# Patient Record
Sex: MALE | Race: WHITE | NOT HISPANIC OR LATINO | Employment: OTHER | URBAN - METROPOLITAN AREA
[De-identification: names, ages, dates, MRNs, and addresses within clinical notes are randomized per-mention and may not be internally consistent; named-entity substitution may affect disease eponyms.]

---

## 2017-01-25 ENCOUNTER — AMBULATORY - HEALTHEAST (OUTPATIENT)
Dept: NURSING | Facility: CLINIC | Age: 72
End: 2017-01-25

## 2017-03-07 ENCOUNTER — AMBULATORY - HEALTHEAST (OUTPATIENT)
Dept: NURSING | Facility: CLINIC | Age: 72
End: 2017-03-07

## 2017-04-07 ENCOUNTER — AMBULATORY - HEALTHEAST (OUTPATIENT)
Dept: NURSING | Facility: CLINIC | Age: 72
End: 2017-04-07

## 2017-05-08 ENCOUNTER — AMBULATORY - HEALTHEAST (OUTPATIENT)
Dept: NURSING | Facility: CLINIC | Age: 72
End: 2017-05-08

## 2017-05-31 ENCOUNTER — RECORDS - HEALTHEAST (OUTPATIENT)
Dept: ADMINISTRATIVE | Facility: OTHER | Age: 72
End: 2017-05-31

## 2017-06-01 ENCOUNTER — RECORDS - HEALTHEAST (OUTPATIENT)
Dept: ADMINISTRATIVE | Facility: OTHER | Age: 72
End: 2017-06-01

## 2017-06-07 ENCOUNTER — RECORDS - HEALTHEAST (OUTPATIENT)
Dept: ADMINISTRATIVE | Facility: OTHER | Age: 72
End: 2017-06-07

## 2017-06-08 ENCOUNTER — AMBULATORY - HEALTHEAST (OUTPATIENT)
Dept: NURSING | Facility: CLINIC | Age: 72
End: 2017-06-08

## 2017-06-15 ENCOUNTER — RECORDS - HEALTHEAST (OUTPATIENT)
Dept: ADMINISTRATIVE | Facility: OTHER | Age: 72
End: 2017-06-15

## 2017-07-05 ENCOUNTER — COMMUNICATION - HEALTHEAST (OUTPATIENT)
Dept: INTERNAL MEDICINE | Facility: CLINIC | Age: 72
End: 2017-07-05

## 2017-07-05 DIAGNOSIS — I10 ESSENTIAL HYPERTENSION: ICD-10-CM

## 2017-07-10 ENCOUNTER — AMBULATORY - HEALTHEAST (OUTPATIENT)
Dept: NURSING | Facility: CLINIC | Age: 72
End: 2017-07-10

## 2017-07-19 ENCOUNTER — RECORDS - HEALTHEAST (OUTPATIENT)
Dept: ADMINISTRATIVE | Facility: OTHER | Age: 72
End: 2017-07-19

## 2017-07-27 ENCOUNTER — RECORDS - HEALTHEAST (OUTPATIENT)
Dept: ADMINISTRATIVE | Facility: OTHER | Age: 72
End: 2017-07-27

## 2017-08-10 ENCOUNTER — AMBULATORY - HEALTHEAST (OUTPATIENT)
Dept: NURSING | Facility: CLINIC | Age: 72
End: 2017-08-10

## 2017-08-30 ENCOUNTER — RECORDS - HEALTHEAST (OUTPATIENT)
Dept: ADMINISTRATIVE | Facility: OTHER | Age: 72
End: 2017-08-30

## 2017-09-07 ENCOUNTER — RECORDS - HEALTHEAST (OUTPATIENT)
Dept: ADMINISTRATIVE | Facility: OTHER | Age: 72
End: 2017-09-07

## 2017-09-12 ENCOUNTER — AMBULATORY - HEALTHEAST (OUTPATIENT)
Dept: NURSING | Facility: CLINIC | Age: 72
End: 2017-09-12

## 2017-10-02 ENCOUNTER — COMMUNICATION - HEALTHEAST (OUTPATIENT)
Dept: INTERNAL MEDICINE | Facility: CLINIC | Age: 72
End: 2017-10-02

## 2017-10-05 ENCOUNTER — OFFICE VISIT - HEALTHEAST (OUTPATIENT)
Dept: INTERNAL MEDICINE | Facility: CLINIC | Age: 72
End: 2017-10-05

## 2017-10-05 DIAGNOSIS — M25.512 LEFT SHOULDER PAIN: ICD-10-CM

## 2017-10-05 ASSESSMENT — MIFFLIN-ST. JEOR: SCORE: 1568.51

## 2017-10-10 ENCOUNTER — RECORDS - HEALTHEAST (OUTPATIENT)
Dept: ADMINISTRATIVE | Facility: OTHER | Age: 72
End: 2017-10-10

## 2017-10-11 ENCOUNTER — COMMUNICATION - HEALTHEAST (OUTPATIENT)
Dept: INTERNAL MEDICINE | Facility: CLINIC | Age: 72
End: 2017-10-11

## 2017-10-11 DIAGNOSIS — I10 ESSENTIAL HYPERTENSION: ICD-10-CM

## 2017-10-11 DIAGNOSIS — G60.8 PERIPHERAL SENSORY NEUROPATHY: ICD-10-CM

## 2017-10-12 ENCOUNTER — AMBULATORY - HEALTHEAST (OUTPATIENT)
Dept: NURSING | Facility: CLINIC | Age: 72
End: 2017-10-12

## 2017-11-13 ENCOUNTER — AMBULATORY - HEALTHEAST (OUTPATIENT)
Dept: NURSING | Facility: CLINIC | Age: 72
End: 2017-11-13

## 2017-11-17 ENCOUNTER — COMMUNICATION - HEALTHEAST (OUTPATIENT)
Dept: TELEHEALTH | Facility: CLINIC | Age: 72
End: 2017-11-17

## 2017-11-30 ENCOUNTER — RECORDS - HEALTHEAST (OUTPATIENT)
Dept: ADMINISTRATIVE | Facility: OTHER | Age: 72
End: 2017-11-30

## 2017-12-12 ENCOUNTER — AMBULATORY - HEALTHEAST (OUTPATIENT)
Dept: NURSING | Facility: CLINIC | Age: 72
End: 2017-12-12

## 2017-12-12 ENCOUNTER — AMBULATORY - HEALTHEAST (OUTPATIENT)
Dept: INTERNAL MEDICINE | Facility: CLINIC | Age: 72
End: 2017-12-12

## 2017-12-12 ENCOUNTER — COMMUNICATION - HEALTHEAST (OUTPATIENT)
Dept: INTERNAL MEDICINE | Facility: CLINIC | Age: 72
End: 2017-12-12

## 2017-12-21 ENCOUNTER — RECORDS - HEALTHEAST (OUTPATIENT)
Dept: ADMINISTRATIVE | Facility: OTHER | Age: 72
End: 2017-12-21

## 2018-01-17 ENCOUNTER — RECORDS - HEALTHEAST (OUTPATIENT)
Dept: ADMINISTRATIVE | Facility: OTHER | Age: 73
End: 2018-01-17

## 2018-01-25 ENCOUNTER — AMBULATORY - HEALTHEAST (OUTPATIENT)
Dept: NURSING | Facility: CLINIC | Age: 73
End: 2018-01-25

## 2018-01-25 DIAGNOSIS — E53.8 B12 DEFICIENCY: ICD-10-CM

## 2018-03-13 ENCOUNTER — AMBULATORY - HEALTHEAST (OUTPATIENT)
Dept: NURSING | Facility: CLINIC | Age: 73
End: 2018-03-13

## 2018-03-14 ENCOUNTER — RECORDS - HEALTHEAST (OUTPATIENT)
Dept: ADMINISTRATIVE | Facility: OTHER | Age: 73
End: 2018-03-14

## 2018-04-19 ENCOUNTER — AMBULATORY - HEALTHEAST (OUTPATIENT)
Dept: NURSING | Facility: CLINIC | Age: 73
End: 2018-04-19

## 2018-04-25 ENCOUNTER — OFFICE VISIT - HEALTHEAST (OUTPATIENT)
Dept: INTERNAL MEDICINE | Facility: CLINIC | Age: 73
End: 2018-04-25

## 2018-04-25 DIAGNOSIS — R73.9 HYPERGLYCEMIA: ICD-10-CM

## 2018-04-25 DIAGNOSIS — G60.8 PERIPHERAL SENSORY NEUROPATHY: ICD-10-CM

## 2018-04-25 DIAGNOSIS — Z00.00 PREVENTATIVE HEALTH CARE: ICD-10-CM

## 2018-04-25 DIAGNOSIS — K21.00 GASTROESOPHAGEAL REFLUX DISEASE WITH ESOPHAGITIS: ICD-10-CM

## 2018-04-25 DIAGNOSIS — N13.8 BPH WITH OBSTRUCTION/LOWER URINARY TRACT SYMPTOMS: ICD-10-CM

## 2018-04-25 DIAGNOSIS — R53.83 FATIGUE: ICD-10-CM

## 2018-04-25 DIAGNOSIS — R73.09 ELEVATED HEMOGLOBIN A1C: ICD-10-CM

## 2018-04-25 DIAGNOSIS — I10 ESSENTIAL HYPERTENSION: ICD-10-CM

## 2018-04-25 DIAGNOSIS — N40.1 BPH WITH OBSTRUCTION/LOWER URINARY TRACT SYMPTOMS: ICD-10-CM

## 2018-04-25 DIAGNOSIS — K22.70 BARRETT ESOPHAGUS: ICD-10-CM

## 2018-04-25 LAB
ALBUMIN SERPL-MCNC: 4 G/DL (ref 3.5–5)
ALBUMIN SERPL-MCNC: 4 G/DL (ref 3.5–5)
ALBUMIN UR-MCNC: ABNORMAL MG/DL
ALP SERPL-CCNC: 83 U/L (ref 45–120)
ALT SERPL W P-5'-P-CCNC: 112 U/L (ref 0–45)
ANION GAP SERPL CALCULATED.3IONS-SCNC: 11 MMOL/L (ref 5–18)
APPEARANCE UR: CLEAR
AST SERPL W P-5'-P-CCNC: 64 U/L (ref 0–40)
BACTERIA #/AREA URNS HPF: ABNORMAL HPF
BASOPHILS # BLD AUTO: 0 THOU/UL (ref 0–0.2)
BASOPHILS NFR BLD AUTO: 0 % (ref 0–2)
BILIRUB DIRECT SERPL-MCNC: 0.2 MG/DL
BILIRUB SERPL-MCNC: 0.6 MG/DL (ref 0–1)
BILIRUB UR QL STRIP: ABNORMAL
BUN SERPL-MCNC: 26 MG/DL (ref 8–28)
CALCIUM SERPL-MCNC: 9.9 MG/DL (ref 8.5–10.5)
CHLORIDE BLD-SCNC: 101 MMOL/L (ref 98–107)
CO2 SERPL-SCNC: 25 MMOL/L (ref 22–31)
COLOR UR AUTO: YELLOW
CREAT SERPL-MCNC: 1.5 MG/DL (ref 0.7–1.3)
EOSINOPHIL # BLD AUTO: 0.1 THOU/UL (ref 0–0.4)
EOSINOPHIL NFR BLD AUTO: 2 % (ref 0–6)
ERYTHROCYTE [DISTWIDTH] IN BLOOD BY AUTOMATED COUNT: 12.5 % (ref 11–14.5)
GFR SERPL CREATININE-BSD FRML MDRD: 46 ML/MIN/1.73M2
GLUCOSE BLD-MCNC: 79 MG/DL (ref 70–125)
GLUCOSE UR STRIP-MCNC: NEGATIVE MG/DL
HBA1C MFR BLD: 6 % (ref 3.5–6)
HCT VFR BLD AUTO: 40.6 % (ref 40–54)
HGB BLD-MCNC: 13.7 G/DL (ref 14–18)
HGB UR QL STRIP: NEGATIVE
HYALINE CASTS #/AREA URNS LPF: ABNORMAL LPF
KETONES UR STRIP-MCNC: ABNORMAL MG/DL
LEUKOCYTE ESTERASE UR QL STRIP: NEGATIVE
LYMPHOCYTES # BLD AUTO: 1.4 THOU/UL (ref 0.8–4.4)
LYMPHOCYTES NFR BLD AUTO: 19 % (ref 20–40)
MCH RBC QN AUTO: 32.1 PG (ref 27–34)
MCHC RBC AUTO-ENTMCNC: 33.8 G/DL (ref 32–36)
MCV RBC AUTO: 95 FL (ref 80–100)
MONOCYTES # BLD AUTO: 0.7 THOU/UL (ref 0–0.9)
MONOCYTES NFR BLD AUTO: 10 % (ref 2–10)
MUCOUS THREADS #/AREA URNS LPF: ABNORMAL LPF
NEUTROPHILS # BLD AUTO: 4.9 THOU/UL (ref 2–7.7)
NEUTROPHILS NFR BLD AUTO: 68 % (ref 50–70)
NITRATE UR QL: NEGATIVE
PH UR STRIP: 5.5 [PH] (ref 5–8)
PHOSPHATE SERPL-MCNC: 2.4 MG/DL (ref 2.5–4.5)
PLATELET # BLD AUTO: 179 THOU/UL (ref 140–440)
PMV BLD AUTO: 8.5 FL (ref 7–10)
POTASSIUM BLD-SCNC: 4 MMOL/L (ref 3.5–5)
PROT SERPL-MCNC: 7.6 G/DL (ref 6–8)
RBC # BLD AUTO: 4.27 MILL/UL (ref 4.4–6.2)
RBC #/AREA URNS AUTO: ABNORMAL HPF
SODIUM SERPL-SCNC: 137 MMOL/L (ref 136–145)
SP GR UR STRIP: >=1.03 (ref 1–1.03)
SQUAMOUS #/AREA URNS AUTO: ABNORMAL LPF
UROBILINOGEN UR STRIP-ACNC: ABNORMAL
VIT B12 SERPL-MCNC: 950 PG/ML (ref 213–816)
WBC #/AREA URNS AUTO: ABNORMAL HPF
WBC: 7.2 THOU/UL (ref 4–11)

## 2018-04-25 ASSESSMENT — MIFFLIN-ST. JEOR: SCORE: 1577.4

## 2018-04-26 LAB — HCV AB SERPL QL IA: NEGATIVE

## 2018-04-30 ENCOUNTER — AMBULATORY - HEALTHEAST (OUTPATIENT)
Dept: INTERNAL MEDICINE | Facility: CLINIC | Age: 73
End: 2018-04-30

## 2018-04-30 DIAGNOSIS — R74.8 ABNORMAL LIVER ENZYMES: ICD-10-CM

## 2018-05-23 ENCOUNTER — RECORDS - HEALTHEAST (OUTPATIENT)
Dept: ADMINISTRATIVE | Facility: OTHER | Age: 73
End: 2018-05-23

## 2018-05-24 ENCOUNTER — AMBULATORY - HEALTHEAST (OUTPATIENT)
Dept: NURSING | Facility: CLINIC | Age: 73
End: 2018-05-24

## 2018-05-24 ENCOUNTER — AMBULATORY - HEALTHEAST (OUTPATIENT)
Dept: LAB | Facility: CLINIC | Age: 73
End: 2018-05-24

## 2018-05-24 DIAGNOSIS — R74.8 ABNORMAL LIVER ENZYMES: ICD-10-CM

## 2018-05-24 DIAGNOSIS — E53.8 VITAMIN B12 DEFICIENCY: ICD-10-CM

## 2018-05-24 LAB
ALBUMIN SERPL-MCNC: 4 G/DL (ref 3.5–5)
ALP SERPL-CCNC: 84 U/L (ref 45–120)
ALT SERPL W P-5'-P-CCNC: 61 U/L (ref 0–45)
AST SERPL W P-5'-P-CCNC: 34 U/L (ref 0–40)
BILIRUB DIRECT SERPL-MCNC: 0.2 MG/DL
BILIRUB SERPL-MCNC: 0.5 MG/DL (ref 0–1)
PROT SERPL-MCNC: 6.9 G/DL (ref 6–8)

## 2018-06-04 ENCOUNTER — COMMUNICATION - HEALTHEAST (OUTPATIENT)
Dept: INTERNAL MEDICINE | Facility: CLINIC | Age: 73
End: 2018-06-04

## 2018-06-20 ENCOUNTER — AMBULATORY - HEALTHEAST (OUTPATIENT)
Dept: INTERNAL MEDICINE | Facility: CLINIC | Age: 73
End: 2018-06-20

## 2018-06-21 ENCOUNTER — OFFICE VISIT - HEALTHEAST (OUTPATIENT)
Dept: INTERNAL MEDICINE | Facility: CLINIC | Age: 73
End: 2018-06-21

## 2018-06-21 DIAGNOSIS — G62.9 POLYNEUROPATHY: ICD-10-CM

## 2018-06-21 DIAGNOSIS — I10 ESSENTIAL HYPERTENSION: ICD-10-CM

## 2018-06-21 DIAGNOSIS — Z01.818 PREOPERATIVE EXAMINATION: ICD-10-CM

## 2018-06-21 DIAGNOSIS — H26.9 CATARACT: ICD-10-CM

## 2018-06-21 DIAGNOSIS — N13.8 BPH WITH OBSTRUCTION/LOWER URINARY TRACT SYMPTOMS: ICD-10-CM

## 2018-06-21 DIAGNOSIS — K21.00 GASTROESOPHAGEAL REFLUX DISEASE WITH ESOPHAGITIS: ICD-10-CM

## 2018-06-21 DIAGNOSIS — N40.1 BPH WITH OBSTRUCTION/LOWER URINARY TRACT SYMPTOMS: ICD-10-CM

## 2018-06-21 DIAGNOSIS — D51.0 PERNICIOUS ANEMIA: ICD-10-CM

## 2018-06-21 DIAGNOSIS — N18.30 CKD (CHRONIC KIDNEY DISEASE) STAGE 3, GFR 30-59 ML/MIN (H): ICD-10-CM

## 2018-06-21 LAB
CREAT SERPL-MCNC: 1.49 MG/DL (ref 0.7–1.3)
GFR SERPL CREATININE-BSD FRML MDRD: 46 ML/MIN/1.73M2
POTASSIUM BLD-SCNC: 4.3 MMOL/L (ref 3.5–5)

## 2018-06-21 ASSESSMENT — MIFFLIN-ST. JEOR: SCORE: 1577.22

## 2018-06-28 ENCOUNTER — AMBULATORY - HEALTHEAST (OUTPATIENT)
Dept: NURSING | Facility: CLINIC | Age: 73
End: 2018-06-28

## 2018-06-28 DIAGNOSIS — D51.0 PERNICIOUS ANEMIA: ICD-10-CM

## 2018-07-22 ENCOUNTER — COMMUNICATION - HEALTHEAST (OUTPATIENT)
Dept: INTERNAL MEDICINE | Facility: CLINIC | Age: 73
End: 2018-07-22

## 2018-07-22 DIAGNOSIS — G60.8 PERIPHERAL SENSORY NEUROPATHY: ICD-10-CM

## 2018-07-26 ENCOUNTER — OFFICE VISIT - HEALTHEAST (OUTPATIENT)
Dept: INTERNAL MEDICINE | Facility: CLINIC | Age: 73
End: 2018-07-26

## 2018-07-26 DIAGNOSIS — K21.00 GASTROESOPHAGEAL REFLUX DISEASE WITH ESOPHAGITIS: ICD-10-CM

## 2018-07-26 DIAGNOSIS — I10 ESSENTIAL HYPERTENSION: ICD-10-CM

## 2018-07-26 DIAGNOSIS — N13.8 BPH WITH OBSTRUCTION/LOWER URINARY TRACT SYMPTOMS: ICD-10-CM

## 2018-07-26 DIAGNOSIS — K59.09 CHRONIC CONSTIPATION: ICD-10-CM

## 2018-07-26 DIAGNOSIS — N40.1 BPH WITH OBSTRUCTION/LOWER URINARY TRACT SYMPTOMS: ICD-10-CM

## 2018-07-26 DIAGNOSIS — G62.9 POLYNEUROPATHY: ICD-10-CM

## 2018-07-26 LAB
ALBUMIN SERPL-MCNC: 4.1 G/DL (ref 3.5–5)
ANION GAP SERPL CALCULATED.3IONS-SCNC: 11 MMOL/L (ref 5–18)
BUN SERPL-MCNC: 24 MG/DL (ref 8–28)
CALCIUM SERPL-MCNC: 9.8 MG/DL (ref 8.5–10.5)
CHLORIDE BLD-SCNC: 106 MMOL/L (ref 98–107)
CO2 SERPL-SCNC: 24 MMOL/L (ref 22–31)
CREAT SERPL-MCNC: 1.63 MG/DL (ref 0.7–1.3)
GFR SERPL CREATININE-BSD FRML MDRD: 42 ML/MIN/1.73M2
GLUCOSE BLD-MCNC: 106 MG/DL (ref 70–125)
PHOSPHATE SERPL-MCNC: 2.1 MG/DL (ref 2.5–4.5)
POTASSIUM BLD-SCNC: 4.5 MMOL/L (ref 3.5–5)
SODIUM SERPL-SCNC: 141 MMOL/L (ref 136–145)

## 2018-07-26 ASSESSMENT — MIFFLIN-ST. JEOR: SCORE: 1573.04

## 2018-08-03 ENCOUNTER — RECORDS - HEALTHEAST (OUTPATIENT)
Dept: ADMINISTRATIVE | Facility: OTHER | Age: 73
End: 2018-08-03

## 2018-08-29 ENCOUNTER — COMMUNICATION - HEALTHEAST (OUTPATIENT)
Dept: INTERNAL MEDICINE | Facility: CLINIC | Age: 73
End: 2018-08-29

## 2018-08-29 DIAGNOSIS — I10 ESSENTIAL HYPERTENSION: ICD-10-CM

## 2018-08-30 ENCOUNTER — AMBULATORY - HEALTHEAST (OUTPATIENT)
Dept: NURSING | Facility: CLINIC | Age: 73
End: 2018-08-30

## 2018-08-30 DIAGNOSIS — E53.8 VITAMIN B12 DEFICIENCY (NON ANEMIC): ICD-10-CM

## 2018-09-27 ENCOUNTER — AMBULATORY - HEALTHEAST (OUTPATIENT)
Dept: NURSING | Facility: CLINIC | Age: 73
End: 2018-09-27

## 2018-09-27 DIAGNOSIS — E53.8 VITAMIN B12 DEFICIENCY (NON ANEMIC): ICD-10-CM

## 2018-10-01 ENCOUNTER — COMMUNICATION - HEALTHEAST (OUTPATIENT)
Dept: SCHEDULING | Facility: CLINIC | Age: 73
End: 2018-10-01

## 2018-10-03 ENCOUNTER — AMBULATORY - HEALTHEAST (OUTPATIENT)
Dept: INTERNAL MEDICINE | Facility: CLINIC | Age: 73
End: 2018-10-03

## 2018-10-03 DIAGNOSIS — R10.13 ABDOMINAL PAIN, EPIGASTRIC: ICD-10-CM

## 2018-10-15 ENCOUNTER — RECORDS - HEALTHEAST (OUTPATIENT)
Dept: GENERAL RADIOLOGY | Facility: CLINIC | Age: 73
End: 2018-10-15

## 2018-10-15 ENCOUNTER — OFFICE VISIT - HEALTHEAST (OUTPATIENT)
Dept: INTERNAL MEDICINE | Facility: CLINIC | Age: 73
End: 2018-10-15

## 2018-10-15 DIAGNOSIS — R74.8 ABNORMAL LIVER ENZYMES: ICD-10-CM

## 2018-10-15 DIAGNOSIS — N18.30 CKD (CHRONIC KIDNEY DISEASE) STAGE 3, GFR 30-59 ML/MIN (H): ICD-10-CM

## 2018-10-15 DIAGNOSIS — I10 ESSENTIAL HYPERTENSION: ICD-10-CM

## 2018-10-15 DIAGNOSIS — R10.9 RIGHT FLANK PAIN: ICD-10-CM

## 2018-10-15 DIAGNOSIS — K21.00 GASTROESOPHAGEAL REFLUX DISEASE WITH ESOPHAGITIS: ICD-10-CM

## 2018-10-15 DIAGNOSIS — M47.817 LUMBAR AND SACRAL ARTHRITIS: ICD-10-CM

## 2018-10-15 DIAGNOSIS — G62.9 POLYNEUROPATHY: ICD-10-CM

## 2018-10-15 DIAGNOSIS — R10.9 UNSPECIFIED ABDOMINAL PAIN: ICD-10-CM

## 2018-10-15 LAB
ALBUMIN SERPL-MCNC: 4 G/DL (ref 3.5–5)
ALBUMIN UR-MCNC: ABNORMAL MG/DL
ALP SERPL-CCNC: 89 U/L (ref 45–120)
ALT SERPL W P-5'-P-CCNC: 55 U/L (ref 0–45)
ANION GAP SERPL CALCULATED.3IONS-SCNC: 13 MMOL/L (ref 5–18)
APPEARANCE UR: CLEAR
AST SERPL W P-5'-P-CCNC: 30 U/L (ref 0–40)
BACTERIA #/AREA URNS HPF: ABNORMAL HPF
BASOPHILS # BLD AUTO: 0.1 THOU/UL (ref 0–0.2)
BASOPHILS NFR BLD AUTO: 1 % (ref 0–2)
BILIRUB DIRECT SERPL-MCNC: 0.2 MG/DL
BILIRUB SERPL-MCNC: 0.5 MG/DL (ref 0–1)
BILIRUB UR QL STRIP: NEGATIVE
BUN SERPL-MCNC: 22 MG/DL (ref 8–28)
CALCIUM SERPL-MCNC: 10.3 MG/DL (ref 8.5–10.5)
CHLORIDE BLD-SCNC: 101 MMOL/L (ref 98–107)
CO2 SERPL-SCNC: 25 MMOL/L (ref 22–31)
COLOR UR AUTO: YELLOW
CREAT SERPL-MCNC: 1.42 MG/DL (ref 0.7–1.3)
EOSINOPHIL # BLD AUTO: 0.3 THOU/UL (ref 0–0.4)
EOSINOPHIL NFR BLD AUTO: 3 % (ref 0–6)
ERYTHROCYTE [DISTWIDTH] IN BLOOD BY AUTOMATED COUNT: 13 % (ref 11–14.5)
ERYTHROCYTE [SEDIMENTATION RATE] IN BLOOD BY WESTERGREN METHOD: 13 MM/HR (ref 0–15)
GFR SERPL CREATININE-BSD FRML MDRD: 49 ML/MIN/1.73M2
GLUCOSE BLD-MCNC: 102 MG/DL (ref 70–125)
GLUCOSE UR STRIP-MCNC: NEGATIVE MG/DL
HCT VFR BLD AUTO: 41.3 % (ref 40–54)
HGB BLD-MCNC: 13.9 G/DL (ref 14–18)
HGB UR QL STRIP: NEGATIVE
KETONES UR STRIP-MCNC: NEGATIVE MG/DL
LEUKOCYTE ESTERASE UR QL STRIP: NEGATIVE
LYMPHOCYTES # BLD AUTO: 1.2 THOU/UL (ref 0.8–4.4)
LYMPHOCYTES NFR BLD AUTO: 13 % (ref 20–40)
MCH RBC QN AUTO: 31.8 PG (ref 27–34)
MCHC RBC AUTO-ENTMCNC: 33.7 G/DL (ref 32–36)
MCV RBC AUTO: 95 FL (ref 80–100)
MONOCYTES # BLD AUTO: 0.7 THOU/UL (ref 0–0.9)
MONOCYTES NFR BLD AUTO: 8 % (ref 2–10)
NEUTROPHILS # BLD AUTO: 6.6 THOU/UL (ref 2–7.7)
NEUTROPHILS NFR BLD AUTO: 75 % (ref 50–70)
NITRATE UR QL: NEGATIVE
PH UR STRIP: 6.5 [PH] (ref 5–8)
PLATELET # BLD AUTO: 238 THOU/UL (ref 140–440)
PMV BLD AUTO: 11.3 FL (ref 8.5–12.5)
POTASSIUM BLD-SCNC: 4.1 MMOL/L (ref 3.5–5)
PROT SERPL-MCNC: 7.4 G/DL (ref 6–8)
RBC # BLD AUTO: 4.37 MILL/UL (ref 4.4–6.2)
RBC #/AREA URNS AUTO: ABNORMAL HPF
SODIUM SERPL-SCNC: 139 MMOL/L (ref 136–145)
SP GR UR STRIP: 1.01 (ref 1–1.03)
SQUAMOUS #/AREA URNS AUTO: ABNORMAL LPF
UROBILINOGEN UR STRIP-ACNC: ABNORMAL
WBC #/AREA URNS AUTO: ABNORMAL HPF
WBC: 8.9 THOU/UL (ref 4–11)

## 2018-10-15 ASSESSMENT — MIFFLIN-ST. JEOR: SCORE: 1595.54

## 2018-10-17 LAB
ALBUMIN PERCENT: 62.3 % (ref 51–67)
ALBUMIN SERPL ELPH-MCNC: 4.4 G/DL (ref 3.2–4.7)
ALPHA 1 PERCENT: 2.5 % (ref 2–4)
ALPHA 2 PERCENT: 10.9 % (ref 5–13)
ALPHA1 GLOB SERPL ELPH-MCNC: 0.2 G/DL (ref 0.1–0.3)
ALPHA2 GLOB SERPL ELPH-MCNC: 0.8 G/DL (ref 0.4–0.9)
B-GLOBULIN SERPL ELPH-MCNC: 1 G/DL (ref 0.7–1.2)
BETA PERCENT: 14 % (ref 10–17)
GAMMA GLOB SERPL ELPH-MCNC: 0.7 G/DL (ref 0.6–1.4)
GAMMA GLOBULIN PERCENT: 10.3 % (ref 9–20)
PATH ICD:: NORMAL
PROT PATTERN SERPL ELPH-IMP: NORMAL
PROT SERPL-MCNC: 7 G/DL (ref 6–8)
REVIEWING PATHOLOGIST: NORMAL

## 2018-10-19 ENCOUNTER — HOSPITAL ENCOUNTER (OUTPATIENT)
Dept: CT IMAGING | Facility: HOSPITAL | Age: 73
Discharge: HOME | End: 2018-10-19
Attending: INTERNAL MEDICINE

## 2018-10-19 DIAGNOSIS — R10.9 RIGHT FLANK PAIN: ICD-10-CM

## 2018-10-24 ENCOUNTER — RECORDS - HEALTHEAST (OUTPATIENT)
Dept: ADMINISTRATIVE | Facility: OTHER | Age: 73
End: 2018-10-24

## 2018-10-25 ENCOUNTER — RECORDS - HEALTHEAST (OUTPATIENT)
Dept: ADMINISTRATIVE | Facility: OTHER | Age: 73
End: 2018-10-25

## 2018-10-25 ENCOUNTER — AMBULATORY - HEALTHEAST (OUTPATIENT)
Dept: NURSING | Facility: CLINIC | Age: 73
End: 2018-10-25

## 2018-10-31 ENCOUNTER — RECORDS - HEALTHEAST (OUTPATIENT)
Dept: ADMINISTRATIVE | Facility: OTHER | Age: 73
End: 2018-10-31

## 2018-11-16 ENCOUNTER — RECORDS - HEALTHEAST (OUTPATIENT)
Dept: ADMINISTRATIVE | Facility: OTHER | Age: 73
End: 2018-11-16

## 2018-11-29 ENCOUNTER — AMBULATORY - HEALTHEAST (OUTPATIENT)
Dept: NURSING | Facility: CLINIC | Age: 73
End: 2018-11-29

## 2018-12-06 ENCOUNTER — RECORDS - HEALTHEAST (OUTPATIENT)
Dept: ADMINISTRATIVE | Facility: OTHER | Age: 73
End: 2018-12-06

## 2018-12-11 ENCOUNTER — RECORDS - HEALTHEAST (OUTPATIENT)
Dept: LAB | Facility: HOSPITAL | Age: 73
End: 2018-12-11

## 2018-12-11 LAB
FASTING STATUS PATIENT QL REPORTED: YES
GLUCOSE BLD-MCNC: 105 MG/DL (ref 70–125)
TSH SERPL DL<=0.005 MIU/L-ACNC: 1.79 UIU/ML (ref 0.3–5)

## 2018-12-12 ENCOUNTER — AMBULATORY - HEALTHEAST (OUTPATIENT)
Dept: INTERNAL MEDICINE | Facility: CLINIC | Age: 73
End: 2018-12-12

## 2018-12-13 ENCOUNTER — RECORDS - HEALTHEAST (OUTPATIENT)
Dept: ADMINISTRATIVE | Facility: OTHER | Age: 73
End: 2018-12-13

## 2018-12-13 LAB — LYME TOTAL ANTIBODY - HISTORICAL: 0.05 INDEX VALUE

## 2018-12-14 ENCOUNTER — HOSPITAL ENCOUNTER (OUTPATIENT)
Dept: LAB | Age: 73
Setting detail: SPECIMEN
Discharge: HOME | End: 2018-12-14

## 2018-12-14 ENCOUNTER — OFFICE VISIT - HEALTHEAST (OUTPATIENT)
Dept: INTERNAL MEDICINE | Facility: CLINIC | Age: 73
End: 2018-12-14

## 2018-12-14 DIAGNOSIS — K86.9 PANCREATIC LESION: ICD-10-CM

## 2018-12-14 DIAGNOSIS — N18.30 CKD (CHRONIC KIDNEY DISEASE) STAGE 3, GFR 30-59 ML/MIN (H): ICD-10-CM

## 2018-12-14 DIAGNOSIS — G62.9 POLYNEUROPATHY: ICD-10-CM

## 2018-12-14 DIAGNOSIS — I10 ESSENTIAL HYPERTENSION: ICD-10-CM

## 2018-12-14 LAB
ATRIAL RATE - MUSE: 65 BPM
DIASTOLIC BLOOD PRESSURE - MUSE: NORMAL MMHG
INTERPRETATION ECG - MUSE: NORMAL
P AXIS - MUSE: NORMAL DEGREES
POTASSIUM BLD-SCNC: 3.5 MMOL/L (ref 3.5–5)
PR INTERVAL - MUSE: NORMAL MS
QRS DURATION - MUSE: 88 MS
QT - MUSE: 430 MS
QTC - MUSE: 447 MS
R AXIS - MUSE: -4 DEGREES
SYSTOLIC BLOOD PRESSURE - MUSE: NORMAL MMHG
T AXIS - MUSE: 20 DEGREES
VENTRICULAR RATE- MUSE: 65 BPM

## 2018-12-14 ASSESSMENT — MIFFLIN-ST. JEOR: SCORE: 1555.67

## 2018-12-18 ENCOUNTER — RECORDS - HEALTHEAST (OUTPATIENT)
Dept: ADMINISTRATIVE | Facility: OTHER | Age: 73
End: 2018-12-18

## 2018-12-20 ENCOUNTER — ANESTHESIA - HEALTHEAST (OUTPATIENT)
Dept: SURGERY | Facility: HOSPITAL | Age: 73
End: 2018-12-20

## 2018-12-21 ENCOUNTER — SURGERY - HEALTHEAST (OUTPATIENT)
Dept: SURGERY | Facility: HOSPITAL | Age: 73
End: 2018-12-21

## 2018-12-27 ENCOUNTER — RECORDS - HEALTHEAST (OUTPATIENT)
Dept: ADMINISTRATIVE | Facility: OTHER | Age: 73
End: 2018-12-27

## 2018-12-28 ENCOUNTER — AMBULATORY - HEALTHEAST (OUTPATIENT)
Dept: NURSING | Facility: CLINIC | Age: 73
End: 2018-12-28

## 2018-12-28 DIAGNOSIS — E53.8 VITAMIN B12 DEFICIENCY: ICD-10-CM

## 2019-01-14 ENCOUNTER — RECORDS - HEALTHEAST (OUTPATIENT)
Dept: ADMINISTRATIVE | Facility: OTHER | Age: 74
End: 2019-01-14

## 2019-01-17 ENCOUNTER — RECORDS - HEALTHEAST (OUTPATIENT)
Dept: ADMINISTRATIVE | Facility: OTHER | Age: 74
End: 2019-01-17

## 2019-01-22 ENCOUNTER — RECORDS - HEALTHEAST (OUTPATIENT)
Dept: ADMINISTRATIVE | Facility: OTHER | Age: 74
End: 2019-01-22

## 2019-01-24 ENCOUNTER — OFFICE VISIT - HEALTHEAST (OUTPATIENT)
Dept: INTERNAL MEDICINE | Facility: CLINIC | Age: 74
End: 2019-01-24

## 2019-01-24 DIAGNOSIS — I10 ESSENTIAL HYPERTENSION: ICD-10-CM

## 2019-01-24 DIAGNOSIS — G62.9 POLYNEUROPATHY: ICD-10-CM

## 2019-01-24 DIAGNOSIS — K22.70 BARRETT'S ESOPHAGUS WITHOUT DYSPLASIA: ICD-10-CM

## 2019-01-24 DIAGNOSIS — K86.2 PANCREATIC CYST: ICD-10-CM

## 2019-01-24 ASSESSMENT — MIFFLIN-ST. JEOR: SCORE: 1555.67

## 2019-03-07 ENCOUNTER — AMBULATORY - HEALTHEAST (OUTPATIENT)
Dept: NURSING | Facility: CLINIC | Age: 74
End: 2019-03-07

## 2019-03-07 DIAGNOSIS — E53.8 VITAMIN B 12 DEFICIENCY: ICD-10-CM

## 2019-04-02 ENCOUNTER — AMBULATORY - HEALTHEAST (OUTPATIENT)
Dept: NURSING | Facility: CLINIC | Age: 74
End: 2019-04-02

## 2019-04-02 DIAGNOSIS — E53.9 VITAMIN B DEFICIENCY: ICD-10-CM

## 2019-04-30 ENCOUNTER — AMBULATORY - HEALTHEAST (OUTPATIENT)
Dept: NURSING | Facility: CLINIC | Age: 74
End: 2019-04-30

## 2019-05-02 ENCOUNTER — COMMUNICATION - HEALTHEAST (OUTPATIENT)
Dept: INTERNAL MEDICINE | Facility: CLINIC | Age: 74
End: 2019-05-02

## 2019-05-07 ENCOUNTER — COMMUNICATION - HEALTHEAST (OUTPATIENT)
Dept: INTERNAL MEDICINE | Facility: CLINIC | Age: 74
End: 2019-05-07

## 2019-05-09 ENCOUNTER — RECORDS - HEALTHEAST (OUTPATIENT)
Dept: ADMINISTRATIVE | Facility: OTHER | Age: 74
End: 2019-05-09

## 2019-05-13 ENCOUNTER — RECORDS - HEALTHEAST (OUTPATIENT)
Dept: ADMINISTRATIVE | Facility: OTHER | Age: 74
End: 2019-05-13

## 2019-05-29 ENCOUNTER — RECORDS - HEALTHEAST (OUTPATIENT)
Dept: ADMINISTRATIVE | Facility: OTHER | Age: 74
End: 2019-05-29

## 2019-05-30 ENCOUNTER — AMBULATORY - HEALTHEAST (OUTPATIENT)
Dept: NURSING | Facility: CLINIC | Age: 74
End: 2019-05-30

## 2019-06-12 ENCOUNTER — RECORDS - HEALTHEAST (OUTPATIENT)
Dept: ADMINISTRATIVE | Facility: OTHER | Age: 74
End: 2019-06-12

## 2019-06-25 ENCOUNTER — AMBULATORY - HEALTHEAST (OUTPATIENT)
Dept: NURSING | Facility: CLINIC | Age: 74
End: 2019-06-25

## 2019-07-23 ENCOUNTER — RECORDS - HEALTHEAST (OUTPATIENT)
Dept: ADMINISTRATIVE | Facility: OTHER | Age: 74
End: 2019-07-23

## 2019-07-24 ENCOUNTER — OFFICE VISIT - HEALTHEAST (OUTPATIENT)
Dept: INTERNAL MEDICINE | Facility: CLINIC | Age: 74
End: 2019-07-24

## 2019-07-24 DIAGNOSIS — K22.70 BARRETT'S ESOPHAGUS WITHOUT DYSPLASIA: ICD-10-CM

## 2019-07-24 DIAGNOSIS — K31.9 GASTROPATHY: ICD-10-CM

## 2019-07-24 DIAGNOSIS — N18.30 CKD (CHRONIC KIDNEY DISEASE) STAGE 3, GFR 30-59 ML/MIN (H): ICD-10-CM

## 2019-07-24 DIAGNOSIS — R10.13 DYSPEPSIA: ICD-10-CM

## 2019-07-24 DIAGNOSIS — G62.9 POLYNEUROPATHY: ICD-10-CM

## 2019-07-24 DIAGNOSIS — I10 ESSENTIAL HYPERTENSION: ICD-10-CM

## 2019-07-24 DIAGNOSIS — N13.8 BPH WITH OBSTRUCTION/LOWER URINARY TRACT SYMPTOMS: ICD-10-CM

## 2019-07-24 DIAGNOSIS — N40.1 BPH WITH OBSTRUCTION/LOWER URINARY TRACT SYMPTOMS: ICD-10-CM

## 2019-07-24 LAB
ANION GAP SERPL CALCULATED.3IONS-SCNC: 10 MMOL/L (ref 5–18)
BASOPHILS # BLD AUTO: 0 THOU/UL (ref 0–0.2)
BASOPHILS NFR BLD AUTO: 1 % (ref 0–2)
BUN SERPL-MCNC: 23 MG/DL (ref 8–28)
CALCIUM SERPL-MCNC: 10.1 MG/DL (ref 8.5–10.5)
CHLORIDE BLD-SCNC: 102 MMOL/L (ref 98–107)
CO2 SERPL-SCNC: 26 MMOL/L (ref 22–31)
CREAT SERPL-MCNC: 1.33 MG/DL (ref 0.7–1.3)
EOSINOPHIL # BLD AUTO: 0.3 THOU/UL (ref 0–0.4)
EOSINOPHIL NFR BLD AUTO: 4 % (ref 0–6)
ERYTHROCYTE [DISTWIDTH] IN BLOOD BY AUTOMATED COUNT: 13.1 % (ref 11–14.5)
GFR SERPL CREATININE-BSD FRML MDRD: 52 ML/MIN/1.73M2
GLUCOSE BLD-MCNC: 86 MG/DL (ref 70–125)
HCT VFR BLD AUTO: 40.7 % (ref 40–54)
HGB BLD-MCNC: 13.9 G/DL (ref 14–18)
LYMPHOCYTES # BLD AUTO: 1.8 THOU/UL (ref 0.8–4.4)
LYMPHOCYTES NFR BLD AUTO: 20 % (ref 20–40)
MCH RBC QN AUTO: 31.8 PG (ref 27–34)
MCHC RBC AUTO-ENTMCNC: 34.1 G/DL (ref 32–36)
MCV RBC AUTO: 93 FL (ref 80–100)
MONOCYTES # BLD AUTO: 0.8 THOU/UL (ref 0–0.9)
MONOCYTES NFR BLD AUTO: 10 % (ref 2–10)
NEUTROPHILS # BLD AUTO: 5.6 THOU/UL (ref 2–7.7)
NEUTROPHILS NFR BLD AUTO: 65 % (ref 50–70)
PLATELET # BLD AUTO: 203 THOU/UL (ref 140–440)
PMV BLD AUTO: 8.3 FL (ref 7–10)
POTASSIUM BLD-SCNC: 4.2 MMOL/L (ref 3.5–5)
RBC # BLD AUTO: 4.36 MILL/UL (ref 4.4–6.2)
SODIUM SERPL-SCNC: 138 MMOL/L (ref 136–145)
WBC: 8.6 THOU/UL (ref 4–11)

## 2019-07-24 ASSESSMENT — MIFFLIN-ST. JEOR: SCORE: 1569.82

## 2019-08-08 ENCOUNTER — RECORDS - HEALTHEAST (OUTPATIENT)
Dept: ADMINISTRATIVE | Facility: OTHER | Age: 74
End: 2019-08-08

## 2019-08-13 ENCOUNTER — RECORDS - HEALTHEAST (OUTPATIENT)
Dept: ADMINISTRATIVE | Facility: OTHER | Age: 74
End: 2019-08-13

## 2019-08-22 ENCOUNTER — AMBULATORY - HEALTHEAST (OUTPATIENT)
Dept: NURSING | Facility: CLINIC | Age: 74
End: 2019-08-22

## 2019-09-19 ENCOUNTER — AMBULATORY - HEALTHEAST (OUTPATIENT)
Dept: NURSING | Facility: CLINIC | Age: 74
End: 2019-09-19

## 2019-09-25 ENCOUNTER — RECORDS - HEALTHEAST (OUTPATIENT)
Dept: ADMINISTRATIVE | Facility: OTHER | Age: 74
End: 2019-09-25

## 2019-10-03 ENCOUNTER — COMMUNICATION - HEALTHEAST (OUTPATIENT)
Dept: INTERNAL MEDICINE | Facility: CLINIC | Age: 74
End: 2019-10-03

## 2019-10-03 DIAGNOSIS — I10 ESSENTIAL HYPERTENSION: ICD-10-CM

## 2019-10-07 ENCOUNTER — COMMUNICATION - HEALTHEAST (OUTPATIENT)
Dept: INTERNAL MEDICINE | Facility: CLINIC | Age: 74
End: 2019-10-07

## 2019-10-08 ENCOUNTER — AMBULATORY - HEALTHEAST (OUTPATIENT)
Dept: INTERNAL MEDICINE | Facility: CLINIC | Age: 74
End: 2019-10-08

## 2019-10-08 DIAGNOSIS — I10 ESSENTIAL HYPERTENSION: ICD-10-CM

## 2019-10-15 ENCOUNTER — RECORDS - HEALTHEAST (OUTPATIENT)
Dept: ADMINISTRATIVE | Facility: OTHER | Age: 74
End: 2019-10-15

## 2019-10-17 ENCOUNTER — AMBULATORY - HEALTHEAST (OUTPATIENT)
Dept: NURSING | Facility: CLINIC | Age: 74
End: 2019-10-17

## 2019-10-18 ENCOUNTER — OFFICE VISIT - HEALTHEAST (OUTPATIENT)
Dept: INTERNAL MEDICINE | Facility: CLINIC | Age: 74
End: 2019-10-18

## 2019-10-18 DIAGNOSIS — I10 ESSENTIAL HYPERTENSION: ICD-10-CM

## 2019-10-18 DIAGNOSIS — K21.00 GASTROESOPHAGEAL REFLUX DISEASE WITH ESOPHAGITIS: ICD-10-CM

## 2019-10-18 DIAGNOSIS — K86.2 PANCREATIC CYST: ICD-10-CM

## 2019-10-18 DIAGNOSIS — G62.9 NEUROPATHY: ICD-10-CM

## 2019-10-18 DIAGNOSIS — K22.70 BARRETT'S ESOPHAGUS WITHOUT DYSPLASIA: ICD-10-CM

## 2019-10-18 DIAGNOSIS — K30 FUNCTIONAL DYSPEPSIA: ICD-10-CM

## 2019-10-18 DIAGNOSIS — F45.8 OTHER SOMATOFORM DISORDERS: ICD-10-CM

## 2019-10-18 LAB
ALBUMIN SERPL-MCNC: 4.3 G/DL (ref 3.5–5)
ANION GAP SERPL CALCULATED.3IONS-SCNC: 11 MMOL/L (ref 5–18)
ATRIAL RATE - MUSE: 63 BPM
BASOPHILS # BLD AUTO: 0.1 THOU/UL (ref 0–0.2)
BASOPHILS NFR BLD AUTO: 1 % (ref 0–2)
BUN SERPL-MCNC: 16 MG/DL (ref 8–28)
CALCIUM SERPL-MCNC: 9.8 MG/DL (ref 8.5–10.5)
CHLORIDE BLD-SCNC: 96 MMOL/L (ref 98–107)
CO2 SERPL-SCNC: 25 MMOL/L (ref 22–31)
CREAT SERPL-MCNC: 1.36 MG/DL (ref 0.7–1.3)
DIASTOLIC BLOOD PRESSURE - MUSE: NORMAL
EOSINOPHIL # BLD AUTO: 0.2 THOU/UL (ref 0–0.4)
EOSINOPHIL NFR BLD AUTO: 2 % (ref 0–6)
ERYTHROCYTE [DISTWIDTH] IN BLOOD BY AUTOMATED COUNT: 12.4 % (ref 11–14.5)
FERRITIN SERPL-MCNC: 72 NG/ML (ref 27–300)
GFR SERPL CREATININE-BSD FRML MDRD: 51 ML/MIN/1.73M2
GLUCOSE BLD-MCNC: 94 MG/DL (ref 70–125)
HCT VFR BLD AUTO: 39.1 % (ref 40–54)
HGB BLD-MCNC: 13.2 G/DL (ref 14–18)
INTERPRETATION ECG - MUSE: NORMAL
IRON SATN MFR SERPL: 30 % (ref 20–50)
IRON SERPL-MCNC: 94 UG/DL (ref 42–175)
LYMPHOCYTES # BLD AUTO: 1.9 THOU/UL (ref 0.8–4.4)
LYMPHOCYTES NFR BLD AUTO: 22 % (ref 20–40)
MCH RBC QN AUTO: 31.7 PG (ref 27–34)
MCHC RBC AUTO-ENTMCNC: 33.9 G/DL (ref 32–36)
MCV RBC AUTO: 94 FL (ref 80–100)
MONOCYTES # BLD AUTO: 0.8 THOU/UL (ref 0–0.9)
MONOCYTES NFR BLD AUTO: 9 % (ref 2–10)
NEUTROPHILS # BLD AUTO: 5.9 THOU/UL (ref 2–7.7)
NEUTROPHILS NFR BLD AUTO: 67 % (ref 50–70)
P AXIS - MUSE: 53 DEGREES
PHOSPHATE SERPL-MCNC: 2.1 MG/DL (ref 2.5–4.5)
PLATELET # BLD AUTO: 201 THOU/UL (ref 140–440)
PMV BLD AUTO: 8.5 FL (ref 7–10)
POTASSIUM BLD-SCNC: 4 MMOL/L (ref 3.5–5)
PR INTERVAL - MUSE: 210 MS
QRS DURATION - MUSE: 88 MS
QT - MUSE: 424 MS
QTC - MUSE: 433 MS
R AXIS - MUSE: -10 DEGREES
RBC # BLD AUTO: 4.17 MILL/UL (ref 4.4–6.2)
SODIUM SERPL-SCNC: 132 MMOL/L (ref 136–145)
SYSTOLIC BLOOD PRESSURE - MUSE: NORMAL
T AXIS - MUSE: 30 DEGREES
TIBC SERPL-MCNC: 318 UG/DL (ref 313–563)
TRANSFERRIN SERPL-MCNC: 255 MG/DL (ref 212–360)
VENTRICULAR RATE- MUSE: 63 BPM
WBC: 8.8 THOU/UL (ref 4–11)

## 2019-10-18 ASSESSMENT — MIFFLIN-ST. JEOR: SCORE: 1546.6

## 2019-10-21 ENCOUNTER — COMMUNICATION - HEALTHEAST (OUTPATIENT)
Dept: INTERNAL MEDICINE | Facility: CLINIC | Age: 74
End: 2019-10-21

## 2019-10-22 ENCOUNTER — COMMUNICATION - HEALTHEAST (OUTPATIENT)
Dept: INTERNAL MEDICINE | Facility: CLINIC | Age: 74
End: 2019-10-22

## 2019-10-22 DIAGNOSIS — G62.9 NEUROPATHY: ICD-10-CM

## 2019-10-23 ENCOUNTER — COMMUNICATION - HEALTHEAST (OUTPATIENT)
Dept: INTERNAL MEDICINE | Facility: CLINIC | Age: 74
End: 2019-10-23

## 2019-10-29 ENCOUNTER — RECORDS - HEALTHEAST (OUTPATIENT)
Dept: ADMINISTRATIVE | Facility: OTHER | Age: 74
End: 2019-10-29

## 2019-11-14 ENCOUNTER — AMBULATORY - HEALTHEAST (OUTPATIENT)
Dept: NURSING | Facility: CLINIC | Age: 74
End: 2019-11-14

## 2019-11-14 ENCOUNTER — ANESTHESIA - HEALTHEAST (OUTPATIENT)
Dept: SURGERY | Facility: HOSPITAL | Age: 74
End: 2019-11-14

## 2019-11-15 ENCOUNTER — SURGERY - HEALTHEAST (OUTPATIENT)
Dept: SURGERY | Facility: HOSPITAL | Age: 74
End: 2019-11-15

## 2019-12-09 ENCOUNTER — RECORDS - HEALTHEAST (OUTPATIENT)
Dept: ADMINISTRATIVE | Facility: OTHER | Age: 74
End: 2019-12-09

## 2019-12-19 ENCOUNTER — AMBULATORY - HEALTHEAST (OUTPATIENT)
Dept: NURSING | Facility: CLINIC | Age: 74
End: 2019-12-19

## 2019-12-26 ENCOUNTER — COMMUNICATION - HEALTHEAST (OUTPATIENT)
Dept: INTERNAL MEDICINE | Facility: CLINIC | Age: 74
End: 2019-12-26

## 2019-12-26 DIAGNOSIS — M47.817 LUMBAR AND SACRAL ARTHRITIS: ICD-10-CM

## 2019-12-26 DIAGNOSIS — G62.9 NEUROPATHY: ICD-10-CM

## 2020-01-08 ENCOUNTER — RECORDS - HEALTHEAST (OUTPATIENT)
Dept: ADMINISTRATIVE | Facility: OTHER | Age: 75
End: 2020-01-08

## 2020-01-16 ENCOUNTER — RECORDS - HEALTHEAST (OUTPATIENT)
Dept: ADMINISTRATIVE | Facility: OTHER | Age: 75
End: 2020-01-16

## 2020-01-21 ENCOUNTER — AMBULATORY - HEALTHEAST (OUTPATIENT)
Dept: NURSING | Facility: CLINIC | Age: 75
End: 2020-01-21

## 2020-03-10 ENCOUNTER — AMBULATORY - HEALTHEAST (OUTPATIENT)
Dept: NURSING | Facility: CLINIC | Age: 75
End: 2020-03-10

## 2020-03-25 ENCOUNTER — COMMUNICATION - HEALTHEAST (OUTPATIENT)
Dept: SCHEDULING | Facility: CLINIC | Age: 75
End: 2020-03-25

## 2020-05-19 ENCOUNTER — RECORDS - HEALTHEAST (OUTPATIENT)
Dept: ADMINISTRATIVE | Facility: OTHER | Age: 75
End: 2020-05-19

## 2020-05-21 ENCOUNTER — RECORDS - HEALTHEAST (OUTPATIENT)
Dept: ADMINISTRATIVE | Facility: OTHER | Age: 75
End: 2020-05-21

## 2020-06-02 ENCOUNTER — AMBULATORY - HEALTHEAST (OUTPATIENT)
Dept: NURSING | Facility: CLINIC | Age: 75
End: 2020-06-02

## 2020-06-04 ENCOUNTER — RECORDS - HEALTHEAST (OUTPATIENT)
Dept: ADMINISTRATIVE | Facility: OTHER | Age: 75
End: 2020-06-04
Payer: MEDICARE

## 2020-06-11 DIAGNOSIS — G62.9 NEUROPATHY: Primary | ICD-10-CM

## 2020-06-12 RX ORDER — GABAPENTIN 100 MG/1
CAPSULE ORAL
COMMUNITY
Start: 2020-03-30 | End: 2020-06-12

## 2020-06-12 RX ORDER — GABAPENTIN 100 MG/1
CAPSULE ORAL
Qty: 630 CAPSULE | Refills: 0 | Status: SHIPPED | OUTPATIENT
Start: 2020-06-12 | End: 2020-08-19

## 2020-06-16 ENCOUNTER — RECORDS - HEALTHEAST (OUTPATIENT)
Dept: ADMINISTRATIVE | Facility: OTHER | Age: 75
End: 2020-06-16

## 2020-06-25 ENCOUNTER — RECORDS - HEALTHEAST (OUTPATIENT)
Dept: ADMINISTRATIVE | Facility: OTHER | Age: 75
End: 2020-06-25

## 2020-07-07 ENCOUNTER — AMBULATORY - HEALTHEAST (OUTPATIENT)
Dept: NURSING | Facility: CLINIC | Age: 75
End: 2020-07-07

## 2020-08-04 ENCOUNTER — AMBULATORY - HEALTHEAST (OUTPATIENT)
Dept: NURSING | Facility: CLINIC | Age: 75
End: 2020-08-04

## 2020-08-11 ENCOUNTER — RECORDS - HEALTHEAST (OUTPATIENT)
Dept: ADMINISTRATIVE | Facility: OTHER | Age: 75
End: 2020-08-11

## 2020-08-19 DIAGNOSIS — G62.9 NEUROPATHY: ICD-10-CM

## 2020-08-19 RX ORDER — GABAPENTIN 100 MG/1
CAPSULE ORAL
Qty: 630 CAPSULE | Refills: 0 | Status: SHIPPED | OUTPATIENT
Start: 2020-08-19 | End: 2021-03-23

## 2020-08-19 NOTE — TELEPHONE ENCOUNTER
Medication refill T'd ready for sig.    Patient is schedule for a follow up middle of October.     Juanita Lee on 8/19/2020 at 2:23 PM

## 2020-09-01 ENCOUNTER — AMBULATORY - HEALTHEAST (OUTPATIENT)
Dept: NURSING | Facility: CLINIC | Age: 75
End: 2020-09-01

## 2020-09-01 DIAGNOSIS — N18.30 CKD (CHRONIC KIDNEY DISEASE) STAGE 3, GFR 30-59 ML/MIN (H): ICD-10-CM

## 2020-09-04 ENCOUNTER — RECORDS - HEALTHEAST (OUTPATIENT)
Dept: ADMINISTRATIVE | Facility: OTHER | Age: 75
End: 2020-09-04

## 2020-09-30 ENCOUNTER — OFFICE VISIT (OUTPATIENT)
Dept: NEUROLOGY | Facility: CLINIC | Age: 75
End: 2020-09-30
Payer: MEDICARE

## 2020-09-30 ENCOUNTER — RECORDS - HEALTHEAST (OUTPATIENT)
Dept: ADMINISTRATIVE | Facility: OTHER | Age: 75
End: 2020-09-30

## 2020-09-30 VITALS
DIASTOLIC BLOOD PRESSURE: 74 MMHG | SYSTOLIC BLOOD PRESSURE: 124 MMHG | HEART RATE: 60 BPM | HEIGHT: 77 IN | BODY MASS INDEX: 23.02 KG/M2 | WEIGHT: 195 LBS

## 2020-09-30 DIAGNOSIS — M79.2 NEUROPATHIC PAIN: ICD-10-CM

## 2020-09-30 DIAGNOSIS — G25.81 RESTLESS LEG SYNDROME: ICD-10-CM

## 2020-09-30 DIAGNOSIS — G62.9 NEUROPATHY: Primary | ICD-10-CM

## 2020-09-30 PROCEDURE — 99214 OFFICE O/P EST MOD 30 MIN: CPT | Performed by: PSYCHIATRY & NEUROLOGY

## 2020-09-30 RX ORDER — ASPIRIN 81 MG/1
81 TABLET, CHEWABLE ORAL PRN
COMMUNITY
Start: 2018-12-04

## 2020-09-30 RX ORDER — TRAMADOL HYDROCHLORIDE 50 MG/1
50 TABLET ORAL EVERY 6 HOURS PRN
COMMUNITY
Start: 2018-12-04 | End: 2021-10-26

## 2020-09-30 RX ORDER — LOSARTAN POTASSIUM AND HYDROCHLOROTHIAZIDE 12.5; 5 MG/1; MG/1
TABLET ORAL
COMMUNITY
Start: 2018-08-29 | End: 2021-09-07

## 2020-09-30 RX ORDER — SODIUM PHOSPHATE,MONO-DIBASIC 19G-7G/118
ENEMA (ML) RECTAL
COMMUNITY
Start: 2018-12-04

## 2020-09-30 ASSESSMENT — MIFFLIN-ST. JEOR: SCORE: 1731.89

## 2020-09-30 NOTE — LETTER
9/30/2020         RE: Mickey Desouza  [address] [facility] [facility] [address]        Dear Colleague,    Thank you for referring your patient, Mickey Desouza, to the [facility] NEUROLOGY [place]. Please see a copy of my visit note below.    NEUROLOGY OUTPATIENT PROGRESS NOTE  Sep 30, 2020     CHIEF COMPLAINT/REASON FOR VISIT/REASON FOR CONSULT  Patient presents with:  Follow Up  NEUROPATHY    REASON FOR CONSULTATION- Neuropathy/Neuropathic pain    HISTORY OF PRESENT ILLNESS  Mickey Desouza is a 75 year old male seen for  numbness in the feet and neuropathic pain. EMG had shown a neuropathy. Blood work was negative for causes of neuropathy. He has pins-and-needles and pain for which he is using gabapentin which she does find benefit. He also reports some sensation to move his legs to get comfortable at nighttime. Ferritin came back normal. In the past we had tried Cymbalta which she only tried for little bit before his primary switched him back to the gabapentin. He has also been prescribed Lyrica in the past but could not afford it because of cost. Pain starts coming on in the evening time. Reports that sometimes the pain wakes him up in the nighttime.    He was supposed to be on gabapentin to 100 mg at 4  mg at 7:30  mg at 10:30  mg at 12:30 PM to prevent the pain from happening early in the evening and later in the morning. He is not taking the 4:00 dose. He reports that the numbness is about the same. Numbness is limited to below the ankles. Pain has been about the same maybe slightly better. Though he does have good days and bad days. Bad days about once a week. Overall things have been working well for him. Pain can be more sharp in the feet. Pain is unchanged in nature. Walking does make the pain worse for him. Denies any other neurological symptoms.    9/30/20  Patient returns today.  He reports that he continues to have numbness in his feet.  Has not progressed.  Continues  to have problems with neuropathic pain.  1 day he started having the pain earlier in the daytime.  Otherwise the pain mainly comes in the evening.  He is tried taking the gabapentin 4 times as previously discussed but that has not really helped.  Currently is taking 300 mg of gabapentin around 7 PM and then 300 mg at 9 PM.  This is the best combination is found.  Denies any side effects to the gabapentin.  Previously was having some neck pain which has improved.  He also has spinal stenosis and has been working with his doctor regarding spinal stenosis.  He further reports issues with knee pain and has been getting some injections for that.  Has been exercising 3 times a week.  This is for 45 minutes.    Previous history is reviewed and this is unchanged.    PAST MEDICAL/SURGICAL HISTORY  Past Medical History:   Diagnosis Date     Cancer (H)      There is no problem list on file for this patient.  Hx of carpal tunnel surgery bilaterally      FAMILY HISTORY  Family History   Problem Relation Age of Onset     Coronary Artery Disease Father    Heart attack: Father.      SOCIAL HISTORY  Social History     Tobacco Use     Smoking status: Never Smoker     Smokeless tobacco: Never Used   Substance Use Topics     Alcohol use: Not Currently     Drug use: Never       SYSTEMS REVIEW  Twelve-system ROS was done and other than the HPI this was negative.     MEDICATIONS  aspirin (ASA) 81 MG chewable tablet,   Instructions: Take 81 mg by mouth daily., Type: Maintenance  gabapentin (NEURONTIN) 100 MG capsule, See Instructions, Instructions: 100 mg at 4PM; 300 mg at 7:30 PM; 100 mg at 10:30 PM; 200 mg at 12:30 PM., # 630 cap(s), 0 Refill(s), Type: Maintenance, Pharmacy: [facility]/pharmacy #[id number], 100 mg at 4PM; 300 mg at 7:30 PM; 100 mg at 10:30 PM; 200 mg at 12:3...  glucosamine-chondroitin 500-400 MG CAPS per capsule,   Instructions: Take 1 capsule by mouth 2 (two) times a day., Type: Maintenance  losartan-hydrochlorothiazide (HYZAAR)  "50-12.5 MG tablet,   Instructions: Take 1 tablet by mouth daily., Type: Maintenance  Multiple Vitamins-Minerals (DAILY MULTIVITAMIN PO),   Instructions: Take 1 tablet by mouth daily., Type: Maintenance  omeprazole (PRILOSEC) 20 MG DR capsule,   traMADol (ULTRAM) 50 MG tablet,     No current facility-administered medications on file prior to visit.        PHYSICAL EXAMINATION  VITALS: /74   Pulse 60   Ht 1.956 m (6' 5\")   Wt 88.5 kg (195 lb)   BMI 23.12 kg/m    GENERAL: Healthy appearing, alert, no acute distress, normal habitus.   NEUROLOGICAL: Patient is awake and oriented to self, place and time. Attention span is normal. Memory is intact. Language is fluent and follows commands appropriately. Appropriate fund of knowledge. Cranial nerves 2-12 are intact. There is no pronator drift. Motor exam shows 5/5 strength in all extremities tested proximally. Tone is symmetric bilaterally in upper and lower extremities. Reflexes are symmetric and 2+ in upper extremities and lower extremities. Sensory exam shows decreased pin prick to mid shin level/above ankle level. Finger to nose and heel to shin is without dysmetria. Romberg is negative. Gait is normal. Patient is unsteady with walking tandem.    DIAGNOSTICS  EMG  CLINICAL INTERPRETATION:  This is a abnormal nerve conduction and EMG study of both lower extremities. The study is consistent with a peripheral neuropathy. Further clinical correlation is needed.     RELEVANT LABS  Ferritin 72  Glucose Level: 105 (12/11/18 07:25:00)   Glucose Fasting: Yes (12/11/18 07:25:00)   TSH Cascade: 1.79 (12/11/18 07:25:00)   Lyme Ab: 0.05 (12/11/18 07:25:00)   SPEP = normal  B12 = normal    IMPRESSION/REPORT/PLAN  Neuropathy  Neuropathic pain  Restless leg syndrome    This is a 75 year old male  idiopathic neuropathy, neuropathic pain, question of restless leg syndrome.  We will continue gabapentin 300 mg at 7 PM and 300 mg at 9 PM since this is the best dosing he is found.   " Ferritin was negative and is unclear if he actually has restless leg syndrome or not. Lyrica could be tried since it is generic. Could consider retrying the Cymbalta though he feels that the gabapentin does help him sleep. I will see him back in 3 months.  We will hold off on refills per patient request he will call back in 1 month to get more refills.    Over 25 minutes were spent coordinating the care for the patient today.  More than 50% of the time was spent counseling, educating the patient.    Kulwinder Adame MD  Neurologist  [facility] [place] [facility]  Tel:- [phone]    This note was dictated using voice recognition software.  Any grammatical or context distortions are unintentional and inherent to the software.      Again, thank you for allowing me to participate in the care of your patient.        Sincerely,        Kulwinder Adame MD

## 2020-09-30 NOTE — PROGRESS NOTES
NEUROLOGY OUTPATIENT PROGRESS NOTE  Sep 30, 2020     CHIEF COMPLAINT/REASON FOR VISIT/REASON FOR CONSULT  Patient presents with:  Follow Up  NEUROPATHY    REASON FOR CONSULTATION- Neuropathy/Neuropathic pain    HISTORY OF PRESENT ILLNESS  Mickey Desouza is a 75 year old male seen for  numbness in the feet and neuropathic pain. EMG had shown a neuropathy. Blood work was negative for causes of neuropathy. He has pins-and-needles and pain for which he is using gabapentin which she does find benefit. He also reports some sensation to move his legs to get comfortable at nighttime. Ferritin came back normal. In the past we had tried Cymbalta which she only tried for little bit before his primary switched him back to the gabapentin. He has also been prescribed Lyrica in the past but could not afford it because of cost. Pain starts coming on in the evening time. Reports that sometimes the pain wakes him up in the nighttime.    He was supposed to be on gabapentin to 100 mg at 4  mg at 7:30  mg at 10:30  mg at 12:30 PM to prevent the pain from happening early in the evening and later in the morning. He is not taking the 4:00 dose. He reports that the numbness is about the same. Numbness is limited to below the ankles. Pain has been about the same maybe slightly better. Though he does have good days and bad days. Bad days about once a week. Overall things have been working well for him. Pain can be more sharp in the feet. Pain is unchanged in nature. Walking does make the pain worse for him. Denies any other neurological symptoms.    9/30/20  Patient returns today.  He reports that he continues to have numbness in his feet.  Has not progressed.  Continues to have problems with neuropathic pain.  1 day he started having the pain earlier in the daytime.  Otherwise the pain mainly comes in the evening.  He is tried taking the gabapentin 4 times as previously discussed but that has not really helped.  Currently  is taking 300 mg of gabapentin around 7 PM and then 300 mg at 9 PM.  This is the best combination is found.  Denies any side effects to the gabapentin.  Previously was having some neck pain which has improved.  He also has spinal stenosis and has been working with his doctor regarding spinal stenosis.  He further reports issues with knee pain and has been getting some injections for that.  Has been exercising 3 times a week.  This is for 45 minutes.    Previous history is reviewed and this is unchanged.    PAST MEDICAL/SURGICAL HISTORY  Past Medical History:   Diagnosis Date     Cancer (H)      There is no problem list on file for this patient.  Hx of carpal tunnel surgery bilaterally      FAMILY HISTORY  Family History   Problem Relation Age of Onset     Coronary Artery Disease Father    Heart attack: Father.      SOCIAL HISTORY  Social History     Tobacco Use     Smoking status: Never Smoker     Smokeless tobacco: Never Used   Substance Use Topics     Alcohol use: Not Currently     Drug use: Never       SYSTEMS REVIEW  Twelve-system ROS was done and other than the HPI this was negative.     MEDICATIONS  aspirin (ASA) 81 MG chewable tablet,   Instructions: Take 81 mg by mouth daily., Type: Maintenance  gabapentin (NEURONTIN) 100 MG capsule, See Instructions, Instructions: 100 mg at 4PM; 300 mg at 7:30 PM; 100 mg at 10:30 PM; 200 mg at 12:30 PM., # 630 cap(s), 0 Refill(s), Type: Maintenance, Pharmacy: [facility]/pharmacy #[id number], 100 mg at 4PM; 300 mg at 7:30 PM; 100 mg at 10:30 PM; 200 mg at 12:3...  glucosamine-chondroitin 500-400 MG CAPS per capsule,   Instructions: Take 1 capsule by mouth 2 (two) times a day., Type: Maintenance  losartan-hydrochlorothiazide (HYZAAR) 50-12.5 MG tablet,   Instructions: Take 1 tablet by mouth daily., Type: Maintenance  Multiple Vitamins-Minerals (DAILY MULTIVITAMIN PO),   Instructions: Take 1 tablet by mouth daily., Type: Maintenance  omeprazole (PRILOSEC) 20 MG DR capsule,   traMADol  "(ULTRAM) 50 MG tablet,     No current facility-administered medications on file prior to visit.        PHYSICAL EXAMINATION  VITALS: /74   Pulse 60   Ht 1.956 m (6' 5\")   Wt 88.5 kg (195 lb)   BMI 23.12 kg/m    GENERAL: Healthy appearing, alert, no acute distress, normal habitus.   NEUROLOGICAL: Patient is awake and oriented to self, place and time. Attention span is normal. Memory is intact. Language is fluent and follows commands appropriately. Appropriate fund of knowledge. Cranial nerves 2-12 are intact. There is no pronator drift. Motor exam shows 5/5 strength in all extremities tested proximally. Tone is symmetric bilaterally in upper and lower extremities. Reflexes are symmetric and 2+ in upper extremities and lower extremities. Sensory exam shows decreased pin prick to mid shin level/above ankle level. Finger to nose and heel to shin is without dysmetria. Romberg is negative. Gait is normal. Patient is unsteady with walking tandem.    DIAGNOSTICS  EMG  CLINICAL INTERPRETATION:  This is a abnormal nerve conduction and EMG study of both lower extremities. The study is consistent with a peripheral neuropathy. Further clinical correlation is needed.     RELEVANT LABS  Ferritin 72  Glucose Level: 105 (12/11/18 07:25:00)   Glucose Fasting: Yes (12/11/18 07:25:00)   TSH Cascade: 1.79 (12/11/18 07:25:00)   Lyme Ab: 0.05 (12/11/18 07:25:00)   SPEP = normal  B12 = normal    IMPRESSION/REPORT/PLAN  Neuropathy  Neuropathic pain  Restless leg syndrome    This is a 75 year old male  idiopathic neuropathy, neuropathic pain, question of restless leg syndrome.  We will continue gabapentin 300 mg at 7 PM and 300 mg at 9 PM since this is the best dosing he is found.   Ferritin was negative and is unclear if he actually has restless leg syndrome or not. Lyrica could be tried since it is generic. Could consider retrying the Cymbalta though he feels that the gabapentin does help him sleep. I will see him back in 3 " months.  We will hold off on refills per patient request he will call back in 1 month to get more refills.    Over 25 minutes were spent coordinating the care for the patient today.  More than 50% of the time was spent counseling, educating the patient.    Kulwinder Adame MD  Neurologist  [facility]  Tel:- [phone]    This note was dictated using voice recognition software.  Any grammatical or context distortions are unintentional and inherent to the software.

## 2020-10-06 ENCOUNTER — AMBULATORY - HEALTHEAST (OUTPATIENT)
Dept: NURSING | Facility: CLINIC | Age: 75
End: 2020-10-06

## 2020-10-07 ENCOUNTER — RECORDS - HEALTHEAST (OUTPATIENT)
Dept: ADMINISTRATIVE | Facility: OTHER | Age: 75
End: 2020-10-07

## 2020-10-28 ENCOUNTER — RECORDS - HEALTHEAST (OUTPATIENT)
Dept: ADMINISTRATIVE | Facility: OTHER | Age: 75
End: 2020-10-28

## 2020-11-10 ENCOUNTER — AMBULATORY - HEALTHEAST (OUTPATIENT)
Dept: NURSING | Facility: CLINIC | Age: 75
End: 2020-11-10

## 2020-11-19 ENCOUNTER — RECORDS - HEALTHEAST (OUTPATIENT)
Dept: ADMINISTRATIVE | Facility: OTHER | Age: 75
End: 2020-11-19

## 2020-11-20 ENCOUNTER — OFFICE VISIT - HEALTHEAST (OUTPATIENT)
Dept: INTERNAL MEDICINE | Facility: CLINIC | Age: 75
End: 2020-11-20

## 2020-11-20 DIAGNOSIS — I10 ESSENTIAL HYPERTENSION: ICD-10-CM

## 2020-11-20 DIAGNOSIS — Z12.5 SCREENING FOR PROSTATE CANCER: ICD-10-CM

## 2020-11-20 DIAGNOSIS — N40.1 BPH WITH OBSTRUCTION/LOWER URINARY TRACT SYMPTOMS: ICD-10-CM

## 2020-11-20 DIAGNOSIS — Z13.220 SCREENING FOR HYPERLIPIDEMIA: ICD-10-CM

## 2020-11-20 DIAGNOSIS — N13.8 BPH WITH OBSTRUCTION/LOWER URINARY TRACT SYMPTOMS: ICD-10-CM

## 2020-11-20 DIAGNOSIS — I10 ESSENTIAL HYPERTENSION WITH GOAL BLOOD PRESSURE LESS THAN 140/90: ICD-10-CM

## 2020-11-20 DIAGNOSIS — D51.0 PERNICIOUS ANEMIA: ICD-10-CM

## 2020-11-20 DIAGNOSIS — M47.817 LUMBAR AND SACRAL ARTHRITIS: ICD-10-CM

## 2020-11-20 DIAGNOSIS — N18.31 STAGE 3A CHRONIC KIDNEY DISEASE (H): ICD-10-CM

## 2020-11-20 DIAGNOSIS — K22.719 BARRETT'S ESOPHAGUS WITH DYSPLASIA: ICD-10-CM

## 2020-11-20 DIAGNOSIS — Z00.00 ROUTINE GENERAL MEDICAL EXAMINATION AT A HEALTH CARE FACILITY: ICD-10-CM

## 2020-11-20 LAB
ALBUMIN SERPL-MCNC: 4.3 G/DL (ref 3.5–5)
ALP SERPL-CCNC: 86 U/L (ref 45–120)
ALT SERPL W P-5'-P-CCNC: 17 U/L (ref 0–45)
ANION GAP SERPL CALCULATED.3IONS-SCNC: 11 MMOL/L (ref 5–18)
AST SERPL W P-5'-P-CCNC: 17 U/L (ref 0–40)
BILIRUB SERPL-MCNC: 0.7 MG/DL (ref 0–1)
BUN SERPL-MCNC: 16 MG/DL (ref 8–28)
CALCIUM SERPL-MCNC: 9.5 MG/DL (ref 8.5–10.5)
CHLORIDE BLD-SCNC: 100 MMOL/L (ref 98–107)
CHOLEST SERPL-MCNC: 157 MG/DL
CO2 SERPL-SCNC: 27 MMOL/L (ref 22–31)
CREAT SERPL-MCNC: 1.35 MG/DL (ref 0.7–1.3)
ERYTHROCYTE [DISTWIDTH] IN BLOOD BY AUTOMATED COUNT: 12.5 % (ref 11–14.5)
FASTING STATUS PATIENT QL REPORTED: NO
FERRITIN SERPL-MCNC: 45 NG/ML (ref 27–300)
FOLATE SERPL-MCNC: 14.3 NG/ML
GFR SERPL CREATININE-BSD FRML MDRD: 51 ML/MIN/1.73M2
GLUCOSE BLD-MCNC: 98 MG/DL (ref 70–125)
HCT VFR BLD AUTO: 42.5 % (ref 40–54)
HDLC SERPL-MCNC: 44 MG/DL
HGB BLD-MCNC: 14.5 G/DL (ref 14–18)
LDLC SERPL CALC-MCNC: 86 MG/DL
MCH RBC QN AUTO: 31.8 PG (ref 27–34)
MCHC RBC AUTO-ENTMCNC: 34 G/DL (ref 32–36)
MCV RBC AUTO: 94 FL (ref 80–100)
PLATELET # BLD AUTO: 218 THOU/UL (ref 140–440)
PMV BLD AUTO: 8.3 FL (ref 7–10)
POTASSIUM BLD-SCNC: 4.1 MMOL/L (ref 3.5–5)
PROT SERPL-MCNC: 7.1 G/DL (ref 6–8)
PSA SERPL-MCNC: 1 NG/ML (ref 0–6.5)
RBC # BLD AUTO: 4.54 MILL/UL (ref 4.4–6.2)
SODIUM SERPL-SCNC: 138 MMOL/L (ref 136–145)
TRIGL SERPL-MCNC: 133 MG/DL
TSH SERPL DL<=0.005 MIU/L-ACNC: 1.39 UIU/ML (ref 0.3–5)
VIT B12 SERPL-MCNC: 1017 PG/ML (ref 213–816)
WBC: 6.7 THOU/UL (ref 4–11)

## 2020-11-20 ASSESSMENT — MIFFLIN-ST. JEOR: SCORE: 1546.6

## 2020-11-30 ENCOUNTER — RECORDS - HEALTHEAST (OUTPATIENT)
Dept: ADMINISTRATIVE | Facility: OTHER | Age: 75
End: 2020-11-30

## 2020-12-10 ENCOUNTER — RECORDS - HEALTHEAST (OUTPATIENT)
Dept: ADMINISTRATIVE | Facility: OTHER | Age: 75
End: 2020-12-10

## 2020-12-15 ENCOUNTER — AMBULATORY - HEALTHEAST (OUTPATIENT)
Dept: NURSING | Facility: CLINIC | Age: 75
End: 2020-12-15

## 2021-01-12 ENCOUNTER — AMBULATORY - HEALTHEAST (OUTPATIENT)
Dept: NURSING | Facility: CLINIC | Age: 76
End: 2021-01-12

## 2021-01-12 DIAGNOSIS — E53.8 VITAMIN B12 DEFICIENCY (NON ANEMIC): ICD-10-CM

## 2021-02-09 ENCOUNTER — AMBULATORY - HEALTHEAST (OUTPATIENT)
Dept: NURSING | Facility: CLINIC | Age: 76
End: 2021-02-09

## 2021-02-12 ENCOUNTER — AMBULATORY - HEALTHEAST (OUTPATIENT)
Dept: NURSING | Facility: CLINIC | Age: 76
End: 2021-02-12

## 2021-03-05 ENCOUNTER — AMBULATORY - HEALTHEAST (OUTPATIENT)
Dept: NURSING | Facility: CLINIC | Age: 76
End: 2021-03-05

## 2021-03-07 ENCOUNTER — HEALTH MAINTENANCE LETTER (OUTPATIENT)
Age: 76
End: 2021-03-07

## 2021-03-09 ENCOUNTER — AMBULATORY - HEALTHEAST (OUTPATIENT)
Dept: NURSING | Facility: CLINIC | Age: 76
End: 2021-03-09

## 2021-03-23 ENCOUNTER — RECORDS - HEALTHEAST (OUTPATIENT)
Dept: ADMINISTRATIVE | Facility: OTHER | Age: 76
End: 2021-03-23

## 2021-03-23 ENCOUNTER — OFFICE VISIT (OUTPATIENT)
Dept: NEUROLOGY | Facility: CLINIC | Age: 76
End: 2021-03-23
Payer: MEDICARE

## 2021-03-23 VITALS
HEIGHT: 65 IN | DIASTOLIC BLOOD PRESSURE: 80 MMHG | BODY MASS INDEX: 32.49 KG/M2 | WEIGHT: 195 LBS | SYSTOLIC BLOOD PRESSURE: 146 MMHG | HEART RATE: 68 BPM

## 2021-03-23 DIAGNOSIS — G25.81 RESTLESS LEG SYNDROME: ICD-10-CM

## 2021-03-23 DIAGNOSIS — G62.9 NEUROPATHY: ICD-10-CM

## 2021-03-23 DIAGNOSIS — M79.2 NEUROPATHIC PAIN: Primary | ICD-10-CM

## 2021-03-23 PROBLEM — N18.31 STAGE 3A CHRONIC KIDNEY DISEASE (H): Status: ACTIVE | Noted: 2018-06-21

## 2021-03-23 PROBLEM — I10 HYPERTENSION: Status: ACTIVE | Noted: 2021-03-23

## 2021-03-23 PROCEDURE — 99214 OFFICE O/P EST MOD 30 MIN: CPT | Performed by: PSYCHIATRY & NEUROLOGY

## 2021-03-23 RX ORDER — GABAPENTIN 100 MG/1
CAPSULE ORAL
Qty: 540 CAPSULE | Refills: 3 | Status: SHIPPED | OUTPATIENT
Start: 2021-03-23 | End: 2021-10-11

## 2021-03-23 RX ORDER — CYCLOSPORINE 0.5 MG/ML
EMULSION OPHTHALMIC
COMMUNITY
Start: 2021-03-18 | End: 2021-11-05

## 2021-03-23 ASSESSMENT — MIFFLIN-ST. JEOR: SCORE: 1541.39

## 2021-03-23 NOTE — LETTER
3/23/2021         RE: Mickey Desouza  [address] [facility] [address]        Dear Colleague,    Thank you for referring your patient, Mickey Desouza, to the [facility] NEUROLOGY CLINIC [place]. Please see a copy of my visit note below.    NEUROLOGY OUTPATIENT PROGRESS NOTE  Mar 23, 2021     CHIEF COMPLAINT/REASON FOR VISIT/REASON FOR CONSULT  Patient presents with:  Follow Up: Neuropathy in feet    REASON FOR CONSULTATION- Neuropathy/Neuropathic pain    HISTORY OF PRESENT ILLNESS  Mickey Desouza is a 75 year old male seen for  numbness in the feet and neuropathic pain. EMG had shown a neuropathy. Blood work was negative for causes of neuropathy. He has pins-and-needles and pain for which he is using gabapentin which she does find benefit. He also reports some sensation to move his legs to get comfortable at nighttime. Ferritin came back normal. In the past we had tried Cymbalta which she only tried for little bit before his primary switched him back to the gabapentin. He has also been prescribed Lyrica in the past but could not afford it because of cost. Pain starts coming on in the evening time. Reports that sometimes the pain wakes him up in the nighttime.    He was supposed to be on gabapentin to 100 mg at 4  mg at 7:30  mg at 10:30  mg at 12:30 PM to prevent the pain from happening early in the evening and later in the morning. He is not taking the 4:00 dose. He reports that the numbness is about the same. Numbness is limited to below the ankles. Pain has been about the same maybe slightly better. Though he does have good days and bad days. Bad days about once a week. Overall things have been working well for him. Pain can be more sharp in the feet. Pain is unchanged in nature. Walking does make the pain worse for him. Denies any other neurological symptoms.    9/30/20  Patient returns today.  He reports that he continues to have numbness in his feet.  Has not  progressed.  Continues to have problems with neuropathic pain.  1 day he started having the pain earlier in the daytime.  Otherwise the pain mainly comes in the evening.  He is tried taking the gabapentin 4 times as previously discussed but that has not really helped.  Currently is taking 300 mg of gabapentin around 7 PM and then 300 mg at 9 PM.  This is the best combination is found.  Denies any side effects to the gabapentin.  Previously was having some neck pain which has improved.  He also has spinal stenosis and has been working with his doctor regarding spinal stenosis.  He further reports issues with knee pain and has been getting some injections for that.  Has been exercising 3 times a week.  This is for 45 minutes.    3/23/21  Patient returns today.  Continues to have numbness in his feet.  This has not progressed.  Continues to have neuropathic pain.  Has reduce the dose of gabapentin 100 mg at 7 PM and 100 mg in 9 PM with no worsening of his symptoms.  Overall pain is not under good control but he is satisfied with where things are.  He did watching television about some electrical stimulation of the legs which can help with nerve damage.  Discussed with him that there is no clear evidence that this will help.  He further reports that he has been exercising.  He is eating healthy.  He is social distancing.  He has had the second Covid shot 3 weeks ago.  No side effects.    Previous history is reviewed and this is unchanged.    PAST MEDICAL/SURGICAL HISTORY  Past Medical History:   Diagnosis Date     Cancer (H)      Hypertension 3/23/2021     Patient Active Problem List   Diagnosis     Urinary frequency     Stage 3a chronic kidney disease     Restless legs syndrome     Neuropathy     Neuropathic pain     Neck pain, chronic     Hypertension     Allergic rhinitis     Burning sensation of feet   Hx of carpal tunnel surgery bilaterally      FAMILY HISTORY  Family History   Problem Relation Age of Onset      "Coronary Artery Disease Father    Heart attack: Father.      SOCIAL HISTORY  Social History     Tobacco Use     Smoking status: Never Smoker     Smokeless tobacco: Never Used   Substance Use Topics     Alcohol use: Not Currently     Drug use: Never       SYSTEMS REVIEW  Twelve-system ROS was done and other than the HPI this was negative.     MEDICATIONS  aspirin (ASA) 81 MG chewable tablet,   Instructions: Take 81 mg by mouth daily., Type: Maintenance  cycloSPORINE (RESTASIS) 0.05 % ophthalmic emulsion,   gabapentin (NEURONTIN) 100 MG capsule, See Instructions, Instructions: 100 mg at 4PM; 300 mg at 7:30 PM; 100 mg at 10:30 PM; 200 mg at 12:30 PM., # 630 cap(s), 0 Refill(s), Type: Maintenance, Pharmacy: [facility]/pharmacy #[id number], 100 mg at 4PM; 300 mg at 7:30 PM; 100 mg at 10:30 PM; 200 mg at 12:3... (Patient taking differently: 200 mg 2 times daily )  glucosamine-chondroitin 500-400 MG CAPS per capsule,   Instructions: Take 1 capsule by mouth 2 (two) times a day., Type: Maintenance  losartan-hydrochlorothiazide (HYZAAR) 50-12.5 MG tablet,   Instructions: Take 1 tablet by mouth daily., Type: Maintenance  Multiple Vitamins-Minerals (DAILY MULTIVITAMIN PO),   Instructions: Take 1 tablet by mouth daily., Type: Maintenance  omeprazole (PRILOSEC) 20 MG DR capsule,   traMADol (ULTRAM) 50 MG tablet,     No current facility-administered medications on file prior to visit.        PHYSICAL EXAMINATION  VITALS: BP (!) 146/80   Pulse 68   Ht 1.651 m (5' 5\")   Wt 88.5 kg (195 lb)   BMI 32.45 kg/m    GENERAL: Healthy appearing, alert, no acute distress, normal habitus.   NEUROLOGICAL: Patient is awake and oriented to self, place and time. Attention span is normal. Memory is intact. Language is fluent and follows commands appropriately. Appropriate fund of knowledge. Cranial nerves 2-12 are intact. There is no pronator drift. Motor exam shows 5/5 strength in all extremities tested proximally. Tone is symmetric bilaterally in upper " and lower extremities. Reflexes are symmetric and 2+ in upper extremities and absent in lower extremities. Sensory exam shows decreased pin prick to mid shin level/above ankle level.  Decreased vibration up to the knees.  Finger to nose and heel to shin is without dysmetria. Romberg is negative. Gait is normal. Patient is unsteady with walking tandem.    DIAGNOSTICS  EMG  CLINICAL INTERPRETATION:  This is a abnormal nerve conduction and EMG study of both lower extremities. The study is consistent with a peripheral neuropathy. Further clinical correlation is needed.     RELEVANT LABS  Ferritin 72  Glucose Level: 105 (12/11/18 07:25:00)   Glucose Fasting: Yes (12/11/18 07:25:00)   TSH Cascade: 1.79 (12/11/18 07:25:00)   Lyme Ab: 0.05 (12/11/18 07:25:00)   SPEP = normal  B12 = normal    IMPRESSION/REPORT/PLAN  Neuropathy  Neuropathic pain  Restless leg syndrome    This is a 75 year old male  idiopathic neuropathy, neuropathic pain, question of restless leg syndrome.  We will continue gabapentin 100 mg at 7 PM and 100 mg at 9 PM since this is the best dosing he is found.   Ferritin was negative and is unclear if he actually has restless leg syndrome or not. Lyrica could be tried since it is generic. Could consider retrying the Cymbalta though he feels that the gabapentin does help him sleep. I will see him back in 12 months per his request.  We will refill gabapentin for 1 year.  Patient will consider Lyrica in 6 months depending on how his current therapy is working.    Over 30 minutes were spent coordinating the care for the patient on the day of the encounter.  This includes previsit, during visit and post visit activities as documented above.  (Activities include but not inclusive of reviewing chart, reviewing outside records, reviewing labs and imaging study results as well as the images, patient visit time including getting history and exam,  use if applicable, review of test results with the patient  and coming up with a plan in a shared model, counseling patient and family, education and answering patient questions, EMR , EMR diagnosis entry and problem list management, medication reconciliation and prescription management if applicable, paperwork if applicable, printing documents and documentation of the visit activities.)      Kulwinder Adame MD  Neurologist  [facility]  Tel:- [phone]    This note was dictated using voice recognition software.  Any grammatical or context distortions are unintentional and inherent to the software.        Again, thank you for allowing me to participate in the care of your patient.        Sincerely,        Kulwinder Adame MD

## 2021-03-23 NOTE — PROGRESS NOTES
NEUROLOGY OUTPATIENT PROGRESS NOTE  Mar 23, 2021     CHIEF COMPLAINT/REASON FOR VISIT/REASON FOR CONSULT  Patient presents with:  Follow Up: Neuropathy in feet    REASON FOR CONSULTATION- Neuropathy/Neuropathic pain    HISTORY OF PRESENT ILLNESS  Mickey Desouza is a 75 year old male seen for  numbness in the feet and neuropathic pain. EMG had shown a neuropathy. Blood work was negative for causes of neuropathy. He has pins-and-needles and pain for which he is using gabapentin which she does find benefit. He also reports some sensation to move his legs to get comfortable at nighttime. Ferritin came back normal. In the past we had tried Cymbalta which she only tried for little bit before his primary switched him back to the gabapentin. He has also been prescribed Lyrica in the past but could not afford it because of cost. Pain starts coming on in the evening time. Reports that sometimes the pain wakes him up in the nighttime.    He was supposed to be on gabapentin to 100 mg at 4  mg at 7:30  mg at 10:30  mg at 12:30 PM to prevent the pain from happening early in the evening and later in the morning. He is not taking the 4:00 dose. He reports that the numbness is about the same. Numbness is limited to below the ankles. Pain has been about the same maybe slightly better. Though he does have good days and bad days. Bad days about once a week. Overall things have been working well for him. Pain can be more sharp in the feet. Pain is unchanged in nature. Walking does make the pain worse for him. Denies any other neurological symptoms.    9/30/20  Patient returns today.  He reports that he continues to have numbness in his feet.  Has not progressed.  Continues to have problems with neuropathic pain.  1 day he started having the pain earlier in the daytime.  Otherwise the pain mainly comes in the evening.  He is tried taking the gabapentin 4 times as previously discussed but that has not really helped.   Currently is taking 300 mg of gabapentin around 7 PM and then 300 mg at 9 PM.  This is the best combination is found.  Denies any side effects to the gabapentin.  Previously was having some neck pain which has improved.  He also has spinal stenosis and has been working with his doctor regarding spinal stenosis.  He further reports issues with knee pain and has been getting some injections for that.  Has been exercising 3 times a week.  This is for 45 minutes.    3/23/21  Patient returns today.  Continues to have numbness in his feet.  This has not progressed.  Continues to have neuropathic pain.  Has reduce the dose of gabapentin 100 mg at 7 PM and 100 mg in 9 PM with no worsening of his symptoms.  Overall pain is not under good control but he is satisfied with where things are.  He did watching television about some electrical stimulation of the legs which can help with nerve damage.  Discussed with him that there is no clear evidence that this will help.  He further reports that he has been exercising.  He is eating healthy.  He is social distancing.  He has had the second Covid shot 3 weeks ago.  No side effects.    Previous history is reviewed and this is unchanged.    PAST MEDICAL/SURGICAL HISTORY  Past Medical History:   Diagnosis Date     Cancer (H)      Hypertension 3/23/2021     Patient Active Problem List   Diagnosis     Urinary frequency     Stage 3a chronic kidney disease     Restless legs syndrome     Neuropathy     Neuropathic pain     Neck pain, chronic     Hypertension     Allergic rhinitis     Burning sensation of feet   Hx of carpal tunnel surgery bilaterally      FAMILY HISTORY  Family History   Problem Relation Age of Onset     Coronary Artery Disease Father    Heart attack: Father.      SOCIAL HISTORY  Social History     Tobacco Use     Smoking status: Never Smoker     Smokeless tobacco: Never Used   Substance Use Topics     Alcohol use: Not Currently     Drug use: Never       SYSTEMS  "REVIEW  Twelve-system ROS was done and other than the HPI this was negative.     MEDICATIONS  aspirin (ASA) 81 MG chewable tablet,   Instructions: Take 81 mg by mouth daily., Type: Maintenance  cycloSPORINE (RESTASIS) 0.05 % ophthalmic emulsion,   gabapentin (NEURONTIN) 100 MG capsule, See Instructions, Instructions: 100 mg at 4PM; 300 mg at 7:30 PM; 100 mg at 10:30 PM; 200 mg at 12:30 PM., # 630 cap(s), 0 Refill(s), Type: Maintenance, Pharmacy: [facility]/pharmacy #[id number], 100 mg at 4PM; 300 mg at 7:30 PM; 100 mg at 10:30 PM; 200 mg at 12:3... (Patient taking differently: 200 mg 2 times daily )  glucosamine-chondroitin 500-400 MG CAPS per capsule,   Instructions: Take 1 capsule by mouth 2 (two) times a day., Type: Maintenance  losartan-hydrochlorothiazide (HYZAAR) 50-12.5 MG tablet,   Instructions: Take 1 tablet by mouth daily., Type: Maintenance  Multiple Vitamins-Minerals (DAILY MULTIVITAMIN PO),   Instructions: Take 1 tablet by mouth daily., Type: Maintenance  omeprazole (PRILOSEC) 20 MG DR capsule,   traMADol (ULTRAM) 50 MG tablet,     No current facility-administered medications on file prior to visit.        PHYSICAL EXAMINATION  VITALS: BP (!) 146/80   Pulse 68   Ht 1.651 m (5' 5\")   Wt 88.5 kg (195 lb)   BMI 32.45 kg/m    GENERAL: Healthy appearing, alert, no acute distress, normal habitus.   NEUROLOGICAL: Patient is awake and oriented to self, place and time. Attention span is normal. Memory is intact. Language is fluent and follows commands appropriately. Appropriate fund of knowledge. Cranial nerves 2-12 are intact. There is no pronator drift. Motor exam shows 5/5 strength in all extremities tested proximally. Tone is symmetric bilaterally in upper and lower extremities. Reflexes are symmetric and 2+ in upper extremities and absent in lower extremities. Sensory exam shows decreased pin prick to mid shin level/above ankle level.  Decreased vibration up to the knees.  Finger to nose and heel to shin is without " dysmetria. Romberg is negative. Gait is normal. Patient is unsteady with walking tandem.    DIAGNOSTICS  EMG  CLINICAL INTERPRETATION:  This is a abnormal nerve conduction and EMG study of both lower extremities. The study is consistent with a peripheral neuropathy. Further clinical correlation is needed.     RELEVANT LABS  Ferritin 72  Glucose Level: 105 (12/11/18 07:25:00)   Glucose Fasting: Yes (12/11/18 07:25:00)   TSH Cascade: 1.79 (12/11/18 07:25:00)   Lyme Ab: 0.05 (12/11/18 07:25:00)   SPEP = normal  B12 = normal    IMPRESSION/REPORT/PLAN  Neuropathy  Neuropathic pain  Restless leg syndrome    This is a 75 year old male  idiopathic neuropathy, neuropathic pain, question of restless leg syndrome.  We will continue gabapentin 100 mg at 7 PM and 100 mg at 9 PM since this is the best dosing he is found.   Ferritin was negative and is unclear if he actually has restless leg syndrome or not. Lyrica could be tried since it is generic. Could consider retrying the Cymbalta though he feels that the gabapentin does help him sleep. I will see him back in 12 months per his request.  We will refill gabapentin for 1 year.  Patient will consider Lyrica in 6 months depending on how his current therapy is working.    Over 30 minutes were spent coordinating the care for the patient on the day of the encounter.  This includes previsit, during visit and post visit activities as documented above.  (Activities include but not inclusive of reviewing chart, reviewing outside records, reviewing labs and imaging study results as well as the images, patient visit time including getting history and exam,  use if applicable, review of test results with the patient and coming up with a plan in a shared model, counseling patient and family, education and answering patient questions, EMR , EMR diagnosis entry and problem list management, medication reconciliation and prescription management if applicable, paperwork if  applicable, printing documents and documentation of the visit activities.)      Kulwinder Adame MD  Neurologist  [facility]  Tel:- [phone]    This note was dictated using voice recognition software.  Any grammatical or context distortions are unintentional and inherent to the software.

## 2021-04-06 ENCOUNTER — AMBULATORY - HEALTHEAST (OUTPATIENT)
Dept: NURSING | Facility: CLINIC | Age: 76
End: 2021-04-06

## 2021-04-07 ENCOUNTER — COMMUNICATION - HEALTHEAST (OUTPATIENT)
Dept: SCHEDULING | Facility: CLINIC | Age: 76
End: 2021-04-07

## 2021-04-21 ENCOUNTER — OFFICE VISIT - HEALTHEAST (OUTPATIENT)
Dept: INTERNAL MEDICINE | Facility: CLINIC | Age: 76
End: 2021-04-21

## 2021-04-21 DIAGNOSIS — M48.062 SPINAL STENOSIS, LUMBAR REGION, WITH NEUROGENIC CLAUDICATION: ICD-10-CM

## 2021-04-21 DIAGNOSIS — K86.2 PANCREATIC CYST: ICD-10-CM

## 2021-04-21 DIAGNOSIS — G62.9 POLYNEUROPATHY: ICD-10-CM

## 2021-04-21 DIAGNOSIS — I10 ESSENTIAL HYPERTENSION WITH GOAL BLOOD PRESSURE LESS THAN 140/90: ICD-10-CM

## 2021-04-21 DIAGNOSIS — M47.817 LUMBAR AND SACRAL ARTHRITIS: ICD-10-CM

## 2021-04-21 DIAGNOSIS — N40.1 BPH WITH OBSTRUCTION/LOWER URINARY TRACT SYMPTOMS: ICD-10-CM

## 2021-04-21 DIAGNOSIS — N13.8 BPH WITH OBSTRUCTION/LOWER URINARY TRACT SYMPTOMS: ICD-10-CM

## 2021-04-21 DIAGNOSIS — N18.31 STAGE 3A CHRONIC KIDNEY DISEASE (H): ICD-10-CM

## 2021-04-21 DIAGNOSIS — J30.9 ALLERGIC SINUSITIS: ICD-10-CM

## 2021-04-21 DIAGNOSIS — K21.00 GASTROESOPHAGEAL REFLUX DISEASE WITH ESOPHAGITIS WITHOUT HEMORRHAGE: ICD-10-CM

## 2021-04-21 DIAGNOSIS — K22.719 BARRETT'S ESOPHAGUS WITH DYSPLASIA: ICD-10-CM

## 2021-04-21 ASSESSMENT — MIFFLIN-ST. JEOR: SCORE: 1564.74

## 2021-05-04 ENCOUNTER — AMBULATORY - HEALTHEAST (OUTPATIENT)
Dept: NURSING | Facility: CLINIC | Age: 76
End: 2021-05-04

## 2021-05-11 ENCOUNTER — RECORDS - HEALTHEAST (OUTPATIENT)
Dept: ADMINISTRATIVE | Facility: OTHER | Age: 76
End: 2021-05-11

## 2021-05-27 ENCOUNTER — RECORDS - HEALTHEAST (OUTPATIENT)
Dept: ADMINISTRATIVE | Facility: CLINIC | Age: 76
End: 2021-05-27

## 2021-05-28 ENCOUNTER — RECORDS - HEALTHEAST (OUTPATIENT)
Dept: ADMINISTRATIVE | Facility: CLINIC | Age: 76
End: 2021-05-28

## 2021-05-28 NOTE — TELEPHONE ENCOUNTER
"Patients wife calling reporting that patient \"for the last couple of weeks when Mickey has been taking losartan-HCT he has been getting heartburn. This has been going on everyday for the past month, he takes the losartan right after breakfast in the morning and 30 minutes later he gets heartburn.\"     Patient takes omeprazole everyday.    Per protocol, PCP to advise within 24 hours.    Patient's wife is wondering if the losartan could be contributing to the heartburn and if so is there an alternative medication that could be prescribed? Please advise.    Reason for Disposition    Caller has NON-URGENT medication question about med that PCP prescribed and triager unable to answer question    Protocols used: MEDICATION QUESTION CALL-A-AH      "

## 2021-05-28 NOTE — TELEPHONE ENCOUNTER
Left message for Lucy Umanzor/Mickey to call back to answer questions below and give medication instruction per Lalit Parada MD.    Also, do they need a refill on the omeprazole?    Please give MD message below upon return call and route back to site if they have any further questions.    Thank you.    Estephania LATHAM LPN .......... 9:03 AM  05/03/19

## 2021-05-28 NOTE — TELEPHONE ENCOUNTER
Dr. Parada,  Spoke with the patient's wife, Lucy Mcfarland, and verified that the patient has enough of the medication to wait until you return for you to review the message below.  She states he does have enough to wait.      Please review the message below from the pharmacy in regards to the losartan-hydrochlorothiazide 50-12.5 mg being on back order.  How would you like to proceed?  Please advise.  Thank you.  Christina STEINER CMA/TANYA....................12:14 PM

## 2021-05-28 NOTE — TELEPHONE ENCOUNTER
Blood pressure medicine does not usually cause reflux  Make sure you not taking in more citrus juice etc.  Increase the omeprazole to twice a day for 2 weeks.  This should do it.  Continue the BP meds  If persists office appointment

## 2021-05-28 NOTE — TELEPHONE ENCOUNTER
Medication Question or Clarification  Who is calling: Pharmacy: [facility] from [facility]  What medication are you calling about? (include dose and sig)   Disp Refills Start End     losartan-hydrochlorothiazide (HYZAAR) 50-12.5 mg per tablet 90 tablet 3 8/29/2018     Sig - Route: Take 1 tablet by mouth daily. - Oral    Sent to pharmacy as: losartan-hydrochlorothiazide (HYZAAR) 50-12.5 mg per tablet    E-Prescribing Status: Receipt confirmed by pharmacy (8/29/2018  6:07 PM CDT)        Who prescribed the medication?: Lalit Parada MD   What is your question/concern?: Caller stated this Rx is on back order to due the recall. Caller is asking to get this Rx as separate prescriptions.  Pharmacy: [facility]  Okay to leave a detailed message?: No  Site CMT - Please call the pharmacy to obtain any additional needed information.

## 2021-05-28 NOTE — TELEPHONE ENCOUNTER
Patient Returning Call  Reason for call:   medication  Information relayed to patient:  Left message for Julianne/Mickey to call back to answer questions below and give medication instruction per Lalit Parada MD.     Also, do they need a refill on the omeprazole?     Please give MD message below upon return call and route back to site if they have any further questions.     Thank you.  Patient has additional questions:  No  If YES, what are your questions/concerns:  none  Okay to leave a detailed message?: No call back needed

## 2021-05-30 ENCOUNTER — RECORDS - HEALTHEAST (OUTPATIENT)
Dept: ADMINISTRATIVE | Facility: CLINIC | Age: 76
End: 2021-05-30

## 2021-05-30 NOTE — PROGRESS NOTES
OFFICE VISIT NOTE  Mickey Desouza   74 y.o. male            Assessment/Plan for  Mickey Desouza is a 74 y.o. male.  No Patient Care Coordination Note on file.       1. Polyneuropathy  Change gabapentin to 100 at dinner 300 at bedtime.  He is really only bothered from the evening on.  A little bit of grogginess.  - gabapentin (NEURONTIN) 300 MG capsule; Take 1 capsule (300 mg total) by mouth at bedtime.  Dispense: 90 capsule; Refill: 3  - gabapentin (NEURONTIN) 100 MG capsule; 90TAKE 1 CAPSULE (100 MGS) BY MOUTH AT BEDTIME.  Dispense: 90 capsule; Refill: 3    2. Essential hypertension  Excellent control    3. CKD (chronic kidney disease) stage 3, GFR 30-59 ml/min (H)  Stable hydration avoid nonsteroidals including glucosamine which she does take.  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 Dyspepsia  With gastropathy and short segment Norton's  Increase Prilosec twice daily x1 month then continue daily    6. 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 Gastropathy     - omeprazole (PRILOSEC) 20 MG capsule; Take 1 capsule (20 mg total) by mouth 2 (two) times a day before meals.  Dispense: 180 capsule; Refill: 1         Plan:  Gabapentin adjustment as above  Prilosec twice daily  Laboratory  Wellness 6 months    There are no Patient Instructions on file for this visit.    Diagnoses and all orders for this visit:    Polyneuropathy  -     gabapentin (NEURONTIN) 300 MG capsule; Take 1 capsule (300 mg total) by mouth at bedtime.  Dispense: 90 capsule; Refill: 3  -     gabapentin (NEURONTIN) 100 MG capsule; 90TAKE 1 CAPSULE (100 MGS) BY MOUTH AT BEDTIME.  Dispense: 90 capsule; Refill: 3    Essential hypertension    CKD (chronic kidney disease) stage 3, GFR 30-59 ml/min (H)  -     HM1(CBC and Differential)  -     Basic Metabolic Panel  -     HM1 (CBC with Diff)    BPH with obstruction/lower urinary tract  symptoms    Dyspepsia    Norton's esophagus without dysplasia    Gastropathy  -     omeprazole (PRILOSEC) 20 MG capsule; Take 1 capsule (20 mg total) by mouth 2 (two) times a day before meals.  Dispense: 180 capsule; Refill: 1        Medications after visit  Current Outpatient Medications   Medication Sig Dispense Refill     aspirin 81 MG EC tablet Take 81 mg by mouth daily.       cyanocobalamin, vitamin B-12, (B-12 COMPLIANCE) 1,000 mcg/mL Kit Inject 1 mL into the shoulder, thigh, or buttocks.       diclofenac (VOLTAREN) 75 MG EC tablet Take 1 tablet by mouth daily as needed.  1     gabapentin (NEURONTIN) 100 MG capsule 90TAKE 1 CAPSULE (100 MGS) BY MOUTH AT BEDTIME. 90 capsule 3     gabapentin (NEURONTIN) 300 MG capsule Take 1 capsule (300 mg total) by mouth at bedtime. 90 capsule 3     glucosamine-chondroitin 500-400 mg cap Take 1 capsule by mouth 2 (two) times a day.       losartan-hydrochlorothiazide (HYZAAR) 50-12.5 mg per tablet Take 1 tablet by mouth daily. 90 tablet 3     multivitamin with minerals (THERA-M) 9 mg iron-400 mcg Tab tablet Take 1 tablet by mouth daily.       omeprazole (PRILOSEC) 20 MG capsule Take 20 mg by mouth daily.       traMADol (ULTRAM) 50 mg tablet Take 50 mg by mouth as needed for pain.       omeprazole (PRILOSEC) 20 MG capsule Take 1 capsule (20 mg total) by mouth 2 (two) times a day before meals. 180 capsule 1     Current Facility-Administered Medications   Medication Dose Route Frequency Provider Last Rate Last Dose     cyanocobalamin injection 1,000 mcg  1,000 mcg Intramuscular Q30 Days Lalit Parada MD   1,000 mcg at 07/24/19 0744                       Lalit Parada MD  Internal medicine  [facility]  [phone]  [email or web address]    Much or all of the text in this note was generated through the use of Dragon Dictate voice-to-text software. Errors in spelling or words which seem out of context are unintentional.   Sound alike errors,  in particular, may have escaped editing.                 Subjective:   Chief Complaint:  Hypertension; B12 Injection; Medication Question Or Clarification (Discuss losartan and aspirin); and Medication Refill    Follow-up    Polyneuropathy stable  He is a bit sedated from it  Problems from 6:30 in the evening until he sleeps.  He takes 400 at 7:00.  He has not broken it down to evening/at bedtime  Uses occasional Voltaren uses occasional tramadol uses occasional glucosamine not regular.    Dyspepsia a.m.  Relief with Tums.  On Prilosec.  EGD 12/18-reviewed.  Gastropathy noted.  No ulcer.  Short segment Norton's.  On Norton's surveillance regular.    Hypertension- pliant.  No CV CNS complaints.  No orthostasis  Review of Systems:     Extensive 10-point review of systems was performed. Please see the HPI for problem specific pertinent review of systems.     Patient does note appetite okay bowels okay no melena    Otherwise, the following systems are noncontributory including constitutional, eyes, ears, nose and throat, cardiovascular, respiratory, gastrointestinal, genitourinary, musculoskeletal,neurological, skin and/or breast, endocrine, hematologic/lymph, allergic/immunologic and psychiatric.              Medications:  Current Outpatient Medications on File Prior to Visit   Medication Sig     aspirin 81 MG EC tablet Take 81 mg by mouth daily.     cyanocobalamin, vitamin B-12, (B-12 COMPLIANCE) 1,000 mcg/mL Kit Inject 1 mL into the shoulder, thigh, or buttocks.     diclofenac (VOLTAREN) 75 MG EC tablet Take 1 tablet by mouth daily as needed.     glucosamine-chondroitin 500-400 mg cap Take 1 capsule by mouth 2 (two) times a day.     losartan-hydrochlorothiazide (HYZAAR) 50-12.5 mg per tablet Take 1 tablet by mouth daily.     multivitamin with minerals (THERA-M) 9 mg iron-400 mcg Tab tablet Take 1 tablet by mouth daily.     omeprazole (PRILOSEC) 20 MG capsule Take 20 mg by mouth daily.     traMADol (ULTRAM) 50 mg  "tablet Take 50 mg by mouth as needed for pain.     [DISCONTINUED] gabapentin (NEURONTIN) 100 MG capsule TAKE 1 CAPSULE (100 MGS) BY MOUTH AT BEDTIME.     [DISCONTINUED] gabapentin (NEURONTIN) 300 MG capsule Take 300 mg by mouth at bedtime.           Current Facility-Administered Medications on File Prior to Visit   Medication     cyanocobalamin injection 1,000 mcg            Allergies:  Allergies   Allergen Reactions     Morphine      Causes upset stomach and pain;     Ciprofloxacin Other (See Comments)     Codeine        PSFHx: Tobacco Status:  He  reports that he has never smoked. He has never used smokeless tobacco.   Alcohol Status:    Social History     Substance and Sexual Activity   Alcohol Use Yes    Comment: occasionally, beer       reports that he has never smoked. He has never used smokeless tobacco. He reports that he drinks alcohol. He reports that he does not use drugs.    Objective:    /82 (Patient Site: Right Arm, Patient Position: Sitting, Cuff Size: Adult Regular)   Pulse 73   Ht 5' 4.5\" (1.638 m)   Wt 204 lb 1.9 oz (92.6 kg)   SpO2 97%   BMI 34.50 kg/m    Weight:   Wt Readings from Last 3 Encounters:   07/24/19 204 lb 1.9 oz (92.6 kg)   01/24/19 201 lb (91.2 kg)   12/21/18 201 lb (91.2 kg)     BP Readings from Last 10 Encounters:   07/24/19 126/82   01/24/19 108/72   12/21/18 146/71   12/14/18 120/80   10/15/18 132/76   07/26/18 128/74   06/21/18 122/70   04/25/18 126/78   10/05/17 124/82   12/15/16 136/80         General-appears well, no acute distress.     Skin: Normal. No rash or lesion  Head:  Normocephalic, symmetric  Speech-clear  Eyes: Eyes midline full EOM.  External exams normal.  No icterus  Neck:  No palpable masses, lymphadenopathy or tenderness. No thyromegaly or goiter  Carotid Arteries:  Equal pulsations bilateral.No Bruit, normal upstroke  Chest Wall: No deformity or pain elicited on compression.  Respiratory:  Normal respiratory effort.  Lungs are clear with good breath " sounds.  No dullness.  No wheezing.  Heart: Regular rhythm.  Normal sounding S1, S2 without S3, S4, murmurs, rubs, or gallops.    Foot exam  Decreased light touch  Excellent pulses  Normal capillary refill  Warm  Gait unremarkable      Review of clinical lab tests  Lab Results   Component Value Date    WBC 8.9 10/15/2018    HGB 13.9 (L) 10/15/2018    HCT 41.3 10/15/2018     10/15/2018    ALT 55 (H) 10/15/2018    AST 30 10/15/2018     10/15/2018    K 3.5 12/14/2018     10/15/2018    CREATININE 1.42 (H) 10/15/2018    BUN 22 10/15/2018    CO2 25 10/15/2018    TSH 1.79 12/11/2018    PSA 1.0 03/15/2016    INR 1.31 (H) 06/14/2009    HGBA1C 6.0 04/25/2018       Glucose   Date/Time Value Ref Range Status   12/11/2018 07:25  70 - 125 mg/dL Final   10/15/2018 08:37  70 - 125 mg/dL Final   07/26/2018 08:35  70 - 125 mg/dL Final   04/25/2018 08:53 AM 79 70 - 125 mg/dL Final   12/15/2016 08:27  74 - 125 mg/dL Final   09/29/2016 09:06  74 - 125 mg/dL Final   03/15/2016 09:05 AM 84 70 - 125 mg/dL Final   11/19/2015 01:50  74 - 125 mg/dL Final     No results found for this or any previous visit (from the past 24 hour(s)).    RADIOLOGY: No results found.    Review of recent consultation-reviewed EGD 1214

## 2021-05-31 ENCOUNTER — RECORDS - HEALTHEAST (OUTPATIENT)
Dept: ADMINISTRATIVE | Facility: CLINIC | Age: 76
End: 2021-05-31

## 2021-05-31 VITALS — HEIGHT: 65 IN | WEIGHT: 202.08 LBS | BODY MASS INDEX: 33.67 KG/M2

## 2021-06-01 ENCOUNTER — RECORDS - HEALTHEAST (OUTPATIENT)
Dept: ADMINISTRATIVE | Facility: CLINIC | Age: 76
End: 2021-06-01

## 2021-06-01 ENCOUNTER — AMBULATORY - HEALTHEAST (OUTPATIENT)
Dept: NURSING | Facility: CLINIC | Age: 76
End: 2021-06-01

## 2021-06-01 VITALS — BODY MASS INDEX: 33.99 KG/M2 | WEIGHT: 204.04 LBS | HEIGHT: 65 IN

## 2021-06-01 VITALS — HEIGHT: 65 IN | BODY MASS INDEX: 33.99 KG/M2 | WEIGHT: 204 LBS

## 2021-06-01 VITALS — HEIGHT: 65 IN | WEIGHT: 203.08 LBS | BODY MASS INDEX: 33.84 KG/M2

## 2021-06-02 VITALS — WEIGHT: 201 LBS | BODY MASS INDEX: 33.49 KG/M2 | HEIGHT: 65 IN

## 2021-06-02 VITALS — WEIGHT: 201 LBS | HEIGHT: 65 IN | BODY MASS INDEX: 33.49 KG/M2

## 2021-06-02 VITALS — WEIGHT: 208.04 LBS | HEIGHT: 65 IN | BODY MASS INDEX: 34.66 KG/M2

## 2021-06-02 VITALS — WEIGHT: 201 LBS | BODY MASS INDEX: 33.97 KG/M2

## 2021-06-02 NOTE — TELEPHONE ENCOUNTER
Appt made for 10/18 with Dr. Parada.     Left a detailed message with patient regarding appt date and time.     Tova Gleason LPN

## 2021-06-02 NOTE — TELEPHONE ENCOUNTER
Refill Approved    Rx renewed per Medication Renewal Policy. Medication was last renewed on 8/29/18.    Malena Lawrence, [facility] Triage/Med Refill 10/4/2019     Requested Prescriptions   Pending Prescriptions Disp Refills     losartan-hydrochlorothiazide (HYZAAR) 50-12.5 mg per tablet [Pharmacy Med Name: LOSARTAN-HCTZ 50-12.5 MG TAB] 90 tablet 3     Sig: TAKE 1 TABLET BY MOUTH DAILY.       Diuretics/Combination Diuretics Refill Protocol  Passed - 10/3/2019  1:42 PM        Passed - Visit with PCP or prescribing provider visit in past 12 months     Last office visit with prescriber/PCP: Visit date not found OR same dept: 7/24/2019 Lalit Parada MD OR same specialty: 7/24/2019 Lalit Parada MD  Last physical: Visit date not found Last MTM visit: Visit date not found   Next visit within 3 mo: Visit date not found  Next physical within 3 mo: Visit date not found  Prescriber OR PCP: Jf Diallo MD  Last diagnosis associated with med order: 1. Essential hypertension  - losartan-hydrochlorothiazide (HYZAAR) 50-12.5 mg per tablet [Pharmacy Med Name: LOSARTAN-HCTZ 50-12.5 MG TAB]; Take 1 tablet by mouth daily.  Dispense: 90 tablet; Refill: 3    If protocol passes may refill for 12 months if within 3 months of last provider visit (or a total of 15 months).             Passed - Serum Potassium in past 12 months      Lab Results   Component Value Date    Potassium 4.2 07/24/2019             Passed - Serum Sodium in past 12 months      Lab Results   Component Value Date    Sodium 138 07/24/2019             Passed - Blood pressure on file in past 12 months     BP Readings from Last 1 Encounters:   07/24/19 126/82             Passed - Serum Creatinine in past 12 months      Creatinine   Date Value Ref Range Status   07/24/2019 1.33 (H) 0.70 - 1.30 mg/dL Final

## 2021-06-02 NOTE — TELEPHONE ENCOUNTER
Dr. Parada,  Pharmacy requested a new prescription to be set up for you to review, which has been done.  Please review.  Thank you.  Christina STEINER, BONILLA/TANYA....................9:19 AM

## 2021-06-02 NOTE — TELEPHONE ENCOUNTER
Spoke with Jamar at the patient's pharmacy and verified that they have received the new prescription from Dr. Parada that was sent this morning.  She states that they have and she had no further questions at this time.  Christina STEINER CMA/TANYA....................9:07 AM

## 2021-06-02 NOTE — TELEPHONE ENCOUNTER
New Appointment Needed  What is the reason for the visit:    Pre-Op Appt Request  When is the surgery? :  11/15/19  Where is the surgery?:   [facility]   Who is the surgeon? :    Dr Salter                           What type of surgery is being done?: ENDOSCOPIC ULTRASOUND      Provider Preference: PCP only  How soon do you need to be seen?: Within 30 day before the procedure.   Waitlist offered?: No  Okay to leave a detailed message:  Yes

## 2021-06-02 NOTE — PROGRESS NOTES
INTERNAL MEDICINE PREOPERATIVE CONSULTATION  iMckey Desouza   74 y.o. male      This is a preoperative consultation requested by [facility] Dr Fidel Vizcarra in preparation for endoscopic ultrasound in about 2 weeks at [facility], fax [phone]              Assessment/Plan for  Mickey Desouza is a 74 y.o. male.  No Patient Care Coordination Note on file.       1.  Pancreatic cyst under endoscopic ultrasonic surveillance.  Cyst has been shrinking in size over the years.  2.  Norton's esophagus  3.  Hypertension-controlled  4.  Neuropathy on B12.  Neurologist asked iron to be checked      Plan:  1.  Patient is cleared for anesthesia/endoscopic procedure with possible biopsy if warranted  2.  Discontinue diclofenac.  Given his Norton's.  Use tramadol instead which he only uses about once a week  3.  Continue current antihypertensive  4.  Laboratory drawn  5.  Routine ECG-performed        Thank you for this consultation. Doctor Dr. Fidel baer if you have any questions or concerns regarding this patient or recommendations please do not hesitate to contact me at phone number [phone]      Lalit Parada MD  Internal medicine  [facility]  [phone]  [email or web address]    This provider spent greater than 40 min. face-to-face time with the patient and/or his family.  More than half this time was spent in counseling and or coordination of care which was consistent with the nature of this patient's problems which are listed and described in the assessment and plan.              Diagnoses and all orders for this visit:    Pancreatic cyst    Essential hypertension  -     Electrocardiogram Perform - Clinic    Gastroesophageal reflux disease with esophagitis    Norton's esophagus without dysplasia    Neuropathy  -     cyanocobalamin injection 1,000 mcg  -     HM1(CBC and Differential)  -     Renal Function Profile  -     Iron and Transferrin Iron Binding Capacity  -      Ferritin  -     HM1 (CBC with Diff)  -     traMADol (ULTRAM) 50 mg tablet; Take 1 tablet (50 mg total) by mouth as needed for pain.  Dispense: 30 tablet; Refill: 0    Functional dyspepsia   -     Iron and Transferrin Iron Binding Capacity    Other somatoform disorders   -     Ferritin        Lalit Parada MD  Internal medicine  [facility]  [phone]      Total time spent with the patient today was greater than 40 minutes of which > 50% was spent in counseling and coordination of care    This is an electronically verified report by Lalit Parada M.D.  (Note created with Dragon voice recognition and unintended spelling errors and word substitutions may occur)        SUBJECTIVE  Chief Complaint:  Pre-op Exam (pt states that he needs a test for Iron- he also has questions regarding his B12 and his losartan medication-endoscopic US at [place])    Patient is scheduled for routine endoscopic ultrasonic surveillance of a pancreatic lesion.  This is been followed for some time.  Is actually getting smaller in size.  I believe he was aspirated last year.  He is totally asymptomatic    Patient is on chronic omeprazole for Norton's.  Last endoscopy 10/18 noted no atypia    He feels well    He is active    Hypertension-There are no cardiovascular, respiratory, neurologic complaints.No claudication.There is no orthostasis.Patient is compliant with medications.  Medications reviewed.   No side effects from medication.    He does have peripheral neuropathy.  Is on monthly B12.  He is followed by neurology.  He is on gabapentin    Recent antiplatelet use: no  Steroid use in the past year: no  Personal or family history of difficulty with anesthesia: no  Current cardiopulmonary symptoms: no  Diabetes: no  Sleep Apnea: no  Recent infection: Negative  Surgical complications: Negative    Surgical History  Past Surgical History:   Procedure Laterality Date     CHOLECYSTECTOMY  1995      ESOPHAGOGASTRODUODENOSCOPY N/A 12/21/2018    Procedure: ENDOSCOPIC ULTRASOUND FINE NEEDLE ASPIRATION;  Surgeon: Fidel Salter MD;  Location: [facility];  Service: Gastroenterology     JOINT REPLACEMENT Right     TKA     KNEE ARTHROSCOPY Bilateral        Medical History     Past Medical History:   Diagnosis Date     Allergic sinusitis      Norton esophagus 11/2014     BPH (benign prostatic hyperplasia)      Chronic kidney disease      GERD (gastroesophageal reflux disease)      Hypertension      Neuropathy      Osteoarthritis      Pancreatic cyst      Pancreatitis, recurrent      Patient Active Problem List    Diagnosis Date Noted     Pancreatic cyst 12/21/2018     CKD (chronic kidney disease) stage 3, GFR 30-59 ml/min (H) 06/21/2018     BPH with obstruction/lower urinary tract symptoms 12/09/2015     Hypertension      GERD (gastroesophageal reflux disease)      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        Family History  Family History   Problem Relation Age of Onset     Pancreatic cancer Mother      Heart attack Father      Breast cancer Sister      Liver disease Brother      Other Sister         head bleed after fall     Other Brother      Neuropathy Brother      Diabetes Brother      Diabetes Brother      Other Brother         WWII     No Medical Problems Son        Medications:  Current Outpatient Medications   Medication Sig Dispense Refill     cyanocobalamin, vitamin B-12, (B-12 COMPLIANCE) 1,000 mcg/mL Kit Inject 1 mL into the shoulder, thigh, or buttocks.       gabapentin (NEURONTIN) 100 MG capsule 90TAKE 1 CAPSULE (100 MGS) BY MOUTH AT BEDTIME. 90 capsule 3     gabapentin (NEURONTIN) 300 MG capsule Take 1 capsule (300 mg total) by mouth at bedtime. 90 capsule 3     glucosamine-chondroitin 500-400 mg cap Take 1 capsule by mouth 2 (two) times a day.       hydroCHLOROthiazide (MICROZIDE) 12.5 mg capsule Take 1 capsule (12.5 mg total) by mouth daily. 30 capsule 11     losartan (COZAAR) 50 MG tablet Take 1  "tablet (50 mg total) by mouth daily. 90 tablet 3     multivitamin with minerals (THERA-M) 9 mg iron-400 mcg Tab tablet Take 1 tablet by mouth daily.       traMADol (ULTRAM) 50 mg tablet Take 1 tablet (50 mg total) by mouth as needed for pain. 30 tablet 0     aspirin 81 MG EC tablet Take 81 mg by mouth daily.       losartan-hydrochlorothiazide (HYZAAR) 50-12.5 mg per tablet TAKE 1 TABLET BY MOUTH DAILY. 90 tablet 3     Current Facility-Administered Medications   Medication Dose Route Frequency Provider Last Rate Last Dose     [START ON 11/20/2019] cyanocobalamin injection 1,000 mcg  1,000 mcg Intramuscular Q30 Days Lalit Parada MD           Allergies:  Allergies   Allergen Reactions     Morphine      Causes upset stomach and pain;     Ciprofloxacin Other (See Comments)     Codeine        HABITS:   Social History     Tobacco Use     Smoking status: Never Smoker     Smokeless tobacco: Never Used   Substance Use Topics     Alcohol use: Yes     Comment: occasionally, beer     Drug use: No       Social History;  Social History     Patient does not qualify to have social determinant information on file (likely too young).   Social History Narrative    Kari 1969    Retired      [place]       Review of Systems:     Extensive 14-point review of systems was performed. Please see the HPI for problem specific pertinent review of systems.     Patient does note his appetite is good.  He is chronically constipated.  Urination unremarkable    Otherwise, the following systems are noncontributory including constitutional, eyes, ears, nose and throat, cardiovascular, respiratory, gastrointestinal, genitourinary, musculoskeletal,neurological, skin and/or breast, endocrine, hematologic/lymph, allergic/immunologic and psychiatric.           Objective:   /78   Pulse 69   Ht 5' 4.5\" (1.638 m)   Wt 199 lb (90.3 kg)   SpO2 98% Comment: RA  BMI 33.63 kg/m    Weight:   Wt Readings from Last 3 " Encounters:   10/18/19 199 lb (90.3 kg)   07/24/19 204 lb 1.9 oz (92.6 kg)   01/24/19 201 lb (91.2 kg)         General-appears well, no acute distress.  Skin-normal  Face symmetric  Speech normal  Throat normal  Good dentition  Neck without adenopathy  No carotid bruit upstroke normal  Lungs clear  Cardiac regular without murmur  Abdomen-well-healed scar  No palpable mass organomegaly or tenderness  Extremities no edema good pulses  Gait normal          Laboratory:      ECG, renal done today-pending at time of dictation.  Will be reviewed.  Will be on care everywhere/epic

## 2021-06-02 NOTE — TELEPHONE ENCOUNTER
Medication Question or Clarification  Who is calling: Pharmacy: [facility]  What medication are you calling about? (include dose and sig)   traMADol (ULTRAM) 50 mg tablet 30 tablet 0 10/18/2019  No   Sig - Route: Take 1 tablet (50 mg total) by mouth as needed for pain. - Oral       Who prescribed the medication?: Lalit Parada MD   What is your question/concern?: Please clarify sig - how many tablets should the patient be taking per day, what is the frequency?  Pharmacy: [place] [id number]  Okay to leave a detailed message?: Yes  Site CMT - Please call the pharmacy to obtain any additional needed information.

## 2021-06-02 NOTE — TELEPHONE ENCOUNTER
Medication Question or Clarification  Who is calling: Pharmacy: [facility][id number]  What medication are you calling about? (include dose and sig)    Disp Refills Start End    traMADol (ULTRAM) 50 mg tablet 30 tablet 0 10/18/2019     Sig - Route: Take 1 tablet (50 mg total) by mouth as needed for pain. - Oral    Sent to pharmacy as: traMADol 50 mg tablet (ULTRAM)    E-Prescribing Status: Receipt confirmed by pharmacy (10/18/2019 10:37 AM CDT)      Who prescribed the medication?: Lalit Parada MD   What is your question/concern?: Pharmacy message: Need clarification on sig, How many per day?  Pharmacy: [facility] #[id number]  Okay to leave a detailed message?: Yes, [phone]  Site CMT - Please call the pharmacy to obtain any additional needed information.

## 2021-06-03 VITALS — HEIGHT: 65 IN | BODY MASS INDEX: 34.01 KG/M2 | WEIGHT: 204.12 LBS

## 2021-06-03 VITALS
BODY MASS INDEX: 33.15 KG/M2 | HEIGHT: 65 IN | WEIGHT: 199 LBS | SYSTOLIC BLOOD PRESSURE: 122 MMHG | DIASTOLIC BLOOD PRESSURE: 78 MMHG | OXYGEN SATURATION: 98 % | HEART RATE: 69 BPM

## 2021-06-03 NOTE — ANESTHESIA CARE TRANSFER NOTE
Last vitals:   Vitals:    11/15/19 0637   BP: 131/68   Pulse: 64   Resp: 16   Temp: 36.7  C (98.1  F)   SpO2: 98%     Patient's level of consciousness is awake  Spontaneous respirations: yes  Maintains airway independently: yes  Dentition unchanged: yes  Oropharynx: oropharynx clear of all foreign objects    QCDR Measures:  ASA# 20 - Surgical Safety Checklist: WHO surgical safety checklist completed prior to induction    PQRS# 430 - Adult PONV Prevention: 4558F - Pt received => 2 anti-emetic agents (different classes) preop & intraop  ASA# 8 - Peds PONV Prevention: NA - Not pediatric patient, not GA or 2 or more risk factors NOT present  PQRS# 424 - Kimberly-op Temp Management: 4559F - At least one body temp DOCUMENTED => 35.5C or 95.9F within required timeframe  PQRS# 426 - PACU Transfer Protocol: - Transfer of care checklist used  ASA# 14 - Acute Post-op Pain: ASA14B - Patient did NOT experience pain >= 7 out of 10

## 2021-06-03 NOTE — ANESTHESIA PREPROCEDURE EVALUATION
Anesthesia Evaluation      No history of anesthetic complications     Airway   Mallampati: I   Pulmonary - negative ROS    breath sounds clear to auscultation                         Cardiovascular   (+) hypertension, ,     Rhythm: regular  Rate: normal,         Neuro/Psych    (+) neuromuscular disease (peripheral neuropathy),      Endo/Other - negative ROS      GI/Hepatic/Renal    (+) GERD, esophageal disease (Norton's esophagus),  chronic renal disease CRI,     Comments: Pancreatic cyst, under US surveillance, shrinking per last endoscopic US in 2018, s/f repeat US today           Dental - normal exam                        Anesthesia Plan  Planned anesthetic: MAC  Plan for propofol gtt w/ ketamine / fentanyl PRN for pain.  Discussed potential need to convert to GA w/ ETT vs LMA if not tolerating.  Explained that it is possible to remember certain aspects of the OR experience during MAC dependent on the depth of sedation and patient understands this.  Decadron and zofran for PONV ppx.    ASA 2     Anesthetic plan and risks discussed with: patient  Anesthesia plan special considerations: antiemetics,   Post-op plan: routine recovery

## 2021-06-03 NOTE — ANESTHESIA POSTPROCEDURE EVALUATION
Patient: Mickey Desouza  ENDOSCOPIC ULTRASOUND WITH SMALL BOWEL AND GASTRIC BIOPSY  Anesthesia type: MAC    Patient location: Phase II Recovery  Last vitals:   Vitals Value Taken Time   /76 11/15/2019  9:15 AM   Temp 36.5  C (97.7  F) 11/15/2019  8:48 AM   Pulse 64 11/15/2019  9:20 AM   Resp 13 11/15/2019  8:53 AM   SpO2 97 % 11/15/2019  9:20 AM   Vitals shown include unvalidated device data.  Post vital signs: stable  Level of consciousness: awake and responds to simple questions  Post-anesthesia pain: pain controlled  Post-anesthesia nausea and vomiting: no  Pulmonary: unassisted, return to baseline  Cardiovascular: stable and blood pressure at baseline  Hydration: adequate  Anesthetic events: no    QCDR Measures:  ASA# 11 - Kimberly-op Cardiac Arrest: ASA11B - Patient did NOT experience unanticipated cardiac arrest  ASA# 12 - Kimberly-op Mortality Rate: ASA12B - Patient did NOT die  ASA# 13 - PACU Re-Intubation Rate: NA - No ETT / LMA used for case  ASA# 10 - Composite Anes Safety: ASA10A - No serious adverse event    Additional Notes:

## 2021-06-04 VITALS — WEIGHT: 199 LBS | BODY MASS INDEX: 33.63 KG/M2

## 2021-06-04 NOTE — TELEPHONE ENCOUNTER
Controlled Substance Refill Request  Medication:   Requested Prescriptions     Pending Prescriptions Disp Refills     traMADol (ULTRAM) 50 mg tablet [Pharmacy Med Name: TRAMADOL HCL 50 MG TABLET] 30 tablet 0     Sig: TAKE 1 TABLET (50 MG TOTAL) BY MOUTH 2 (TWO) TIMES A DAY AS NEEDED FOR PAIN.     Date Last Fill: 10/23/19  [facility]   Submit electronically to pharmacy  Controlled Substance Agreement on File:   Encounter-Level CSA Scan Date:    There are no encounter-level csa scan date.       Last office visit: Last office visit pertaining to requested medication was 10/18/19.

## 2021-06-05 VITALS
HEART RATE: 86 BPM | HEIGHT: 65 IN | BODY MASS INDEX: 33.82 KG/M2 | OXYGEN SATURATION: 95 % | DIASTOLIC BLOOD PRESSURE: 70 MMHG | WEIGHT: 203 LBS | SYSTOLIC BLOOD PRESSURE: 124 MMHG

## 2021-06-05 VITALS
OXYGEN SATURATION: 94 % | DIASTOLIC BLOOD PRESSURE: 64 MMHG | BODY MASS INDEX: 33.15 KG/M2 | SYSTOLIC BLOOD PRESSURE: 126 MMHG | WEIGHT: 199 LBS | HEIGHT: 65 IN | HEART RATE: 76 BPM

## 2021-06-07 NOTE — TELEPHONE ENCOUNTER
B12 injection appointment scheduled at [facility] today. Spoke with patients wife. Informed to postpone B12 injection at this time due to COVID-19.   Notified I will send message to Dr Parada for further instructions on how he would like to proceed for future injections.   Appointment for today has been cancelled.

## 2021-06-07 NOTE — TELEPHONE ENCOUNTER
New Appointment Needed  What is the reason for the visit:    B12- Pt stated he has these injections monthly and needs a rescheduled appt before July pt is requesting a call back please advise  Provider Preference: PCP only  How soon do you need to be seen?: 04/04  Waitlist offered?: No  Okay to leave a detailed message:  Yes

## 2021-06-13 NOTE — PROGRESS NOTES
OFFICE VISIT NOTE  Mickey Desouza   72 y.o. male            Assessment/Plan for  Mickey Desouza is a 72 y.o. male.  No Patient Care Coordination Note on file.       1. Left shoulder pain  Probable tendinitis/rotator cuff issue.  Has appointment with orthopedist Tuesday.       2.  Hypertension controlled with normal cardiovascular examination and no cardiovascular symptomatology.       Plan:  Reassurance not heart  Continue current meds  Orthopedic visit Tuesday    There are no Patient Instructions on file for this visit.    Diagnoses and all orders for this visit:    Left shoulder pain        Medications after visit  Current Outpatient Prescriptions   Medication Sig Dispense Refill     aspirin 81 MG EC tablet Take 81 mg by mouth daily.       cyanocobalamin, vitamin B-12, 1,000 mcg/mL Kit Inject 1,000 mcg as directed every 30 (thirty) days. 12 kit 1     diclofenac (VOLTAREN) 75 MG EC tablet TAKE 1 TABLET TWICE DAILY  1     gabapentin (NEURONTIN) 300 MG capsule Take 300 mg by mouth daily.       glucosamine-chondroitin 500-400 mg cap Take 1 capsule by mouth 2 (two) times a day.       L.ACIDOPH/B.LONG/L.PLANT/B.LAC (PROBIOTIC ACIDOPHILUS BEADS ORAL) Take by mouth daily. 2 tabs twice daily        losartan-hydrochlorothiazide (HYZAAR) 50-12.5 mg per tablet TAKE 1 TABLET BY MOUTH DAILY. 90 tablet 0     multivitamin with minerals (THERA-M) 9 mg iron-400 mcg Tab tablet Take 1 tablet by mouth daily.       omeprazole (PRILOSEC) 20 MG capsule Take 20 mg by mouth daily.       traMADol (ULTRAM) 50 mg tablet Take 50 mg by mouth as needed for pain.       amoxicillin (AMOXIL) 500 MG capsule TAKE 4 CAPSULES BY MOUTH ONE HOUR BEFORE DENTAL APPOINTMENT  6     Current Facility-Administered Medications   Medication Dose Route Frequency Provider Last Rate Last Dose     cyanocobalamin injection 1,000 mcg  1,000 mcg Intramuscular Q30 Days Lalit Parada MD   1,000 mcg at 09/12/17 0850             Physician        Lalit Parada,  MD  Internal medicine  [facility]  [phone]  [email or web address]      This is an electronically verified report by Lalit Parada M.D.  (Note created with Dragon voice recognition and unintended spelling errors and word substitutions may occur)               Subjective:   Chief Complaint:  Shoulder Pain (Left sided. X 6wks); Medication Questions (B12 and gabapentin); and Peripheral Neuropathy    Positional left shoulder discomfort  Present 3-6 weeks  Has had before  Lifted lawnmower 6 weeks ago.    Primarily mid humeral.  Painful getting dressed.  Sometimes awakening at night.  Nonexertional.  Clearly positional.    Patient is concerned/wife is concerned that this could be heart related.  No exertional symptomatology.  No dyspnea.  No chest pain.  Hypertension-There are no cardiovascular, respiratory, neurologic complaints.No claudication.There is no orthostasis.Patient is compliant with medications.  Medications reviewed.   No side effects from medication.    Review of Systems:     Extensive 10-point review of systems was performed. Please see the HPI for problem specific pertinent review of systems.     Patient does note he feels well if he does not move his arm    Otherwise, the following systems are noncontributory including constitutional, eyes, ears, nose and throat, cardiovascular, respiratory, gastrointestinal, genitourinary, musculoskeletal,neurological, skin and/or breast, endocrine, hematologic/lymph, allergic/immunologic and psychiatric.              Medications:  Current Outpatient Prescriptions on File Prior to Visit   Medication Sig     cyanocobalamin, vitamin B-12, 1,000 mcg/mL Kit Inject 1,000 mcg as directed every 30 (thirty) days.     L.ACIDOPH/B.LONG/L.PLANT/B.LAC (PROBIOTIC ACIDOPHILUS BEADS ORAL) Take by mouth daily. 2 tabs twice daily      losartan-hydrochlorothiazide (HYZAAR) 50-12.5 mg per tablet TAKE 1 TABLET BY MOUTH DAILY.     multivitamin with  "minerals (THERA-M) 9 mg iron-400 mcg Tab tablet Take 1 tablet by mouth daily.     omeprazole (PRILOSEC) 20 MG capsule Take 20 mg by mouth daily.     amoxicillin (AMOXIL) 500 MG capsule TAKE 4 CAPSULES BY MOUTH ONE HOUR BEFORE DENTAL APPOINTMENT     [DISCONTINUED] cyanocobalamin, vitamin B-12, 1,000 mcg/mL Kit Inject 1,000 mcg as directed every 30 (thirty) days.     Current Facility-Administered Medications on File Prior to Visit   Medication     cyanocobalamin injection 1,000 mcg            Allergies:  Allergies   Allergen Reactions     Morphine      Causes upset stomach and pain;       PSFHx: Tobacco Status:  He  reports that he has never smoked. He has never used smokeless tobacco.   Alcohol Status:    History   Alcohol Use     Yes     Comment: occasionally, beer       reports that he has never smoked. He has never used smokeless tobacco. He reports that he drinks alcohol. His drug history is not on file.    Objective:    /82 (Patient Site: Left Arm, Patient Position: Sitting, Cuff Size: Adult Regular)  Pulse 70  Ht 5' 5\" (1.651 m)  Wt 202 lb 1.3 oz (91.7 kg)  SpO2 98%  BMI 33.63 kg/m2  Weight:   Wt Readings from Last 3 Encounters:   10/05/17 202 lb 1.3 oz (91.7 kg)   12/15/16 203 lb 0.6 oz (92.1 kg)   09/29/16 204 lb 1.3 oz (92.6 kg)     BP Readings from Last 3 Encounters:   10/05/17 124/82   12/15/16 136/80   09/29/16 128/74         General-appears well, no acute distress.  Patient has tenderness on elevation, internal rotation, and adduction  Shoulder no liane tenderness  Chest wall no tenderness  Lungs clear  Cardiac regular without murmur  No edema  Color good      Review of clinical lab tests  Lab Results   Component Value Date    HGB 13.3 (L) 06/13/2009     12/15/2016    K 4.2 12/15/2016     12/15/2016    CREATININE 1.39 (H) 12/15/2016    BUN 24 12/15/2016    CO2 31 12/15/2016    PSA 1.0 03/15/2016    INR 1.31 (H) 06/14/2009    HGBA1C 6.4 (H) 12/15/2016       Glucose   Date/Time Value " Ref Range Status   12/15/2016 08:27  74 - 125 mg/dL Final   09/29/2016 09:06  74 - 125 mg/dL Final   03/15/2016 09:05 AM 84 70 - 125 mg/dL Final   11/19/2015 01:50  74 - 125 mg/dL Final     No results found for this or any previous visit (from the past 24 hour(s)).    RADIOLOGY: No results found.    Review of recent consultation-K

## 2021-06-13 NOTE — PROGRESS NOTES
Assessment and Plan:   1. Routine general medical examination at a health care facility  This is a 75-year-old man with issues as discussed below    2. Essential hypertension  Blood pressure okay but sodium low, if remains low consider stopping hydrochlorothiazide and increasing losartan  - losartan-hydrochlorothiazide (HYZAAR) 50-12.5 mg per tablet; Take 1 tablet by mouth daily.  Dispense: 90 tablet; Refill: 3  - Comprehensive Metabolic Panel  - Thyroid Cascade    3. Lumbar and sacral arthritis  He is on occasional tramadol, 30 tablets last about 6 months  - traMADoL (ULTRAM) 50 mg tablet; Take 1 tablet (50 mg total) by mouth 2 (two) times a day as needed for pain.  Dispense: 30 tablet; Refill: 5    4. Norton's esophagus with dysplasia  Continue with follow-up with [facility]    5. Stage 3a chronic kidney disease  Continue losartan for renal protection avoid nephrotoxic medication      7. Pernicious anemia  Arcelia oral vitamin B12 replacement and he would like to continue with injections  - cyanocobalamin injection 1,000 mcg  - Vitamin B12  - HM2(CBC w/o Differential)  - Folate, Serum  - Ferritin    8. Screening for hyperlipidemia  - Lipid Cascade    9. Screening for prostate cancer  - PSA (Prostatic-Specific Antigen), Annual Screen    10. BPH with obstruction/lower urinary tract symptoms  Trial of Flomax  - tamsulosin (FLOMAX) 0.4 mg cap; Take 1 capsule (0.4 mg total) by mouth daily after supper.  Dispense: 90 capsule; Refill: 3    The patient's current medical problems were reviewed.    The following health maintenance schedule was reviewed with the patient and provided in printed form in the after visit summary:   Health Maintenance   Topic Date Due     ZOSTER VACCINES (1 of 2) 05/20/1994     Pneumococcal Vaccine: 65+ Years (1 of 1 - PPSV23) 05/20/2009     INFLUENZA VACCINE RULE BASED (1) 08/01/2020     TD 18+ HE  08/17/2021     MEDICARE ANNUAL WELLNESS VISIT  11/20/2021     FALL RISK ASSESSMENT  11/20/2021      LIPID  11/20/2025     ADVANCE CARE PLANNING  11/20/2025     HEPATITIS C SCREENING  Completed     Pneumococcal Vaccine: Pediatrics (0 to 5 Years) and At-Risk Patients (6 to 64 Years)  Aged Out     HEPATITIS B VACCINES  Aged Out        Subjective:   Chief Complaint: Mickey Desouza is an 75 y.o. male here for an Annual Wellness visit.   HPI: This 75-year-old man comes in to establish care.  Annual physical, review of chronic medical problems.  Overall doing okay.  He has known Norton's esophagus and follows with GI.  Recent endoscopy reviewed.  He has been having some more urinary frequency and nocturia.  He has some chronic low back pain.  Occasionally use tramadol, 30 pills last about 6 months.  History of pernicious anemia on B12 injections.  Has had some low sodium and has chronic kidney disease.  Underlying hypertension on losartan and hydrochlorothiazide.    Review of Systems:   Please see above.  The rest of the review of systems are negative for all systems.    Patient Care Team:  Lalit Parada MD as PCP - General (Internal Medicine)  Jf Lee MD as Assigned PCP     Patient Active Problem List   Diagnosis     Norton's esophagus with dysplasia     Essential hypertension with goal blood pressure less than 140/90     Gastroesophageal reflux disease with esophagitis without hemorrhage     BPH with obstruction/lower urinary tract symptoms     Stage 3a chronic kidney disease     Pancreatic cyst     Past Medical History:   Diagnosis Date     Allergic sinusitis      Norton esophagus 11/2014     BPH (benign prostatic hyperplasia)      Chronic kidney disease     Stage 3     GERD (gastroesophageal reflux disease)      Hypertension      Neuropathy      Osteoarthritis      Pancreatic cyst      Pancreatitis, recurrent       Past Surgical History:   Procedure Laterality Date     CARPAL TUNNEL RELEASE Bilateral      CATARACT EXTRACTION Bilateral      CHOLECYSTECTOMY  1995      ESOPHAGOGASTRODUODENOSCOPY N/A 12/21/2018    Procedure: ENDOSCOPIC ULTRASOUND FINE NEEDLE ASPIRATION;  Surgeon: Fidel Salter MD;  Location: [facility];  Service: Gastroenterology     ESOPHAGOGASTRODUODENOSCOPY N/A 11/15/2019    Procedure: ENDOSCOPIC ULTRASOUND WITH SMALL BOWEL AND GASTRIC BIOPSY;  Surgeon: Fidel Salter MD;  Location: [facility];  Service: Gastroenterology     JOINT REPLACEMENT Right     TKA     KNEE ARTHROSCOPY Bilateral      TRIGGER FINGER RELEASE Bilateral       Family History   Problem Relation Age of Onset     Pancreatic cancer Mother      Heart attack Father      Breast cancer Sister      Liver disease Brother      Other Sister         head bleed after fall     Other Brother      Neuropathy Brother      Diabetes Brother      Diabetes Brother      Other Brother         WWII     No Medical Problems Son       Social History     Socioeconomic History     Marital status:      Spouse name: Not on file     Number of children: Not on file     Years of education: Not on file     Highest education level: Not on file   Occupational History     Not on file   Social Needs     Financial resource strain: Not on file     Food insecurity     Worry: Not on file     Inability: Not on file     Transportation needs     Medical: Not on file     Non-medical: Not on file   Tobacco Use     Smoking status: Never Smoker     Smokeless tobacco: Never Used   Substance and Sexual Activity     Alcohol use: Not Currently     Comment: occasionally, beer     Drug use: No     Sexual activity: Not on file   Lifestyle     Physical activity     Days per week: Not on file     Minutes per session: Not on file     Stress: Not on file   Relationships     Social connections     Talks on phone: Not on file     Gets together: Not on file     Attends Denominational service: Not on file     Active member of club or organization: Not on file     Attends meetings of clubs or organizations: Not on file     Relationship  "status: Not on file     Intimate partner violence     Fear of current or ex partner: Not on file     Emotionally abused: Not on file     Physically abused: Not on file     Forced sexual activity: Not on file   Other Topics Concern     Not on file   Social History Narrative    Wife, Kari Perez, retired      Son- Arnold (adopted)      Current Outpatient Medications   Medication Sig Dispense Refill     gabapentin (NEURONTIN) 300 MG capsule Take 1 capsule (300 mg total) by mouth at bedtime. 90 capsule 3     glucosamine-chondroitin 500-400 mg cap Take 1 capsule by mouth 2 (two) times a day.       losartan-hydrochlorothiazide (HYZAAR) 50-12.5 mg per tablet Take 1 tablet by mouth daily. 90 tablet 3     multivitamin with minerals (THERA-M) 9 mg iron-400 mcg Tab tablet Take 1 tablet by mouth daily.       omeprazole (PRILOSEC) 20 MG capsule Take 40 mg by mouth daily before breakfast.       traMADoL (ULTRAM) 50 mg tablet Take 1 tablet (50 mg total) by mouth 2 (two) times a day as needed for pain. 30 tablet 5     tamsulosin (FLOMAX) 0.4 mg cap Take 1 capsule (0.4 mg total) by mouth daily after supper. 90 capsule 3     Current Facility-Administered Medications   Medication Dose Route Frequency Provider Last Rate Last Admin     [START ON 11/21/2020] cyanocobalamin injection 1,000 mcg  1,000 mcg Intramuscular Q30 Days Jf Lee MD          Objective:   Vital Signs:   Visit Vitals  /64 (Patient Site: Right Arm, Patient Position: Sitting, Cuff Size: Adult Regular)   Pulse 76   Ht 5' 4.5\" (1.638 m)   Wt 199 lb (90.3 kg)   SpO2 94%   BMI 33.63 kg/m           VisionScreening:  No exam data present     PHYSICAL EXAM  EYES: Eyelids, conjunctiva, and sclera were normal. Pupils were normal. Cornea, iris, and lens were normal bilaterally.  HEAD, EARS, NOSE, MOUTH, AND THROAT: Head and face were normal. Hearing was normal to voice and the ears were normal to external exam. Nose appearance was normal and there was " no discharge. Oropharynx was normal.  NECK: Neck appearance was normal. There were no neck masses and the thyroid was not enlarged.  RESPIRATORY: Breathing pattern was normal and the chest moved symmetrically.  Percussion/auscultatory percussion was normal.  Lung sounds were normal and there were no abnormal sounds.  CARDIOVASCULAR: Heart rate and rhythm were normal.  S1 and S2 were normal and there were no extra sounds or murmurs. Peripheral pulses in arms and legs were normal.  Jugular venous pressure was normal.  There was no peripheral edema.  GASTROINTESTINAL: The abdomen was normal in contour.  Bowel sounds were present.  Percussion detected no organ enlargement or tenderness.  Palpation detected no tenderness, mass, or enlarged organs.   MUSCULOSKELETAL: Skeletal configuration was normal and muscle mass was normal for age. Joint appearance was overall normal.  LYMPHATIC: There were no enlarged nodes.  SKIN/HAIR/NAILS: Skin color was normal.  There were no skin lesions.  Hair and nails were normal.  NEUROLOGIC: The patient was alert and oriented to person, place, time, and circumstance. Speech was normal. Cranial nerves were normal. Motor strength was normal for age. The patient was normally coordinated.  PSYCHIATRIC:  Mood and affect were normal and the patient had normal recent and remote memory. The patient's judgment and insight were normal.      Assessment Results 11/20/2020   Activities of Daily Living No help needed   Instrumental Activities of Daily Living No help needed   Get Up and Go Score Less than 12 seconds   Mini Cog Total Score 5   Some recent data might be hidden     A Mini-Cog score of 0-2 suggests the possibility of dementia, score of 3-5 suggests no dementia      Identified Health Risks:     He is at risk for lack of exercise and has been provided with information to increase physical activity for the benefit of his well-being.  Information on urinary incontinence and treatment options  given to patient.  Information regarding advance directives (living harmon), including where he can download the appropriate form, was provided to the patient via the AVS.

## 2021-06-16 NOTE — PROGRESS NOTES
Office Visit - Follow Up   Mickey Desouza   75 y.o. male    Date of Visit: 4/21/2021    Chief Complaint   Patient presents with     Establish Care     Lightheaded, pressure behind eyes off and on         -------------------------------------------------------------------------------------------------------------------------  Assessment and Plan    Establish care visit for this 75-year-old man, previously patient Dr. Sorenson, who is a retired  and .  He has enjoyed generally good health over the last year, but did have a symptom to report today:    Brief 1 to 2-second episodes of blurry vision, lightheadedness, pressure behind eyes, which I wonder whether this might be symptoms of sinus congestion and irritation; long history of seasonal allergic rhinitis    The symptoms have been going on since approximately February.  Frequency seems to have have increased, and is now experiencing these on most days, maybe twice a day, again lasting only 1 to 2 seconds.  We are now approaching the height of allergy season.    He has a long history of seasonal nasal allergies and sinus congestion problems.  I told him that the ethmoid sinuses are actually just behind the eyes, and perhaps his symptoms could be sinus trouble.  I am quite convinced that there is no concern here for stroke.  He did see his ophthalmologist twice, and was told that there is not an ocular explanation for the symptom.    He told me that he is using a or over-the-counter antihistamine tablet.  I told him that he could intensify his allergy treatment by using over-the-counter nasal steroid spray, for example Flonase (fluticasone) or Nasonex (mometasone).  The spray can be used every day during allergy season.    Peripheral neuropathy with numbness in his feet, also restless leg syndrome, has been working with neurologist Dr. Gallegos, and is currently on gabapentin 300 mg taken twice a day.  He has undergone EMG which demonstrated  neuropathy.  Laboratory work did not identify an underlying cause.  He experiences pins-and-needles dysesthesias.  Ferritin level was normal.  Past trial of Cymbalta did not produce much relief.  He does find gabapentin helpful and has found a dose that seems to give him adequate relief at 300 mg twice a day.    Essential hypertension, blood pressure nicely controlled on combination of losartan 50 mg a day with hydrochlorothiazide 12.5 mg a day.  Goal blood pressure is 120/80.    Chronic kidney disease stage IIIa with GFR of 51 and creatinine of approximately 1.35 most recently measured November 20, 2020.  The losartan component of his blood pressure medicine will help protect his kidneys for the long run.  There are certain medications he needs to avoid because of his kidneys, most notably the nonsteroidal anti-inflammatory drug such as ibuprofen and naproxen.  Tylenol (acetaminophen) is okay to use.    Benign prostatic hyperplasia with urinary frequency, Flomax was ineffective, PSA was quite satisfactory only 1.0 measured November 20, 2020.  He told me that he empties his bladder completely, and that is the most important thing.    Prediabetes in the context of obesity, A1c was 6.0 when measured April 2018.  When he comes back for routine lab work later this autumn, will recheck his A1c.    Obesity with body mass index of 34.3.  I would like to see him lose 20 pounds this year.  I reminded him about the importance of eating slower, controlling portion size, and identifying problem foods to curtail or eliminate, especially starches, sweets, fried foods.    Lumbar spinal stenosis, for which has been getting epidural steroid injections approximately twice a year.  Currently working with [facility] orthopedics.  I told him that losing weight would help his back.  He also needs to do his home exercises to keep his core muscles strong and encouraged good posture.  He told me that he might use 1 or 2 tramadol tablets a  week for his back.  I reminded him to minimize the use of the tramadol, because it can contribute to constipation.  I will renew his prescription today for 30 tablets.    Chronic neck pain.  Similar to his back, he needs to work on strengthening her upper back muscles also deep neck muscles, sleep on a properly supportive pillow, and maintain mobility and flexibility.    History of Norton's esophagus negative for dysplasia on most recent endoscopy October 28, 2020.  Recheck 3 years after that which would be October 2023.  Maintained on omeprazole.    History of pernicious anemia taking oral vitamin B12, with normal blood cell counts November 20, 2020.  Normal vitamin B12 level as well.    History of recurrent bouts of pancreatitis 1989, 1990, 96, 2008, with a 1.1 x 1.9 cm pancreatic cyst most recently imaged by CT October 19, 2018, stable in size.  He told me that alcohol consumption is 0.    Status post right total knee arthroplasty, initially 2004, redo in 2009, now doing well.    Status post cataract surgery both eyes 2018, dry eye syndrome recently prescribed Restasis drops.    Received his second shot of Pfizer Covid vaccine March 5, 2021.    Constipation, he told me that he might go 2 to 3 days without a bowel movement.  I reminded him to minimize the tramadol opioid.  He asked about Linzess, but I think he needs to first go with the first line management which would be MiraLAX (polyethylene glycol powder).  I told him that he could take 1 capful of MiraLAX powder a day, diluted into his favorite beverage.    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      --------------------------------------------------------------------------------------------------------------------------  History of Present Illness  This 75 y.o. old     Establish care visit for this 75-year-old man, previously patient Dr. Sorenson,  who is a retired  and .  He has enjoyed generally good health over the last year, but did have a symptom to report today:    Brief 1 to 2-second episodes of blurry vision, lightheadedness, pressure behind eyes, which I wonder whether this might be symptoms of sinus congestion and irritation; long history of seasonal allergic rhinitis    The symptoms have been going on since approximately February.  Frequency seems to have have increased, and is now experiencing these on most days, maybe twice a day, again lasting only 1 to 2 seconds.  We are now approaching the height of allergy season.    He has a long history of seasonal nasal allergies and sinus congestion problems.  I told him that the ethmoid sinuses are actually just behind the eyes, and perhaps his symptoms could be sinus trouble.  I am quite convinced that there is no concern here for stroke.  He did see his ophthalmologist twice, and was told that there is not an ocular explanation for the symptom.    He told me that he is using a or over-the-counter antihistamine tablet.  I told him that he could intensify his allergy treatment by using over-the-counter nasal steroid spray, for example Flonase (fluticasone) or Nasonex (mometasone).  The spray can be used every day during allergy season.      some ongoing dizziness and some upper arm achiness at times.      Restless legs syndrome   Neuropathy   Neuropathic pain     03/23/2021 Office Visit [facility] [address] [id number] [phone]   Kulwinder Adame MD    [phone] (Fax)   Neuropathic pain (Primary Dx);   Neuropathy;   Restless leg syndrome    seen for numbness in the feet and neuropathic pain.   EMG had shown a neuropathy. Blood work was negative for causes of neuropathy. He has pins-and-needles and pain for which he is using gabapentin which she does find benefit. He also reports some sensation to move his legs  to get comfortable at nighttime. Ferritin came back normal. In the past we had tried Cymbalta which she only tried for little bit before his primary switched him back to the gabapentin. He has also been prescribed Lyrica in the past but could not afford it because of cost. Pain starts coming on in the evening time. Reports that sometimes the pain wakes him up in the nighttime.    EMG  CLINICAL INTERPRETATION:  This is a abnormal nerve conduction and EMG study of both lower extremities. The study is consistent with a peripheral neuropathy. Further clinical correlation is needed.     Currently is taking 300 mg of gabapentin around 7 PM and then 300 mg at 9 PM. This is the best combination is found.     Denies any side effects to the gabapentin. Previously was having some neck pain which has improved. He also has spinal stenosis and has been working with his doctor regarding spinal stenosis. He further reports issues with knee pain and has been getting some injections for that. Has been exercising 3 times a week. This is for 45 minutes.      Cancer (H) Squanmous cell, left cheek , XRT)    Hypertension 3/23/2021   - losartan-hydrochlorothiazide (HYZAAR) 50-12.5 mg per tablet; Take 1 tablet by mouth daily.    BP Readings from Last 3 Encounters:   04/21/21 124/70   11/20/20 126/64   11/15/19 120/65     Past smoking, quit in his 30's  Lab Results   Component Value Date    CREATININE 1.35 (H) 11/20/2020    BUN 16 11/20/2020     11/20/2020    K 4.1 11/20/2020     11/20/2020    CO2 27 11/20/2020       Lab Results   Component Value Date    CHOL 157 11/20/2020     Lab Results   Component Value Date    HDL 44 11/20/2020     Lab Results   Component Value Date    LDLCALC 86 11/20/2020     Lab Results   Component Value Date    TRIG 133 11/20/2020     No components found for: CHOLHDL    Stage 3a chronic kidney disease   GFR 51  Continue losartan for renal protection avoid nephrotoxic medication    Urinary frequency   BPH  with obstruction/lower urinary tract symptoms  Trial of Flomax-- ineffecteive    Lab Results   Component Value Date    PSA 1.0 11/20/2020    PSA 1.0 03/15/2016   Empties OK    Prediabetes  Lab Results   Component Value Date    HGBA1C 6.0 04/25/2018     Neck pain, chronic     Allergic rhinitis     Hx of carpal tunnel surgery bilaterally    Constipation  May go 2-3 days  Tramadol 2 tabs a week    Lumbar stenosis  Gets injections, 2x/year at [facility]    He is on occasional tramadol, 30 tablets last about 6 months  - traMADoL (ULTRAM) 50 mg tablet; Take 1 tablet (50 mg total) by mouth 2 (two) times a day as needed for pain.  Dispense: 30 tablet; Refill: 5     Norton's esophagus with dysplasia  Upper endoscopy 10/28/2020, specialized Norton's mucosa, negative for dysplasia  Recheck in 3 years which would be October 2023    Pernicious anemia  Arcelia oral vitamin B12 replacement and he would like to continue with injections  Lab Results   Component Value Date    WBC 6.7 11/20/2020    HGB 14.5 11/20/2020    HCT 42.5 11/20/2020    MCV 94 11/20/2020     11/20/2020     Lab Results   Component Value Date    JSCRRSXS32 1,017 (H) 11/20/2020     Pancreatic cyst    Pancreatitis, 1989, 1990, February 1996, in March 2008.    10-  CT   Stable 1.1 x 1.9 cm pancreatic cyst along the anterior pancreatic body (series 4, image 102). Stable pancreatic tail atrophy. Cholecystectomy. Stable 2 x 3 mm right renal lower pole nonobstructing calculus. Renal cysts. Unremarkable liver,   spleen and adrenals. Nonaneurysmal aorta with mild atherosclerosis. No free air or adenopathy.    IMPRESSION:   CONCLUSION:  1.  Stable tiny nonobstructing right renal calculus. No other renal or ureteral calculi. No hydronephrosis.  2.  Stable pancreatic cyst.  3.  No bowel obstruction. No diverticulitis. No appendicitis.  4.  No free air or collection.     JOINT REPLACEMENT Right    TKA  #1 [place] 2004  Needed redo 2009    Obesity  Wt Readings from Last 3  Encounters:   04/21/21 203 lb (92.1 kg)   11/20/20 199 lb (90.3 kg)   11/12/19 199 lb (90.3 kg)     Seasonal allergies: OTC antihistmin    Cataract surgery both eyes 2018  Dry eys  cycloSPORINE (RESTASIS) 0.05 % ophthalmic emulsion    Immunization History   Administered Date(s) Administered     COVID-19,PF,Pfizer 02/12/2021, 03/05/2021     Td, Adult, Absorbed 01/01/2006     Tdap 08/17/2011       Wt Readings from Last 3 Encounters:   04/21/21 203 lb (92.1 kg)   11/20/20 199 lb (90.3 kg)   11/12/19 199 lb (90.3 kg)     BP Readings from Last 3 Encounters:   04/21/21 124/70   11/20/20 126/64   11/15/19 120/65       Lab Results   Component Value Date    WBC 6.7 11/20/2020    HGB 14.5 11/20/2020    HCT 42.5 11/20/2020     11/20/2020    CHOL 157 11/20/2020    TRIG 133 11/20/2020    HDL 44 11/20/2020    ALT 17 11/20/2020    AST 17 11/20/2020     11/20/2020    K 4.1 11/20/2020     11/20/2020    CREATININE 1.35 (H) 11/20/2020    BUN 16 11/20/2020    CO2 27 11/20/2020    TSH 1.39 11/20/2020    PSA 1.0 11/20/2020    INR 1.31 (H) 06/14/2009    HGBA1C 6.0 04/25/2018        Review of Systems: A comprehensive review of systems was negative except as noted.  ---------------------------------------------------------------------------------------------------------------------------    Medications, Allergies, Social, and Problem List   Current Outpatient Medications   Medication Sig Dispense Refill     aspirin 81 MG EC tablet Take 81 mg by mouth daily.       cycloSPORINE (RESTASIS) 0.05 % ophthalmic emulsion        gabapentin (NEURONTIN) 300 MG capsule Take 1 capsule (300 mg total) by mouth at bedtime. 90 capsule 3     glucosamine-chondroitin 500-400 mg cap Take 1 capsule by mouth 2 (two) times a day.       losartan-hydrochlorothiazide (HYZAAR) 50-12.5 mg per tablet Take 1 tablet by mouth daily. 90 tablet 3     multivitamin with minerals (THERA-M) 9 mg iron-400 mcg Tab tablet Take 1 tablet by mouth daily.        "omeprazole (PRILOSEC) 20 MG capsule Take 40 mg by mouth daily before breakfast.       tamsulosin (FLOMAX) 0.4 mg cap Take 1 capsule (0.4 mg total) by mouth daily after supper. 90 capsule 3     traMADoL (ULTRAM) 50 mg tablet Take 1 tablet (50 mg total) by mouth 2 (two) times a day as needed for pain. 30 tablet 5     Current Facility-Administered Medications   Medication Dose Route Frequency Provider Last Rate Last Admin     cyanocobalamin injection 1,000 mcg  1,000 mcg Intramuscular Q30 Days Jf Lee MD   1,000 mcg at 04/06/21 0844     Allergies   Allergen Reactions     Morphine      Causes upset stomach and pain     Ciprofloxacin Other (See Comments)     Codeine Other (See Comments)     Abdominal pain     Social History     Tobacco Use     Smoking status: Never Smoker     Smokeless tobacco: Never Used   Substance Use Topics     Alcohol use: Not Currently     Comment: occasionally, beer     Drug use: No     Patient Active Problem List   Diagnosis     Norton's esophagus with dysplasia     Essential hypertension with goal blood pressure less than 140/90     Gastroesophageal reflux disease with esophagitis without hemorrhage     BPH with obstruction/lower urinary tract symptoms     Stage 3a chronic kidney disease     Pancreatic cyst        Reviewed, reconciled and updated       Physical Exam   General Appearance:   Very pleasant, good historian, breathing comfortably, is overweight, blood pressure looks great however.    /70 (Patient Site: Right Arm, Patient Position: Sitting)   Pulse 86   Ht 5' 4.5\" (1.638 m)   Wt 203 lb (92.1 kg)   SpO2 95%   BMI 34.31 kg/m      Oropharynx clear  Ears clear  No cervical adenopathy  Lungs clear  Heart regular rate and rhythm no murmur  Abdomen obese, nontender  Extremities no edema  He is able to rise easily from seated to standing, but he walks rather stiffly because of his chronic back pain and lumbar spinal stenosis.     Additional Information   I spent 40 " minutes on this encounter, including reviewing interval history since last visit, examining the patient, explaining and counseling the issues enumerated in the Assessment and Plan (patient given a copy)       Be Maria MD

## 2021-06-16 NOTE — TELEPHONE ENCOUNTER
Telephone Encounter by Christina Aguilar CMA at 5/7/2019  2:53 PM     Author: Christina Aguilar CMA Service: -- Author Type: Medical Assistant    Filed: 5/7/2019  2:59 PM Encounter Date: 5/7/2019 Status: Signed    : Christina Aguilar CMA (Medical Assistant)       Spoke with the patient's wife, Lucy Mcfarland, and relayed the following message from Dr. Parada:  Lalit Parada MD  You 1 hour ago (1:10 PM)      Do other pharmacies have available   If so send in there     Lalit Parada MD   Internal medicine   [facility]   [phone]   [email or web address]    Routing comment      She verbalized understanding, but wanted to know which [facility] pharmacies had the prescription available.      Contacted [facility] and the only 2 pharmacies in the [facility] area that had the prescription in stock were on [address] in [place] and inside [facility] in [place].  Relayed that information to the patient's wife who states that neither of those locations are near them.    She states that the patient has an appointment with Dr. Parada on 7/24/19 and currently has 75 tablets left.     Dr. Parada,  Would you like to wait until the patient is seen on 7/24/19 to re-evaluate his medication, or would you like him seen sooner?  Patient has 75 tablets of his losartan-hydrochlorothiazide 50-12.5 mg tablets left.    Please advise.  Thank you.  Christina STEINER CMA/TANYA....................2:59 PM

## 2021-06-16 NOTE — TELEPHONE ENCOUNTER
Mickey's wife calls in to report that he would like to establish care with Dr. Herbert after Dr. Parada's USP.  He is having some ongoing dizziness and some upper arm achiness at times. Denies any chest pain or heart issues.  Sent to scheduling for an appointment.    Also told to take  Blood pressure to see if medications  Might need adjustment and be part of the dizziness issue.   Reason for Disposition    [1] MILD dizziness (e.g., walking normally) AND [2] has NOT been evaluated by physician for this  (Exception: dizziness caused by heat exposure, sudden standing, or poor fluid intake)    Protocols used: DIZZINESS - [id number]-D-OT

## 2021-06-16 NOTE — TELEPHONE ENCOUNTER
Telephone Encounter by Anastasia Mike CMA at 10/21/2019  5:05 PM     Author: Anastasia Mike CMA Service: -- Author Type: Certified Medical Assistant    Filed: 10/21/2019  5:06 PM Encounter Date: 10/21/2019 Status: Signed    : Anastasia Mike CMA (Certified Medical Assistant)       Lalit Parada MD  You 1 hour ago (3:33 PM)      bid    Routing comment      Ok to set this up for you to sign or wait for PCP return on Wednesday? Thank you.    Anastasia Mike CMA

## 2021-06-16 NOTE — PATIENT INSTRUCTIONS - HE
Establish care visit for this 75-year-old man, previously patient Dr. Sorenson, who is a retired  and .  He has enjoyed generally good health over the last year, but did have a symptom to report today:    Brief 1 to 2-second episodes of blurry vision, lightheadedness, pressure behind eyes, which I wonder whether this might be symptoms of sinus congestion and irritation; long history of seasonal allergic rhinitis    The symptoms have been going on since approximately February.  Frequency seems to have have increased, and is now experiencing these on most days, maybe twice a day, again lasting only 1 to 2 seconds.  We are now approaching the height of allergy season.    He has a long history of seasonal nasal allergies and sinus congestion problems.  I told him that the ethmoid sinuses are actually just behind the eyes, and perhaps his symptoms could be sinus trouble.  I am quite convinced that there is no concern here for stroke.  He did see his ophthalmologist twice, and was told that there is not an ocular explanation for the symptom.    He told me that he is using a or over-the-counter antihistamine tablet.  I told him that he could intensify his allergy treatment by using over-the-counter nasal steroid spray, for example Flonase (fluticasone) or Nasonex (mometasone).  The spray can be used every day during allergy season.    Peripheral neuropathy with numbness in his feet, also restless leg syndrome, has been working with neurologist Dr. Gallegos, and is currently on gabapentin 300 mg taken twice a day.  He has undergone EMG which demonstrated neuropathy.  Laboratory work did not identify an underlying cause.  He experiences pins-and-needles dysesthesias.  Ferritin level was normal.  Past trial of Cymbalta did not produce much relief.  He does find gabapentin helpful and has found a dose that seems to give him adequate relief at 300 mg twice a day.    Essential hypertension, blood pressure  nicely controlled on combination of losartan 50 mg a day with hydrochlorothiazide 12.5 mg a day.  Goal blood pressure is 120/80.    Chronic kidney disease stage IIIa with GFR of 51 and creatinine of approximately 1.35 most recently measured November 20, 2020.  The losartan component of his blood pressure medicine will help protect his kidneys for the long run.  There are certain medications he needs to avoid because of his kidneys, most notably the nonsteroidal anti-inflammatory drug such as ibuprofen and naproxen.  Tylenol (acetaminophen) is okay to use.    Benign prostatic hyperplasia with urinary frequency, Flomax was ineffective, PSA was quite satisfactory only 1.0 measured November 20, 2020.  He told me that he empties his bladder completely, and that is the most important thing.    Prediabetes in the context of obesity, A1c was 6.0 when measured April 2018.  When he comes back for routine lab work later this autumn, will recheck his A1c.    Obesity with body mass index of 34.3.  I would like to see him lose 20 pounds this year.  I reminded him about the importance of eating slower, controlling portion size, and identifying problem foods to curtail or eliminate, especially starches, sweets, fried foods.    Lumbar spinal stenosis, for which has been getting epidural steroid injections approximately twice a year.  Currently working with [facility] orthopedics.  I told him that losing weight would help his back.  He also needs to do his home exercises to keep his core muscles strong and encouraged good posture.  He told me that he might use 1 or 2 tramadol tablets a week for his back.  I reminded him to minimize the use of the tramadol, because it can contribute to constipation.  I will renew his prescription today for 30 tablets.    Chronic neck pain.  Similar to his back, he needs to work on strengthening her upper back muscles also deep neck muscles, sleep on a properly supportive pillow, and maintain mobility  and flexibility.    History of Norton's esophagus negative for dysplasia on most recent endoscopy October 28, 2020.  Recheck 3 years after that which would be October 2023.   Maintained on omeprazole.    History of pernicious anemia taking oral vitamin B12, with normal blood cell counts November 20, 2020.  Normal vitamin B12 level as well.    History of recurrent bouts of pancreatitis 1989, 1990, 96, 2008, with a 1.1 x 1.9 cm pancreatic cyst most recently imaged by CT October 19, 2018, stable in size.  He told me that alcohol consumption is 0.    Status post right total knee arthroplasty, initially 2004, redo in 2009, now doing well.    Status post cataract surgery both eyes 2018, dry eye syndrome recently prescribed Restasis drops.    Received his second shot of Pfizer Covid vaccine March 5, 2021.    Constipation, he told me that he might go 2 to 3 days without a bowel movement.  I reminded him to minimize the tramadol opioid.  He asked about Linzess, but I think he needs to first go with the first line management which would be MiraLAX (polyethylene glycol powder).  I told him that he could take 1 capful of MiraLAX powder a day, diluted into his favorite beverage.    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

## 2021-06-16 NOTE — TELEPHONE ENCOUNTER
Telephone Encounter by Christina Aguilar CMA at 5/8/2019 11:33 AM     Author: Christina Aguilar CMA Service: -- Author Type: Medical Assistant    Filed: 5/8/2019 11:36 AM Encounter Date: 5/7/2019 Status: Signed    : Christina Aguilar CMA (Medical Assistant)       Spoke with the patient's wife, Lucy Mcfarland, and relayed the following message from Dr. Parada:  Lalit Parada MD  You 4 hours ago (7:25 AM)      Wait till appt    Routing comment      She verbalized understanding and had no further questions at this time.  Christina STEINER CMA/TANYA....................11:35 AM

## 2021-06-17 NOTE — TELEPHONE ENCOUNTER
Telephone Encounter by Christina Aguilar CMA at 4/7/2020 11:42 AM     Author: Christina Aguilar CMA Service: -- Author Type: Medical Assistant    Filed: 4/7/2020 11:42 AM Encounter Date: 3/25/2020 Status: Signed    : Christina Aguilar CMA (Medical Assistant)       Dr. Parada,  Per the following message:  Lalit Parada MD  You 2 minutes ago (11:39 AM)       OK to miss a month. You can get over-the-counter B12- 1 mg to take one a day. This It's not as effective but we can do this while you are unable to get the Intramuscular b 12      Did you want to sent a prescription for B12?  Please advise.  Thank you.  Christina STEINER CMA/TANYA....................11:42 AM

## 2021-06-17 NOTE — PROGRESS NOTES
OFFICE VISIT NOTE  Mickey Desouza   72 y.o. male            Assessment/Plan for  Mickey Desouza is a 72 y.o. male.  No Patient Care Coordination Note on file.       1. Peripheral sensory neuropathy  Controlled fairly well gabapentin but fatigue.  Will try lower dose.  He should also use the diclofenac if needed.  Caution with his Norton's esophagus    2. Essential hypertension  Controlled    3. Fatigue  Nonspecific but appears to be related to his gabapentin we are decreasing the dose    4. Norton esophagus  Should be on endoscopic surveillance discussed    5. Gastroesophageal reflux disease with esophagitis  No symptomatology of obstruction or reflux    6. BPH with obstruction/lower urinary tract symptoms  Trial of supplemental.  Failed oxybutynin, Flomax, Proscar.  Urology visit reviewed 2016         Plan:  As above   laboratory  Patient request hep C screen  Saw [facility]   clinic visit 3-4 months  Check A1c with his neuropathy.  His sugar was a bit high.  He is getting B12 injections.  He is compliant    There are no Patient Instructions on file for this visit.    Diagnoses and all orders for this visit:    Peripheral sensory neuropathy  -     gabapentin (NEURONTIN) 100 MG capsule; Take 100 mg by mouth at bedtime.  Dispense: 90 capsule; Refill: 0  -     HM1(CBC and Differential)  -     Glycosylated Hemoglobin A1c  -     Vitamin B12  -     HM1 (CBC with Diff)    Essential hypertension    Fatigue  -     Hepatic Profile    Norton esophagus    Gastroesophageal reflux disease with esophagitis    BPH with obstruction/lower urinary tract symptoms  -     Renal Function Profile  -     Urinalysis-UC if Indicated    Elevated hemoglobin A1c  -     Cancel: Glucose    Preventative health care  -     Hepatitis C Antibody (Anti-HCV)    Hyperglycemia   -     Glycosylated Hemoglobin A1c        Medications after visit  Current Outpatient Prescriptions   Medication Sig Dispense Refill     amoxicillin (AMOXIL) 500 MG capsule  TAKE 4 CAPSULES BY MOUTH ONE HOUR BEFORE DENTAL APPOINTMENT  6     aspirin 81 MG EC tablet Take 81 mg by mouth daily.       diclofenac (VOLTAREN) 75 MG EC tablet TAKE 1 TABLET TWICE DAILY  1     glucosamine-chondroitin 500-400 mg cap Take 1 capsule by mouth 2 (two) times a day.       L.ACIDOPH/B.LONG/L.PLANT/B.LAC (PROBIOTIC ACIDOPHILUS BEADS ORAL) Take by mouth daily. 2 tabs twice daily        losartan-hydrochlorothiazide (HYZAAR) 50-12.5 mg per tablet TAKE 1 TABLET BY MOUTH DAILY. 90 tablet 3     multivitamin with minerals (THERA-M) 9 mg iron-400 mcg Tab tablet Take 1 tablet by mouth daily.       omeprazole (PRILOSEC) 20 MG capsule Take 20 mg by mouth daily.       traMADol (ULTRAM) 50 mg tablet Take 50 mg by mouth as needed for pain.       gabapentin (NEURONTIN) 100 MG capsule Take 100 mg by mouth at bedtime. 90 capsule 0     Current Facility-Administered Medications   Medication Dose Route Frequency Provider Last Rate Last Dose     cyanocobalamin injection 1,000 mcg  1,000 mcg Intramuscular Q30 Days Lalit Parada MD   1,000 mcg at 04/19/18 0953                      Lalit Parada MD  Internal medicine  [facility]  [phone]  [email or web address]    Much or all of the text in this note was generated through the use of Dragon Dictate voice-to-text software. Errors in spelling or words which seem out of context are unintentional.   Sound alike errors, in particular, may have escaped editing.                 Subjective:   Chief Complaint:  Peripheral Neuropathy (Bilateral feet); Back Pain (Low back); and Medication Problem (Possible side effects with Gabapentin)    Patient is here for follow-up    Major problem is his fatigue    He relates this is to his Neurontin    He takes 300 at 6-8 p.m. at night.  This helps him sleep.  Otherwise he has some burning.  He is fatigued in the a.m.  This goes away by the afternoon.  He has not tried a lower dose of gabapentin.  He is taking  his B12.  His A1c was borderline.        He takes full diclofenac once a day.  Mostly for his back.  He is cautious because of his GERD    Trouble with urination  Slow stream  Nocturia ×3  Had seen urology summer 2016  Has tried Flomax oxybutynin Proscar without benefit    Strands of talked about saw palmetto.    He believes the fatigue is related to gabapentin.  He is not having any chest pain shortness of breath.  His appetite is good bowels are regular no melena no hematochezia.  Does have chronic low back pain unchanged    Hypertension-There are no cardiovascular, respiratory, neurologic complaints.No claudication.There is no orthostasis.Patient is compliant with medications.  Medications reviewed.   No side effects from medication.    Review of Systems:     Extensive 10-point review of systems was performed. Please see the HPI for problem specific pertinent review of systems.     Patient does note his sleep is good with the gabapentin    Otherwise, the following systems are noncontributory including constitutional, eyes, ears, nose and throat, cardiovascular, respiratory, gastrointestinal, genitourinary, musculoskeletal,neurological, skin and/or breast, endocrine, hematologic/lymph, allergic/immunologic and psychiatric.              Medications:  Current Outpatient Prescriptions on File Prior to Visit   Medication Sig     amoxicillin (AMOXIL) 500 MG capsule TAKE 4 CAPSULES BY MOUTH ONE HOUR BEFORE DENTAL APPOINTMENT     aspirin 81 MG EC tablet Take 81 mg by mouth daily.     diclofenac (VOLTAREN) 75 MG EC tablet TAKE 1 TABLET TWICE DAILY     glucosamine-chondroitin 500-400 mg cap Take 1 capsule by mouth 2 (two) times a day.     L.ACIDOPH/B.LONG/L.PLANT/B.LAC (PROBIOTIC ACIDOPHILUS BEADS ORAL) Take by mouth daily. 2 tabs twice daily      losartan-hydrochlorothiazide (HYZAAR) 50-12.5 mg per tablet TAKE 1 TABLET BY MOUTH DAILY.     multivitamin with minerals (THERA-M) 9 mg iron-400 mcg Tab tablet Take 1 tablet by  "mouth daily.     omeprazole (PRILOSEC) 20 MG capsule Take 20 mg by mouth daily.     traMADol (ULTRAM) 50 mg tablet Take 50 mg by mouth as needed for pain.     [DISCONTINUED] gabapentin (NEURONTIN) 300 MG capsule Take 300 mg by mouth daily.     [DISCONTINUED] gabapentin (NEURONTIN) 300 MG capsule TAKE 1 CAPSULE (300 MG TOTAL) BY MOUTH BEDTIME.     Current Facility-Administered Medications on File Prior to Visit   Medication     cyanocobalamin injection 1,000 mcg            Allergies:  Allergies   Allergen Reactions     Morphine      Causes upset stomach and pain;       PSFHx: Tobacco Status:  He  reports that he has never smoked. He has never used smokeless tobacco.   Alcohol Status:    History   Alcohol Use     Yes     Comment: occasionally, beer       reports that he has never smoked. He has never used smokeless tobacco. He reports that he drinks alcohol. His drug history is not on file.    Objective:    /78 (Patient Site: Right Arm, Patient Position: Sitting, Cuff Size: Adult Regular)  Pulse 65  Ht 5' 5\" (1.651 m)  Wt 204 lb 0.6 oz (92.6 kg)  SpO2 97%  BMI 33.95 kg/m2  Weight:   Wt Readings from Last 3 Encounters:   04/25/18 204 lb 0.6 oz (92.6 kg)   10/05/17 202 lb 1.3 oz (91.7 kg)   12/15/16 203 lb 0.6 oz (92.1 kg)     BP Readings from Last 3 Encounters:   04/25/18 126/78   10/05/17 124/82   12/15/16 136/80         General-appears well, no acute distress.  Skin: Normal. No rash or lesion  Head:  Normocephalic, symmetric  Speech-clear  Eyes: Eyes midline full EOM.  External exams normal.  No icterus  Neck:  No palpable masses, lymphadenopathy or tenderness. No thyromegaly or goiter  Carotid Arteries:  Equal pulsations bilateral.No Bruit, normal upstroke  Chest Wall: No deformity or pain elicited on compression.  Respiratory:  Normal respiratory effort.  Lungs are clear with good breath sounds.  No dullness.  No wheezing.  Heart: Regular rhythm.  Normal sounding S1, S2 without S3, S4, murmurs, rubs, or " gallops.  Extremities-no edema  Excellent pulses    Foot examination.  Decreased vibratory sense to supramalleolar area bilaterally  Good peripheral pulses  Both feet  Capillary refill okay  Mild skin atrophy.  Gait-normal.         Review of clinical lab tests  Lab Results   Component Value Date    HGB 13.3 (L) 06/13/2009     12/15/2016    K 4.2 12/15/2016     12/15/2016    CREATININE 1.39 (H) 12/15/2016    BUN 24 12/15/2016    CO2 31 12/15/2016    PSA 1.0 03/15/2016    INR 1.31 (H) 06/14/2009    HGBA1C 6.4 (H) 12/15/2016     Lab Results   Component Value Date    UEXVJSJK85 357 09/29/2016       Glucose   Date/Time Value Ref Range Status   12/15/2016 08:27  74 - 125 mg/dL Final   09/29/2016 09:06  74 - 125 mg/dL Final   03/15/2016 09:05 AM 84 70 - 125 mg/dL Final   11/19/2015 01:50  74 - 125 mg/dL Final     No results found for this or any previous visit (from the past 24 hour(s)).    RADIOLOGY: No results found.    Review of recent consultation-

## 2021-06-18 NOTE — PROGRESS NOTES
Preoperative Consultation   Mickey Desouza   73 y.o.  male    Date of visit: 6/21/2018  Physician: Jf Lee MD    This is a preoperative consultation requested by Dr. Bartlett in preparation for cataract surgery on 7/2/2018 of the right eye and 7/18/2018 of the left eye at [facility] in [facility].       Assessment and Plan   Mickey Desouza was seen in preoperative consultation in preparation for bilateral cataract surgery.  This is a low risk surgery and the patient has no increased risk for major cardiac complications.  Please note he will continue aspirin through surgery.  He will hold losartan and hydrochlorothiazide the morning of surgery.  No medical contraindication to proposed surgery.    1. Preoperative examination    2. Cataract    3. Polyneuropathy (H)    4. Gastroesophageal reflux disease with esophagitis    5. BPH with obstruction/lower urinary tract symptoms    6. Essential hypertension    7. Pernicious anemia    8. CKD (chronic kidney disease) stage 3, GFR 30-59 ml/min         Patient Profile   Social History     Social History Narrative    Kari 1969    Retired      Son- Arnold    [place], [place]        Past Medical History   Patient Active Problem List   Diagnosis     Norton esophagus     Hypertension     GERD (gastroesophageal reflux disease)     BPH with obstruction/lower urinary tract symptoms     Pernicious anemia     CKD (chronic kidney disease) stage 3, GFR 30-59 ml/min       Past Surgical History  He has a past surgical history that includes Cholecystectomy (1995); Knee arthroscopy (Bilateral); and Replacement total knee (Right, 2004, 2009).     History of Present Illness   This 73 y.o. old man comes in for preoperative evaluation.  He is going to have cataract surgery.  He is otherwise feeling okay.  He has no chest pain no shortness of breath.  He has no infectious symptoms.  No history of heart disease or other vascular disease.  He does not have  diabetes.  He does have hypertension his blood pressures been well controlled.  He does have mild chronic kidney disease.    Recent antiplatelet use: yes Aspirin  Personal or family history of bleeding or clotting disorders: no  Steroid use in the past year: no  Personal or family history of difficulty with anesthesia: no  Current cardiopulmonary symptoms: no    Review of Systems: A comprehensive review of systems was negative except as noted.     Medications and Allergies   Current Outpatient Prescriptions   Medication Sig Dispense Refill     amoxicillin (AMOXIL) 500 MG capsule TAKE 4 CAPSULES BY MOUTH ONE HOUR BEFORE DENTAL APPOINTMENT  6     aspirin 81 MG EC tablet Take 81 mg by mouth daily.       diclofenac (VOLTAREN) 75 MG EC tablet TAKE 1 TABLET TWICE DAILY  1     gabapentin (NEURONTIN) 100 MG capsule Take 100 mg by mouth at bedtime. (Patient taking differently: Take 300 mg by mouth at bedtime. ) 90 capsule 0     glucosamine-chondroitin 500-400 mg cap Take 1 capsule by mouth 2 (two) times a day.       losartan-hydrochlorothiazide (HYZAAR) 50-12.5 mg per tablet TAKE 1 TABLET BY MOUTH DAILY. 90 tablet 3     multivitamin with minerals (THERA-M) 9 mg iron-400 mcg Tab tablet Take 1 tablet by mouth daily.       omeprazole (PRILOSEC) 20 MG capsule Take 20 mg by mouth daily.       traMADol (ULTRAM) 50 mg tablet Take 50 mg by mouth as needed for pain.       gabapentin (NEURONTIN) 300 MG capsule Take 1 capsule (300 mg total) by mouth at bedtime. 90 capsule 3     Current Facility-Administered Medications   Medication Dose Route Frequency Provider Last Rate Last Dose     cyanocobalamin injection 1,000 mcg  1,000 mcg Intramuscular Q30 Days Lalit Parada MD   1,000 mcg at 05/24/18 6721     Allergies   Allergen Reactions     Morphine      Causes upset stomach and pain;        Family and Social History   Family History   Problem Relation Age of Onset     Pancreatic cancer Mother      Heart attack Father      Breast cancer  "Sister      Liver disease Brother      Other Sister      head bleed after fall     Other Brother      Diabetes Brother      Diabetes Brother      Other Brother      WWII     No Medical Problems Son         Social History   Substance Use Topics     Smoking status: Never Smoker     Smokeless tobacco: Never Used     Alcohol use Yes      Comment: occasionally, beer        Physical Exam   General Appearance:   No acute distress    /70 (Patient Site: Right Arm, Patient Position: Sitting, Cuff Size: Adult Regular)  Pulse 70  Ht 5' 5\" (1.651 m)  Wt 204 lb (92.5 kg)  SpO2 96%  BMI 33.95 kg/m2    EYES: Eyelids, conjunctiva, and sclera were normal. Pupils were normal.   HEAD, EARS, NOSE, MOUTH, AND THROAT: Head and face were normal. Hearing was normal to voice and the ears were normal to external exam. Nose appearance was normal and there was no discharge. Oropharynx was normal.  NECK: Neck appearance was normal. There were no neck masses and the thyroid was not enlarged.  RESPIRATORY: Breathing pattern was normal and the chest moved symmetrically.  Percussion/auscultatory percussion was normal.  Lung sounds were normal and there were no abnormal sounds.  CARDIOVASCULAR: Heart rate and rhythm were normal.  S1 and S2 were normal and there were no extra sounds or murmurs. Peripheral pulses in arms and legs were normal.  Jugular venous pressure was normal.  There was no peripheral edema.  GASTROINTESTINAL: The abdomen was normal in contour.  Bowel sounds were present.  Percussion detected no organ enlargement or tenderness.  Palpation detected no tenderness, mass, or enlarged organs.   MUSCULOSKELETAL: Skeletal configuration was normal and muscle mass was normal for age. Joint appearance was overall normal.  LYMPHATIC: There were no enlarged nodes.  SKIN/HAIR/NAILS: Skin color was normal.  There were no skin lesions.  Hair and nails were normal.  NEUROLOGIC: The patient was alert and oriented to person, place, time, " and circumstance. Speech was normal. Cranial nerves were normal. Motor strength was normal for age. The patient was normally coordinated.  PSYCHIATRIC:  Mood and affect were normal and the patient had normal recent and remote memory. The patient's judgment and insight were normal.         Additional Tests   Laboratory: Potassium normal creatinine stable 1.5    Electrocardiogram: Not indicated    Total time spent with the patient today was 40 minutes of which > 50% was spent in counseling and coordination of care     Jf Lee MD  Internal Medicine  Contact me at [phone]

## 2021-06-18 NOTE — PATIENT INSTRUCTIONS - HE
Patient Instructions by Jf Lee MD at 11/20/2020  8:40 AM     Author: Jf Lee MD Service: -- Author Type: Physician    Filed: 11/20/2020  4:44 PM Encounter Date: 11/20/2020 Status: Signed    : Jf Lee MD (Physician)         Patient Education     Exercise for a Healthier Heart  You may wonder how you can improve the health of your heart. If youre thinking about exercise, youre on the right track. You dont need to become an athlete, but you do need a certain amount of brisk exercise to help strengthen your heart. If you have been diagnosed with a heart condition, your doctor may recommend exercise to help stabilize your condition. To help make exercise a habit, choose safe, fun activities.       Be sure to check with your health care provider before starting an exercise program.    Why exercise?  Exercising regularly offers many healthy rewards. It can help you do all of the following:    Improve your blood cholesterol levels to help prevent further heart trouble    Lower your blood pressure to help prevent a stroke or heart attack    Control diabetes, or reduce your risk of getting this disease    Improve your heart and lung function    Reach and maintain a healthy weight    Make your muscles stronger and more limber so you can stay active    Prevent falls and fractures by slowing the loss of bone mass (osteoporosis)    Manage stress better  Exercise tips  Ease into your routine. Set small goals. Then build on them.  Exercise on most days. Aim for a total of 150 or more minutes of moderate to  vigorous intensity activity each week. Consider 40 minutes, 3 to 4 times a week. For best results, activity should last for 40 minutes on average. It is OK to work up to the 40 minute period over time. Examples of moderate-intensity activity is walking one mile in 15 minutes or 30 to 45 minutes of yard work.  Step up your daily activity level. Along with your exercise program, try  being more active throughout the day. Walk instead of drive. Do more household tasks or yard work.  Choose one or more activities you enjoy. Walking is one of the easiest things you can do. You can also try swimming, riding a bike, or taking an exercise class.  Stop exercising and call your doctor if you:    Have chest pain or feel dizzy or lightheaded    Feel burning, tightness, pressure, or heaviness in your chest, neck, shoulders, back, or arms    Have unusual shortness of breath    Have increased joint or muscle pain    Have palpitations or an irregular heartbeat      0275-9848 [employer]. [address] [place] [address]. All rights reserved. This information is not intended as a substitute for professional medical care. Always follow your healthcare professional's instructions.         Patient Education   Urinary Incontinence (Male)    Urinary incontinence means not being able to control the release of urine from the bladder.  Causes  Common causes of urinary incontinence in men include:    Infection    Certain medicines    Aging    Poor pelvic muscle tone    Bladder spasms    Obesity    Urinary retention  Nervous system diseases, diabetes, sleep apnea, urinary tract infections, prostate surgery, and pelvic trauma can also cause incontinence. Constipation and smoking have also been identified as risk factors.  Symptoms    Urge incontinence (also called overactive bladder) is a sudden urge to urinate even though there may not be much urine in the bladder. The need to urinate often during the night is common. It is due to bladder spasms.    Stress incontinence is involuntary urine leakage that can occur with sneezing, coughing, and other actions that put stress on the bladder.  Treatment  Treatment of urinary incontinence depends on the cause. Infections of the bladder are treated with antibiotics. Urinary retention is treated with a bladder catheter.  Home care  Follow these guidelines  when caring for yourself at home:    Don't consume foods and drinks that may irritate the bladder. These include drinks containing alcohol, caffeinate, or carbonation; chocolate; and acidic fruits and juices.    Limit fluid intake to 6 to 8 cups a day.    Lose weight if you are overweight. This will reduce your symptoms.    If needed, wear absorbent pads to catch urine. Change pads frequently to maintain hygiene and prevent skin and bladder infections.    Bathe daily to maintain good hygiene.    If an antibiotic was prescribed to treat a bladder infection, be sure to take it until finished, even if you are feeling better before then. This is to make sure your infection has cleared.    If a catheter was left in place, it is important to keep bacteria from getting into the collection bag. Don't disconnect the catheter from the collection bag.    Use a leg band to secure the catheter drainage tube, so it does not pull on the catheter. Drain the collection bag when it becomes full using the drain spout at the bottom of the bag. Don't disconnect the bag from the catheter.    Don't pull on or try to remove a catheter. The catheter must be removed by a healthcare provider.  Follow-up care  Follow up with your healthcare provider, or as advised.  When to seek medical advice  Call your healthcare provider right away if any of these occur:    Fever over 100.4 F (38 C), or as directed by your healthcare provider    Bladder pain or fullness    Abdominal swelling, nausea, or vomiting    Back pain    Weakness, dizziness, or fainting    If a catheter was left in place, return if:  ? Catheter falls out  ? Catheter stops draining for 6 hours  Date Last Reviewed: 10/1/2017    1847-5758 [employer]. [address] [place] [address], [place] [address]. All rights reserved. This information is not intended as a substitute for professional medical care. Always follow your healthcare professional's instructions.     Patient Education    Understanding Advance Care Planning  Advance care planning is the process of deciding ones own future medical care. It helps ensure that if you cant speak for yourself, your wishes can still be carried out. The plan is a series of legal documents that note a persons wishes. The documents vary by state. Advance care planning may be done when a person has a serious illness that is expected to get worse. It may be done before major surgery. And it can help you and your family be prepared in case of a major illness or injury. Advance care planning helps with making decisions at these times.       A health care proxy is a person who acts as the voice of a patient when the patient cant speak for himself or herself. The name of this role varies by state. It may be called a Durable Medical Power of  or Durable Power of  for Healthcare. It may be called an agent, surrogate, or advocate. Or it may be called a representative or decision maker. It is an official duty that is identified by a legal document. The document also varies by state.    Why Is Advance Care Planning Important?  If a person communicates their healthcare wishes:    They will be given medical care that matches their values and goals.    Their family members will not be forced to make decisions in a crisis with no guidance.  Creating a Plan  Making an advance care plan is often done in 3 steps:    Thinking about ones wishes. To create an advance care plan, you should think about what kind of medical treatment you would want if you lose the ability to communicate. Are there any situations in which you would refuse or stop treatment? Are there therapies you would want or not want? And whom do you want to make decisions for you? There are many places to learn more about how to plan for your care. Ask your doctor or  for resources.    Picking a health care proxy. This means choosing a trusted person to speak for you only when you  cant speak for yourself. When you cannot make medical decisions, your proxy makes sure the instructions in your advance care plan are followed. A proxy does not make decisions based on his or her own opinions. They must put aside those opinions and values if needed, and carry out your wishes.    Filling out the legal documents. There are several kinds of legal documents for advance care planning. Each one tells health care providers your wishes. The documents may vary by state. They must be signed and may need to be witnessed or notarized. You can cancel or change them whenever you wish. Depending on your state, the documents may include a Healthcare Proxy form, Living Will, Durable Medical Power of , Advance Directive, or others.  The Familys Role  The best help a family can give is to support their loved ones wishes. Open and honest communication is vital. Family should express any concerns they have about the patients choices while the patient can still make decisions.    3099-1741 [employer]. [address] [place] [address]. All rights reserved. This information is not intended as a substitute for professional medical care. Always follow your healthcare professional's instructions.         Also, [facility] offers a free, downloadable health care directive that allows you to share your treatment choices and personal preferences if you cannot communicate your wishes. It also allows you to appoint another person (called a health care agent) to make health care decisions if you are unable to do so. You can download an advance directive by going here: [email or web address]     Patient Education   Personalized Prevention Plan  You are due for the preventive services outlined below.  Your care team is available to assist you in scheduling these services.  If you have already completed any of these items, please share that information with your  care team to update in your medical record.  Health Maintenance   Topic Date Due   ? ZOSTER VACCINES (1 of 2) 05/20/1994   ? Pneumococcal Vaccine: 65+ Years (1 of 1 - PPSV23) 05/20/2009   ? INFLUENZA VACCINE RULE BASED (1) 08/01/2020   ? TD 18+ HE  08/17/2021   ? MEDICARE ANNUAL WELLNESS VISIT  11/20/2021   ? FALL RISK ASSESSMENT  11/20/2021   ? LIPID  11/20/2025   ? ADVANCE CARE PLANNING  11/20/2025   ? HEPATITIS C SCREENING  Completed   ? Pneumococcal Vaccine: Pediatrics (0 to 5 Years) and At-Risk Patients (6 to 64 Years)  Aged Out   ? HEPATITIS B VACCINES  Aged Out

## 2021-06-19 NOTE — PROGRESS NOTES
OFFICE VISIT NOTE  Mickey Desouza   73 y.o. male            Assessment/Plan for  Mickey Desouza is a 73 y.o. male.  No Patient Care Coordination Note on file.       1. Polyneuropathy (H)  300 mg-2 tabs at at bedtime.  Notify me regarding efficacy    2. Gastroesophageal reflux disease with esophagitis  Stable again needs upper GI endoscopy-surveillance    3. BPH with obstruction/lower urinary tract symptoms  May retry super beta prostate, saw palmetto    4. Essential hypertension  Excellent control  5.  Chronic constipation.  Add Metamucil/Citrucel.  He has been told this in the past  6.  Mildly elevated BUN.  Decrease Voltaren to only as needed.  Recheck renal profile.  Hydration.  Reassured.  I reviewed his old BUNs.  He is quite stable    Plan:  Limits Voltaren  Hydration  600 Neurontin at bedtime  Metamucil  Laboratory  Wellness exam 3/2019.    There are no Patient Instructions on file for this visit.    Diagnoses and all orders for this visit:    Polyneuropathy (H)  -     gabapentin (NEURONTIN) 300 MG capsule; Take 2 capsules (600 mg total) by mouth at bedtime.  Dispense: 90 capsule; Refill: 3    Gastroesophageal reflux disease with esophagitis    BPH with obstruction/lower urinary tract symptoms    Essential hypertension  -     Renal Function Profile    Chronic constipation        Medications after visit  Current Outpatient Prescriptions   Medication Sig Dispense Refill     aspirin 81 MG EC tablet Take 81 mg by mouth daily.       diclofenac (VOLTAREN) 75 MG EC tablet Take 1 tablet by mouth daily as needed.  1     gabapentin (NEURONTIN) 300 MG capsule Take 2 capsules (600 mg total) by mouth at bedtime. 90 capsule 3     glucosamine-chondroitin 500-400 mg cap Take 1 capsule by mouth 2 (two) times a day.       losartan-hydrochlorothiazide (HYZAAR) 50-12.5 mg per tablet TAKE 1 TABLET BY MOUTH DAILY. 90 tablet 3     multivitamin with minerals (THERA-M) 9 mg iron-400 mcg Tab tablet Take 1 tablet by mouth daily.        omeprazole (PRILOSEC) 20 MG capsule Take 20 mg by mouth daily.       traMADol (ULTRAM) 50 mg tablet Take 50 mg by mouth as needed for pain.       amoxicillin (AMOXIL) 500 MG capsule TAKE 4 CAPSULES BY MOUTH ONE HOUR BEFORE DENTAL APPOINTMENT  6     Current Facility-Administered Medications   Medication Dose Route Frequency Provider Last Rate Last Dose     cyanocobalamin injection 1,000 mcg  1,000 mcg Intramuscular Q30 Days Lalit Parada MD   1,000 mcg at 07/26/18 0753                      Lalit Parada MD  Internal medicine  [facility]  [phone]  [email or web address]    Much or all of the text in this note was generated through the use of Dragon Dictate voice-to-text software. Errors in spelling or words which seem out of context are unintentional.   Sound alike errors, in particular, may have escaped editing.                 Subjective:   Chief Complaint:  Back Pain; Chronic Kidney Disease (Discuss); Peripheral Neuropathy; B12 Injection; and Follow-up    Here for follow-up    Patient has constipation  Bowel movement every 3 days preceded by cramping and diarrhea.  He has been followed by GI.  Metamucil was recommended he does not know other never worked good.  I am asking him to restart this.  There is no melena no hematochezia.  He has no hematochezia, melena etc.    Hypertension-There are no cardiovascular, respiratory, neurologic complaints.No claudication.There is no orthostasis.Patient is compliant with medications.  Medications reviewed.   No side effects from medication.  Polyneuropathy.  Receiving B12.  Gabapentin used to help quite a bit however  He is waking up after about an hour at night.  He is only on 300 mg.  It is not too problematic during the day.    Review of Systems:     Extensive 10-point review of systems was performed. Please see the HPI for problem specific pertinent review of systems.     Patient does note his recent surgery went well    He  is going to be visiting with a hand surgeon Dr. Yanez regarding a trigger finger.    Otherwise, the following systems are noncontributory including constitutional, eyes, ears, nose and throat, cardiovascular, respiratory, gastrointestinal, genitourinary, musculoskeletal,neurological, skin and/or breast, endocrine, hematologic/lymph, allergic/immunologic and psychiatric.              Medications:  Current Outpatient Prescriptions on File Prior to Visit   Medication Sig     aspirin 81 MG EC tablet Take 81 mg by mouth daily.     diclofenac (VOLTAREN) 75 MG EC tablet Take 1 tablet by mouth daily as needed.     glucosamine-chondroitin 500-400 mg cap Take 1 capsule by mouth 2 (two) times a day.     losartan-hydrochlorothiazide (HYZAAR) 50-12.5 mg per tablet TAKE 1 TABLET BY MOUTH DAILY.     multivitamin with minerals (THERA-M) 9 mg iron-400 mcg Tab tablet Take 1 tablet by mouth daily.     omeprazole (PRILOSEC) 20 MG capsule Take 20 mg by mouth daily.     traMADol (ULTRAM) 50 mg tablet Take 50 mg by mouth as needed for pain.     [DISCONTINUED] gabapentin (NEURONTIN) 300 MG capsule Take 1 capsule (300 mg total) by mouth at bedtime.     amoxicillin (AMOXIL) 500 MG capsule TAKE 4 CAPSULES BY MOUTH ONE HOUR BEFORE DENTAL APPOINTMENT     [DISCONTINUED] gabapentin (NEURONTIN) 100 MG capsule Take 100 mg by mouth at bedtime. (Patient taking differently: Take 300 mg by mouth at bedtime. )     [DISCONTINUED] gabapentin (NEURONTIN) 100 MG capsule TAKE 1 CAPSULE (100 MGS) BY MOUTH AT BEDTIME.     Current Facility-Administered Medications on File Prior to Visit   Medication     cyanocobalamin injection 1,000 mcg            Allergies:  Allergies   Allergen Reactions     Morphine      Causes upset stomach and pain;       PSFHx: Tobacco Status:  He  reports that he has never smoked. He has never used smokeless tobacco.   Alcohol Status:    History   Alcohol Use     Yes     Comment: occasionally, beer       reports that he has never  "smoked. He has never used smokeless tobacco. He reports that he drinks alcohol. He reports that he does not use illicit drugs.    Objective:    /74 (Patient Site: Right Arm, Patient Position: Sitting, Cuff Size: Adult Regular)  Pulse 70  Ht 5' 5\" (1.651 m)  Wt 203 lb 1.3 oz (92.1 kg)  SpO2 97%  BMI 33.79 kg/m2  Weight:   Wt Readings from Last 3 Encounters:   07/26/18 203 lb 1.3 oz (92.1 kg)   06/21/18 204 lb (92.5 kg)   04/25/18 204 lb 0.6 oz (92.6 kg)     BP Readings from Last 3 Encounters:   07/26/18 128/74   06/21/18 122/70   04/25/18 126/78     Demeanor-normal  Skin-normal  General-appears well, no acute distress.  Pulses regular  Chest is clear  Cardiac is regular without murmur  Extremities no edema  Gait unremarkable  No edema      Review of clinical lab tests  Lab Results   Component Value Date    WBC 7.2 04/25/2018    HGB 13.7 (L) 04/25/2018    HCT 40.6 04/25/2018     04/25/2018    ALT 61 (H) 05/24/2018    AST 34 05/24/2018     04/25/2018    K 4.3 06/21/2018     04/25/2018    CREATININE 1.49 (H) 06/21/2018    BUN 26 04/25/2018    CO2 25 04/25/2018    PSA 1.0 03/15/2016    INR 1.31 (H) 06/14/2009    HGBA1C 6.0 04/25/2018     BUN   Date/Time Value Ref Range Status   04/25/2018 08:53 AM 26 8 - 28 mg/dL Final   12/15/2016 08:27 AM 24 8 - 28 mg/dL Final   09/29/2016 09:06 AM 27 8 - 28 mg/dL Final   03/15/2016 09:05 AM 17 8 - 28 mg/dL Final   11/19/2015 01:50 PM 20 8 - 28 mg/dL Final         Glucose   Date/Time Value Ref Range Status   04/25/2018 08:53 AM 79 70 - 125 mg/dL Final   12/15/2016 08:27  74 - 125 mg/dL Final   09/29/2016 09:06  74 - 125 mg/dL Final   03/15/2016 09:05 AM 84 70 - 125 mg/dL Final   11/19/2015 01:50  74 - 125 mg/dL Final     No results found for this or any previous visit (from the past 24 hour(s)).    RADIOLOGY: No results found.    Review of recent consultation-reviewed renal profile    "

## 2021-06-21 NOTE — PROGRESS NOTES
OFFICE VISIT NOTE         Assessment/Plan for  Mickey Desouza is a 73 y.o. male.  No Patient Care Coordination Note on file.       1.  Right flank pain.  Suspect musculoskeletal.  I will get a chest x-ray and also with the CT scan to look at his kidney.  He does have history of a pancreatic cyst which had been stable on last imaging from [facility]  2.  Progressive polyneuropathy in his legs.  Hands okay.  Suspect this is from his back.  He gets joint injections every 6 months of [facility].  Will discontinue his gabapentin.  New Rx Lyrica.  Start 75 twice daily.  Neurologic consultation  3.  Hypertension-excellent control  4.  History of mild chronic kidney disease.-Hydrate prior to CT.  Check renal function.    Plan:  Laboratory  Chest x-ray  Discontinue gabapentin  Lyrica 75 twice daily  Neurologic consult  CT abdomen  3-month follow-up      There are no Patient Instructions on file for this visit.    Diagnoses and all orders for this visit:    Essential hypertension    Polyneuropathy  -     pregabalin (LYRICA) 75 MG capsule; Take 1 capsule (75 mg total) by mouth 2 (two) times a day.  Dispense: 60 capsule; Refill: 1  -     Ambulatory referral to Neurology  -     HM1(CBC and Differential)  -     Erythrocyte Sedimentation Rate  -     Urinalysis-UC if Indicated  -     Basic Metabolic Panel  -     Hepatic Profile  -     Electrophoresis, Protein, Serum  -     HM1 (CBC with Diff)    Gastroesophageal reflux disease with esophagitis    CKD (chronic kidney disease) stage 3, GFR 30-59 ml/min (H)    Abnormal liver enzymes    Right flank pain  -     XR Chest 2 Views; Future; Expected date: 10/15/18  -     CT Abdomen Pelvis With Oral With IV Contrast; Future; Expected date: 10/15/18    Lumbar and sacral arthritis        Medications after visit  Current Outpatient Prescriptions   Medication Sig Dispense Refill     aspirin 81 MG EC tablet Take 81 mg by mouth daily.       diclofenac (VOLTAREN) 75 MG EC tablet  Take 1 tablet by mouth daily as needed.  1     glucosamine-chondroitin 500-400 mg cap Take 1 capsule by mouth 2 (two) times a day.       losartan-hydrochlorothiazide (HYZAAR) 50-12.5 mg per tablet Take 1 tablet by mouth daily. 90 tablet 3     multivitamin with minerals (THERA-M) 9 mg iron-400 mcg Tab tablet Take 1 tablet by mouth daily.       omeprazole (PRILOSEC) 20 MG capsule Take 20 mg by mouth daily.       traMADol (ULTRAM) 50 mg tablet Take 50 mg by mouth as needed for pain.       pregabalin (LYRICA) 75 MG capsule Take 1 capsule (75 mg total) by mouth 2 (two) times a day. 60 capsule 1     Current Facility-Administered Medications   Medication Dose Route Frequency Provider Last Rate Last Dose     cyanocobalamin injection 1,000 mcg  1,000 mcg Intramuscular Q30 Days Lalit Parada MD   1,000 mcg at 09/27/18 1015                    Lalit Parada MD  Internal medicine  [facility]  [phone]    This is an electronically verified report by Lalit Parada M.D.  (Note created with Dragon voice recognition and unintended spelling errors and word substitutions may occur)         Subjective:   Chief Complaint:  Back Pain (Middle back x 3wks) and Headache (Pain on R side of head)    New issue  Right posterior chest pain/flank pain.  Duration 4 weeks  No liane trauma but did help some me that the scooter and this happened after.  Did see his chiropractor  Manipulation not helping  Patient does take diclofenac    Quit smoking age 19  No shortness of breath no pleurisy no hemoptysis  His appetite is good  He also has some epigastric discomfort.  He has some upcoming endoscopy.  He is on chronic PPI.  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  Reviewed his last CT which is been stable since 2008.    No bowel change.    Hypertension-There are no cardiovascular, respiratory, neurologic complaints.No claudication.There is no orthostasis.Patient is compliant  with medications.  Medications reviewed.   No side effects from medication.  On chronic PPI.  Norton's.  No dysphasia.  No melena nor hematochezia.    Family history is positive for diabetes  He rarely drinks  He does have spinal arthritis  Gets injections at [facility]  Every 6 months    PSFHx:: Past Medical History, Social History, and Family History reviewed and updated.  Medications and Allergies reviewed and reconciled.    Medications:  Current Outpatient Prescriptions   Medication Sig Dispense Refill     aspirin 81 MG EC tablet Take 81 mg by mouth daily.       diclofenac (VOLTAREN) 75 MG EC tablet Take 1 tablet by mouth daily as needed.  1     glucosamine-chondroitin 500-400 mg cap Take 1 capsule by mouth 2 (two) times a day.       losartan-hydrochlorothiazide (HYZAAR) 50-12.5 mg per tablet Take 1 tablet by mouth daily. 90 tablet 3     multivitamin with minerals (THERA-M) 9 mg iron-400 mcg Tab tablet Take 1 tablet by mouth daily.       omeprazole (PRILOSEC) 20 MG capsule Take 20 mg by mouth daily.       traMADol (ULTRAM) 50 mg tablet Take 50 mg by mouth as needed for pain.       pregabalin (LYRICA) 75 MG capsule Take 1 capsule (75 mg total) by mouth 2 (two) times a day. 60 capsule 1     Current Facility-Administered Medications   Medication Dose Route Frequency Provider Last Rate Last Dose     cyanocobalamin injection 1,000 mcg  1,000 mcg Intramuscular Q30 Days Lalit Parada MD   1,000 mcg at 09/27/18 1015     Allergies:  Allergies   Allergen Reactions     Lisinopril Cough     Morphine      Causes upset stomach and pain;     PSFHx: Tobacco Status:  He  reports that he has never smoked. He has never used smokeless tobacco.    Review of Systems:     Extensive 12-point review of systems was performed. Please see the HPI for problem specific pertinent review of systems.     Patient does note his appetite is good.  His urination is normal.  He has no history of kidney stones.  No hematuria    Otherwise, the following  "systems are noncontributory including constitutional, eyes, ears, nose and throat, cardiovascular, respiratory, gastrointestinal, genitourinary, musculoskeletal,neurological, skin and/or breast, endocrine, hematologic/lymph, allergic/immunologic and psychiatric.    .  .    Objective:    /76 (Patient Site: Right Arm, Patient Position: Sitting, Cuff Size: Adult Large)  Pulse 77  Ht 5' 5\" (1.651 m)  Wt 208 lb 0.6 oz (94.4 kg)  SpO2 97%  BMI 34.62 kg/m2  Weight:   Wt Readings from Last 3 Encounters:   10/15/18 208 lb 0.6 oz (94.4 kg)   07/26/18 203 lb 1.3 oz (92.1 kg)   06/21/18 204 lb (92.5 kg)     [unfilled]  208 lb 0.6 oz (94.4 kg)    In general, no acute distress.  Psych-affect appropriate for situation    Skin-unremarkable. No rash or petichae  Lymph-no lymphadenopathy  Eyes-sclera white,midline     No neck adenopathy  Lungs clear  No dullness at bases  Cardiac regular without murmur  Abdomen totally benign without mass or organomegaly  Extremities no edema good pulses  Gait normal    Flexion of spine unremarkable    Overlying skin normal    Results for orders placed or performed in visit on 12/15/16   Basic Metabolic Panel   Result Value Ref Range    Sodium 138 136 - 145 mmol/L    Potassium 4.2 3.5 - 5.1 mmol/L    Chloride 100 98 - 107 mmol/L    CO2 31 21 - 32 mmol/L    Anion Gap, Calculation 7 5 - 18 mmol/L    Glucose 100 74 - 125 mg/dL    Calcium 9.1 8.5 - 10.1 mg/dL    BUN 24 8 - 28 mg/dL    Creatinine 1.39 (H) 0.70 - 1.30 mg/dL    GFR MDRD Af Amer >60 >60 mL/min/1.73m2    GFR MDRD Non Af Amer 50 (L) >60 mL/min/1.73m2         LABS: No results found for this or any previous visit (from the past 24 hour(s)).-Ordered today  RADIOLOGY: No results found.-Order today      DATA REVIEWED:    Additional History from Old Records Summarized (2): Reviewed [facility].  History of pancreatic cyst.  History of Norton's.  Review MR 2016 significant spinal arthritis  Decision to Obtain Records / Care " everywhere (1): Reviewed records as above  Radiology Tests Summarized or Ordered (1): CT ordered  Labs Reviewed or Ordered (1): Lab ordered  Medicine Test Summarized or Ordered (1): Last renal function reviewed  Independent Review of EKG or X-RAY(2 each): Review chest x-ray from today

## 2021-06-22 NOTE — ANESTHESIA PREPROCEDURE EVALUATION
Anesthesia Evaluation      Patient summary reviewed   No history of anesthetic complications     Airway   Mallampati: I  Neck ROM: full   Pulmonary - negative ROS and normal exam                          Cardiovascular - negative ROS and normal exam  (+) hypertension well controlled, ,      Neuro/Psych - negative ROS     Endo/Other - negative ROS      GI/Hepatic/Renal - negative ROS   (+) GERD well controlled,   chronic renal disease CRI,     Comments: Norton's esoph, hiatal hernia            Dental - normal exam                        Anesthesia Plan  Planned anesthetic: MAC    ASA 2   Induction: intravenous   Anesthetic plan and risks discussed with: patient and spouse    Post-op plan: routine recovery

## 2021-06-22 NOTE — ANESTHESIA CARE TRANSFER NOTE
Last vitals:   Vitals:    12/21/18 0844   BP: 138/64   Pulse: 67   Resp: 16   Temp: 36.4  C (97.6  F)   SpO2: 95%     Patient's level of consciousness is drowsy  Spontaneous respirations: yes  Maintains airway independently: yes  Dentition unchanged: yes  Oropharynx: oropharynx clear of all foreign objects    QCDR Measures:  ASA# 20 - Surgical Safety Checklist: WHO surgical safety checklist completed prior to induction    PQRS# 430 - Adult PONV Prevention: 4558F - Pt received => 2 anti-emetic agents (different classes) preop & intraop  ASA# 8 - Peds PONV Prevention: NA - Not pediatric patient, not GA or 2 or more risk factors NOT present  PQRS# 424 - Kimberly-op Temp Management: 4559F - At least one body temp DOCUMENTED => 35.5C or 95.9F within required timeframe  PQRS# 426 - PACU Transfer Protocol: - Transfer of care checklist used  ASA# 14 - Acute Post-op Pain: ASA14B - Patient did NOT experience pain >= 7 out of 10

## 2021-06-22 NOTE — PROGRESS NOTES
Preoperative Exam    Scheduled Procedure: Upper endoscopy with ultrasound  Surgery Date:  12/21  Surgery Location: [facility], fax [phone]    Surgeon:  Dr Baer    Assessment/Plan:     1. Pancreatic lesion  Stable on recent abdominal CT.  Dr. Salmeron will be doing scopic ultrasound.  Recent upper GI endoscopy Dr. Maribel Fishman  unremarkable.  Recent liver lab unremarkable    2. Polyneuropathy  With recent change to Cymbalta by Dr. Irving Duong    3. Essential hypertension  Controlled  - Electrocardiogram Perform - Clinic    4. CKD (chronic kidney disease) stage 3, GFR 30-59 ml/min (H)  Mild.  Will recheck.  - Potassium     Surgical Procedure Risk: Low (reported cardiac risk generally < 1%)  Have you had prior anesthesia?: Yes  Have you or any family members had a previous anesthesia reaction:  No  Do you or any family members have a history of a clotting or bleeding disorder?: No  Cardiac Risk Assessment:     Patient approved for surgery with general or local anesthesia.  Hold aspirin one week before procedure    Dr. baer thank you for this consultation.  If you have any questions or concerns please do not hesitate to contact me.    Lalit Parada MD  Internal medicine  [facility]  [phone]  [email or web address]        Subjective:      Mickey Desouza is a 73 y.o. male who presents for a preoperative consultation.      Patient has a history of a pancreatic lesion.  It is been stable.  He is asymptomatic  Since CT scan unremarkable-no change  Liver enzymes unremarkable  Recent upper GI endoscopy remarkable    Patient has tolerated surgeries well  He does take a daily aspirin  He also takes some diclofenac as needed neuropathic pain    Recently started on Cymbalta for peripheral neuropathy    He has not been ill.    All other systems reviewed and are negative, other than those listed in the HPI.    Pertinent History  Do you have difficulty breathing or  chest pain after walking up a flight of stairs: No  History of obstructive sleep apnea: No  Steroid use in the last 6 months: No  Frequent Aspirin/NSAID use: No  Prior Blood Transfusion: No  Prior Blood Transfusion Reaction: No  If for some reason prior to, during or after the procedure, if it is medically indicated, would you be willing to have a blood transfusion?:  There is no transfusion refusal.    Current Outpatient Medications   Medication Sig Dispense Refill     aspirin 81 MG EC tablet Take 81 mg by mouth daily.       diclofenac (VOLTAREN) 75 MG EC tablet Take 1 tablet by mouth daily as needed.  1     glucosamine-chondroitin 500-400 mg cap Take 1 capsule by mouth 2 (two) times a day.       losartan-hydrochlorothiazide (HYZAAR) 50-12.5 mg per tablet Take 1 tablet by mouth daily. 90 tablet 3     multivitamin with minerals (THERA-M) 9 mg iron-400 mcg Tab tablet Take 1 tablet by mouth daily.       omeprazole (PRILOSEC) 20 MG capsule Take 20 mg by mouth daily.       traMADol (ULTRAM) 50 mg tablet Take 50 mg by mouth as needed for pain.       No current facility-administered medications for this visit.         Allergies   Allergen Reactions     Lisinopril Cough     Morphine      Causes upset stomach and pain;       Patient Active Problem List   Diagnosis     Norton esophagus     Hypertension     GERD (gastroesophageal reflux disease)     BPH with obstruction/lower urinary tract symptoms     CKD (chronic kidney disease) stage 3, GFR 30-59 ml/min (H)       Past Medical History:   Diagnosis Date     Allergic sinusitis      Ankle fracture 2006     Norton esophagus 11/2014     GERD (gastroesophageal reflux disease)      Hypertension      Neuropathy (H)      Osteoarthritis      Pancreatic cyst      Pancreatitis, recurrent (H)      Urinary urgency        Past Surgical History:   Procedure Laterality Date     CHOLECYSTECTOMY  1995     KNEE ARTHROSCOPY Bilateral      REPLACEMENT TOTAL KNEE Right 2004, 2009       Social  "History     Socioeconomic History     Marital status:      Spouse name: Not on file     Number of children: Not on file     Years of education: Not on file     Highest education level: Not on file   Social Needs     Financial resource strain: Not on file     Food insecurity - worry: Not on file     Food insecurity - inability: Not on file     Transportation needs - medical: Not on file     Transportation needs - non-medical: Not on file   Occupational History     Not on file   Tobacco Use     Smoking status: Never Smoker     Smokeless tobacco: Never Used   Substance and Sexual Activity     Alcohol use: No     Comment: occasionally, beer     Drug use: No     Sexual activity: Not on file   Other Topics Concern     Not on file   Social History Narrative    Kari 1969    Retired      Son- Arnold Zapien [place], [place]       Patient Care Team:  Lalit Parada MD as PCP - General (Internal Medicine)          Objective:     Vitals:    12/14/18 1451   BP: 120/80   Pulse: 80   Weight: 201 lb (91.2 kg)   Height: 5' 4.5\" (1.638 m)         Physical Exam:  Appears healthy  Good color  Skin no rash  Neck no adenopathy  Throat normal  Lungs clear  Cardiac regular without murmur  Abdomen-.  Extremities no    There are no Patient Instructions on file for this visit.    EKG: Performed today  Normal sinus rhythm Q waves 3 and aVF.  Normal V2.  No Q waves..  No change from old ECG.    Unremarkable ECG.  Unchanged from old ECG.     Labs:  Lab Results   Component Value Date    WBC 8.9 10/15/2018    HGB 13.9 (L) 10/15/2018    HCT 41.3 10/15/2018     10/15/2018    ALT 55 (H) 10/15/2018    AST 30 10/15/2018     10/15/2018    K 4.1 10/15/2018     10/15/2018    CREATININE 1.42 (H) 10/15/2018    BUN 22 10/15/2018    CO2 25 10/15/2018    TSH 1.79 12/11/2018    PSA 1.0 03/15/2016    INR 1.31 (H) 06/14/2009    HGBA1C 6.0 04/25/2018     Lab Results   Component Value Date    ALT 55 (H) 10/15/2018    AST 30 " 10/15/2018    ALKPHOS 89 10/15/2018    BILITOT 0.5 10/15/2018         Immunization History   Administered Date(s) Administered     Tdap 08/17/2011           Electronically signed by Lalit Parada MD 12/14/18 2:53 PM

## 2021-06-22 NOTE — ANESTHESIA POSTPROCEDURE EVALUATION
Patient: Mickey Desouza  ENDOSCOPIC ULTRASOUND FINE NEEDLE ASPIRATION  Anesthesia type: MAC    Patient location: PACU  Last vitals:   Vitals:    12/21/18 0947   BP: 146/71   Pulse: 62   Resp: 16   Temp: 36.6  C (97.8  F)   SpO2: 97%     Post vital signs: stable  Level of consciousness: awake and responds to simple questions  Post-anesthesia pain: pain controlled  Post-anesthesia nausea and vomiting: no  Pulmonary: unassisted, return to baseline  Cardiovascular: stable and blood pressure at baseline  Hydration: adequate  Anesthetic events: no    QCDR Measures:  ASA# 11 - Kimberly-op Cardiac Arrest: ASA11B - Patient did NOT experience unanticipated cardiac arrest  ASA# 12 - Kimberly-op Mortality Rate: ASA12B - Patient did NOT die  ASA# 13 - PACU Re-Intubation Rate: NA - No ETT / LMA used for case  ASA# 10 - Composite Anes Safety: ASA10A - No serious adverse event    Additional Notes:

## 2021-06-23 NOTE — PROGRESS NOTES
OFFICE VISIT NOTE  Mickey Desouza   73 y.o. male            Assessment/Plan for  Mickey Desouza is a 73 y.o. male.  No Patient Care Coordination Note on file.       1. Essential hypertension  Excellent control    2. Polyneuropathy  Using cannabinoid agent with gabapentin and some dizziness.  EMG was confirmatory.  We will decrease his gabapentin from 600 mg down to 400 mg.  300+100 at at bedtime he can take additional gabapentin 100 mg twice daily as needed during the day if he desires.    3. Pancreatic cyst  With recent drainage.  Reviewed.  Asymptomatic    4. Norton's esophagus without dysplasia  Chronic PPI.  No esophagitis noted on recent endoscopy         Plan:  Continue current Rx  Increase gabapentin 400 mg at bedtime  B12 injection today  Check renal profile  B12 injection today.  Clinic follow-up 6 months    There are no Patient Instructions on file for this visit.    Diagnoses and all orders for this visit:    Essential hypertension  -     Renal Function Profile    Polyneuropathy    Pancreatic cyst    Norton's esophagus without dysplasia    Other orders  -     gabapentin (NEURONTIN) 100 MG capsule  Dispense: 90 capsule; Refill: 3  -     cyanocobalamin injection 1,000 mcg        Medications after visit  Current Outpatient Medications   Medication Sig Dispense Refill     aspirin 81 MG EC tablet Take 81 mg by mouth daily.       cyanocobalamin, vitamin B-12, (B-12 COMPLIANCE) 1,000 mcg/mL Kit Inject 1 mL into the shoulder, thigh, or buttocks.       diclofenac (VOLTAREN) 75 MG EC tablet Take 1 tablet by mouth daily as needed.  1     gabapentin (NEURONTIN) 300 MG capsule Take 300 mg by mouth at bedtime.        1     glucosamine-chondroitin 500-400 mg cap Take 1 capsule by mouth 2 (two) times a day.       losartan-hydrochlorothiazide (HYZAAR) 50-12.5 mg per tablet Take 1 tablet by mouth daily. 90 tablet 3     multivitamin with minerals (THERA-M) 9 mg iron-400 mcg Tab tablet Take 1 tablet by mouth daily.        omeprazole (PRILOSEC) 20 MG capsule Take 20 mg by mouth daily.       traMADol (ULTRAM) 50 mg tablet Take 50 mg by mouth as needed for pain.       gabapentin (NEURONTIN) 100 MG capsule Take 100 mg by mouth at bedtime. 90 capsule 3     Current Facility-Administered Medications   Medication Dose Route Frequency Provider Last Rate Last Dose     cyanocobalamin injection 1,000 mcg  1,000 mcg Intramuscular Q30 Days Lalit Parada MD   1,000 mcg at 01/24/19 0811                      Lalit Parada MD  Internal medicine  [facility]  [phone]  [email or web address]    Much or all of the text in this note was generated through the use of Dragon Dictate voice-to-text software. Errors in spelling or words which seem out of context are unintentional.   Sound alike errors, in particular, may have escaped editing.                 Subjective:   Chief Complaint:  Follow-up (3 mo f/u-pt would like a B12 injection-pt has a question about a paste that he uses made with majauan leaves [place] from [place] and he puts on his back and when he uses it on his back-he states that it helps with his back for about 5 hours, but when he uses it with the gabapentin it can make him dizzy)    Patient has peripheral neuropathy.  He also has allergy.  He is using a over-the-counter cannabinoid which helps  Recent gabapentin was increased to 600 mg at bedtime by neurology after reviewing EMG.  He does have a little discomfort in his low the ankles.  Problem with his hands.  He has been alternating 300/600.  Still suicidal that 400.    He is on helmp based OTC legal product.  There is no CNS issue    Hypertension-There are no cardiovascular, respiratory, neurologic complaints.No claudication.There is no orthostasis.Patient is compliant with medications.  Medications reviewed.   No side effects from medication.      GERD-well controlled with PPI despite him taking orange juice daily  No  abdominal pain  Recent drainage  With endoscopic ultrasound of pancreatic cyst.  If cytology negative.  Reviewed hospital note    Review of Systems:     Extensive 10-point review of systems was performed. Please see the HPI for problem specific pertinent review of systems.     Patient does note appetite good.  Bowels regular    Otherwise, the following systems are noncontributory including constitutional, eyes, ears, nose and throat, cardiovascular, respiratory, gastrointestinal, genitourinary, musculoskeletal,neurological, skin and/or breast, endocrine, hematologic/lymph, allergic/immunologic and psychiatric.              Medications:  Current Outpatient Medications on File Prior to Visit   Medication Sig     aspirin 81 MG EC tablet Take 81 mg by mouth daily.     cyanocobalamin, vitamin B-12, (B-12 COMPLIANCE) 1,000 mcg/mL Kit Inject 1 mL into the shoulder, thigh, or buttocks.     diclofenac (VOLTAREN) 75 MG EC tablet Take 1 tablet by mouth daily as needed.     gabapentin (NEURONTIN) 300 MG capsule Take 300 mg by mouth at bedtime.           glucosamine-chondroitin 500-400 mg cap Take 1 capsule by mouth 2 (two) times a day.     losartan-hydrochlorothiazide (HYZAAR) 50-12.5 mg per tablet Take 1 tablet by mouth daily.     multivitamin with minerals (THERA-M) 9 mg iron-400 mcg Tab tablet Take 1 tablet by mouth daily.     omeprazole (PRILOSEC) 20 MG capsule Take 20 mg by mouth daily.     traMADol (ULTRAM) 50 mg tablet Take 50 mg by mouth as needed for pain.     [DISCONTINUED] DULoxetine (CYMBALTA) 30 MG capsule Take 30 mg by mouth daily For neuropathy.     No current facility-administered medications on file prior to visit.             Allergies:  Allergies   Allergen Reactions     Morphine      Causes upset stomach and pain;     Ciprofloxacin Other (See Comments)     Codeine        PSFHx: Tobacco Status:  He  reports that  has never smoked. he has never used smokeless tobacco.   Alcohol Status:    Social History  "    Substance and Sexual Activity   Alcohol Use Yes    Comment: occasionally, beer       reports that  has never smoked. he has never used smokeless tobacco. He reports that he drinks alcohol. He reports that he does not use drugs.    Objective:    /72   Pulse 78   Ht 5' 4.5\" (1.638 m)   Wt 201 lb (91.2 kg)   SpO2 95% Comment: LEROY  BMI 33.97 kg/m    Weight:   Wt Readings from Last 3 Encounters:   01/24/19 201 lb (91.2 kg)   12/21/18 201 lb (91.2 kg)   12/14/18 201 lb (91.2 kg)     BP Readings from Last 10 Encounters:   01/24/19 108/72   12/21/18 146/71   12/14/18 120/80   10/15/18 132/76   07/26/18 128/74   06/21/18 122/70   04/25/18 126/78   10/05/17 124/82   12/15/16 136/80   09/29/16 128/74         General-appears well, no acute distress.    Pulses regular  Neck no adenopathy  Lungs clear   Cardiac regular no murmur  Extremities no edema  Good pulses gait normal    Review of clinical lab tests  Lab Results   Component Value Date    WBC 8.9 10/15/2018    HGB 13.9 (L) 10/15/2018    HCT 41.3 10/15/2018     10/15/2018    ALT 55 (H) 10/15/2018    AST 30 10/15/2018     10/15/2018    K 3.5 12/14/2018     10/15/2018    CREATININE 1.42 (H) 10/15/2018    BUN 22 10/15/2018    CO2 25 10/15/2018    TSH 1.79 12/11/2018    PSA 1.0 03/15/2016    INR 1.31 (H) 06/14/2009    HGBA1C 6.0 04/25/2018       Glucose   Date/Time Value Ref Range Status   12/11/2018 07:25  70 - 125 mg/dL Final   10/15/2018 08:37  70 - 125 mg/dL Final   07/26/2018 08:35  70 - 125 mg/dL Final   04/25/2018 08:53 AM 79 70 - 125 mg/dL Final   12/15/2016 08:27  74 - 125 mg/dL Final   09/29/2016 09:06  74 - 125 mg/dL Final   03/15/2016 09:05 AM 84 70 - 125 mg/dL Final   11/19/2015 01:50  74 - 125 mg/dL Final     No results found for this or any previous visit (from the past 24 hour(s)).    RADIOLOGY: No results found.    Review of recent consultation-reviewed Dr. Irving Duong neurology note.  " Reviewed EMG

## 2021-06-27 NOTE — PROGRESS NOTES
Progress Notes by Lalit Parada MD at 12/12/2018  4:28 PM     Author: Lalit Parada MD Service: -- Author Type: Physician    Filed: 12/12/2018  4:28 PM Encounter Date: 12/12/2018 Status: Signed    : Lalit Parada MD (Physician)

## 2021-07-03 NOTE — ADDENDUM NOTE
Addendum Note by Josi Keyes MLT at 1/24/2019  8:00 AM     Author: Josi Keyes MLT Service: -- Author Type:     Filed: 1/24/2019  1:21 PM Encounter Date: 1/24/2019 Status: Signed    : Josi Keyes MLT ()    Addended by: JOSI KEYES on: 1/24/2019 01:21 PM        Modules accepted: Orders

## 2021-07-06 ENCOUNTER — AMBULATORY - HEALTHEAST (OUTPATIENT)
Dept: NURSING | Facility: CLINIC | Age: 76
End: 2021-07-06

## 2021-07-09 ENCOUNTER — TRANSCRIBE ORDERS (OUTPATIENT)
Dept: INTERNAL MEDICINE | Facility: CLINIC | Age: 76
End: 2021-07-09

## 2021-07-09 DIAGNOSIS — D51.0 PERNICIOUS ANEMIA: Primary | ICD-10-CM

## 2021-07-09 RX ORDER — CYANOCOBALAMIN 1000 UG/ML
1000 INJECTION, SOLUTION INTRAMUSCULAR; SUBCUTANEOUS
Status: ACTIVE | OUTPATIENT
Start: 2020-11-16 | End: 2021-11-10

## 2021-07-09 NOTE — PROGRESS NOTES
As part of the required manual data conversion process for integration, this encounter was created to document a CAM (Clinic Administered Medication) order. This information was copied from the [facility] patient's chart to the [facility] patient chart.     Pati Leonardo, Gela  July 9, 2021

## 2021-07-26 ENCOUNTER — TRANSFERRED RECORDS (OUTPATIENT)
Dept: HEALTH INFORMATION MANAGEMENT | Facility: CLINIC | Age: 76
End: 2021-07-26

## 2021-08-03 ENCOUNTER — ALLIED HEALTH/NURSE VISIT (OUTPATIENT)
Dept: FAMILY MEDICINE | Facility: CLINIC | Age: 76
End: 2021-08-03
Payer: MEDICARE

## 2021-08-03 DIAGNOSIS — E53.8 VITAMIN B12 DEFICIENCY (NON ANEMIC): Primary | ICD-10-CM

## 2021-08-03 PROCEDURE — 96372 THER/PROPH/DIAG INJ SC/IM: CPT | Performed by: PHARMACIST

## 2021-08-03 PROCEDURE — 99207 PR NO CHARGE NURSE ONLY: CPT

## 2021-08-03 RX ADMIN — CYANOCOBALAMIN 1000 MCG: 1000 INJECTION, SOLUTION INTRAMUSCULAR; SUBCUTANEOUS at 08:55

## 2021-09-07 ENCOUNTER — ALLIED HEALTH/NURSE VISIT (OUTPATIENT)
Dept: FAMILY MEDICINE | Facility: CLINIC | Age: 76
End: 2021-09-07
Payer: MEDICARE

## 2021-09-07 DIAGNOSIS — I10 ESSENTIAL (PRIMARY) HYPERTENSION: ICD-10-CM

## 2021-09-07 DIAGNOSIS — E53.8 VITAMIN B12 DEFICIENCY (NON ANEMIC): Primary | ICD-10-CM

## 2021-09-07 PROCEDURE — 96372 THER/PROPH/DIAG INJ SC/IM: CPT | Performed by: PHARMACIST

## 2021-09-07 RX ORDER — LOSARTAN POTASSIUM AND HYDROCHLOROTHIAZIDE 12.5; 5 MG/1; MG/1
TABLET ORAL
Qty: 90 TABLET | Refills: 3 | Status: SHIPPED | OUTPATIENT
Start: 2021-09-07 | End: 2021-10-26

## 2021-09-07 RX ADMIN — CYANOCOBALAMIN 1000 MCG: 1000 INJECTION, SOLUTION INTRAMUSCULAR; SUBCUTANEOUS at 09:16

## 2021-09-07 NOTE — TELEPHONE ENCOUNTER
"  Disp Refills Start End LEVON    losartan-hydrochlorothiazide (HYZAAR) 50-12.5 mg per tablet 90 tablet 3 11/20/2020  No   Sig - Route: Take 1 tablet by mouth daily. - Oral   Sent to pharmacy as: losartan 50 mg-hydrochlorothiazide 12.5 mg tablet (HYZAAR)   E-Prescribing Status: Receipt confirmed by pharmacy (11/20/2020  9:14 AM CST)   losartan-hydrochlorothiazide (HYZAAR) 50-12.5 mg per tablet [[id number]]    Electronically signed by: Jf Lee MD on 11/20/20 0914 Status: Active   Ordering user: Jf Lee MD 11/20/20 0914 Authorized by: Jf Lee MD   Frequency: DAILY 11/20/20 - Until Discontinued Released by: Jf Lee MD 11/20/20 0914   Diagnoses  Essential hypertension [I10]       Routing refill request to provider for review/approval because:  Labs out of range:  Creatinine  Early refill request     Last Written Prescription Date:  11/20/20  Last Fill Quantity: 90,  # refills: 3   Last office visit provider:  4/21/21     Requested Prescriptions   Pending Prescriptions Disp Refills     losartan-hydrochlorothiazide (HYZAAR) 50-12.5 MG tablet [Pharmacy Med Name: LOSARTAN-HCTZ 50-12.5 MG TAB] 90 tablet 3     Sig: TAKE 1 TABLET BY MOUTH EVERY DAY       Angiotensin-II Receptors Failed - 9/7/2021 12:20 AM        Failed - Normal serum creatinine on file in past 12 months     Recent Labs   Lab Test 11/20/20  0929   CR 1.35*       Ok to refill medication if creatinine is low          Passed - Last blood pressure under 140/90 in past 12 months     BP Readings from Last 3 Encounters:   04/21/21 124/70   03/23/21 (!) 146/80   11/20/20 126/64                 Passed - Recent (12 mo) or future (30 days) visit within the authorizing provider's specialty     Patient has had an office visit with the authorizing provider or a provider within the authorizing providers department within the previous 12 mos or has a future within next 30 days. See \"Patient Info\" tab in inbasket, or \"Choose " "Columns\" in Meds & Orders section of the refill encounter.              Passed - Medication is active on med list        Passed - Patient is age 18 or older        Passed - Normal serum potassium on file in past 12 months     Recent Labs   Lab Test 11/20/20  0929   POTASSIUM 4.1                   Diuretics (Including Combos) Protocol Failed - 9/7/2021 12:20 AM        Failed - Normal serum creatinine on file in past 12 months     Recent Labs   Lab Test 11/20/20  0929   CR 1.35*              Passed - Blood pressure under 140/90 in past 12 months     BP Readings from Last 3 Encounters:   04/21/21 124/70   03/23/21 (!) 146/80   11/20/20 126/64                 Passed - Recent (12 mo) or future (30 days) visit within the authorizing provider's specialty     Patient has had an office visit with the authorizing provider or a provider within the authorizing providers department within the previous 12 mos or has a future within next 30 days. See \"Patient Info\" tab in inbasket, or \"Choose Columns\" in Meds & Orders section of the refill encounter.              Passed - Medication is active on med list        Passed - Patient is age 18 or older        Passed - Normal serum potassium on file in past 12 months     Recent Labs   Lab Test 11/20/20  0929   POTASSIUM 4.1                    Passed - Normal serum sodium on file in past 12 months     Recent Labs   Lab Test 11/20/20  0929                      Fidel Cobb RN 09/07/21 8:35 AM  "

## 2021-09-30 ENCOUNTER — TRANSFERRED RECORDS (OUTPATIENT)
Dept: HEALTH INFORMATION MANAGEMENT | Facility: CLINIC | Age: 76
End: 2021-09-30

## 2021-10-04 ENCOUNTER — TELEPHONE (OUTPATIENT)
Dept: NEUROLOGY | Facility: CLINIC | Age: 76
End: 2021-10-04

## 2021-10-04 DIAGNOSIS — M79.2 NEUROPATHIC PAIN: Primary | ICD-10-CM

## 2021-10-04 NOTE — TELEPHONE ENCOUNTER
Lucy Umanzor Mickey's spouse called and wanted to discuss possibly changing his gabapentin to something else as the gabapentin ford not seem to be helping. Her call back is [phone]

## 2021-10-05 ENCOUNTER — ALLIED HEALTH/NURSE VISIT (OUTPATIENT)
Dept: FAMILY MEDICINE | Facility: CLINIC | Age: 76
End: 2021-10-05
Payer: MEDICARE

## 2021-10-05 DIAGNOSIS — D64.9 ANEMIA: Primary | ICD-10-CM

## 2021-10-05 PROCEDURE — 96372 THER/PROPH/DIAG INJ SC/IM: CPT | Performed by: PHARMACIST

## 2021-10-05 PROCEDURE — 99207 PR NO CHARGE NURSE ONLY: CPT

## 2021-10-05 RX ADMIN — CYANOCOBALAMIN 1000 MCG: 1000 INJECTION, SOLUTION INTRAMUSCULAR; SUBCUTANEOUS at 09:07

## 2021-10-06 NOTE — TELEPHONE ENCOUNTER
Lucy Umanzor did call again today asking status of message from earlier this week. Call back is [phone]

## 2021-10-07 RX ORDER — TOPIRAMATE 25 MG/1
25 TABLET, FILM COATED ORAL 2 TIMES DAILY
Qty: 180 TABLET | Refills: 0 | Status: SHIPPED | OUTPATIENT
Start: 2021-10-07 | End: 2021-10-26

## 2021-10-07 NOTE — TELEPHONE ENCOUNTER
Called and spoke with Julianne. Pt has been taking 200 mg BID instead of 100 mg BID of Gabapentin. They are concerned with side effects of topamax, as pt has already been feeling drowsy and tired with Gabapentin. They are also concerned with side effects of diarrhea as pt already has stomach issues. Pt and spouse would be more comfortable with taking one or the other and would like your advice on this.   If sooner appointment is needed, please provide day and time.     Lina Palma MA on 10/7/2021 at 2:37 PM

## 2021-10-08 NOTE — TELEPHONE ENCOUNTER
Spoke with spouse, Lucy Umanzor. Notified her of provider note. She has questions in regards to whether or not gabapentin may be less effective after cortisone shots or b12 shots. I did inform pt, this should not cause medication to be less effective. Pt is feeling better today, but agreed to try Topamax and Gabapentin together. All other questions in regards to medications and side effects/effectiveness were redirected to pharmacist. She is aware if pt is having side effects, to discontinue medication and call to schedule an appointment. She is also aware that if pt is feeling better with just the gabapentin, he does not need to start topamax. No further questions.     Lina Palma MA on 10/8/2021 at 12:47 PM

## 2021-10-08 NOTE — TELEPHONE ENCOUNTER
Topamax is supposed to be a adjuvant. It is not effective by itself. I would recommend trying. If he has side effects then stop it and schedule apt.

## 2021-10-10 ENCOUNTER — HEALTH MAINTENANCE LETTER (OUTPATIENT)
Age: 76
End: 2021-10-10

## 2021-10-11 ENCOUNTER — TELEPHONE (OUTPATIENT)
Dept: NEUROLOGY | Facility: CLINIC | Age: 76
End: 2021-10-11

## 2021-10-11 DIAGNOSIS — G62.9 NEUROPATHY: ICD-10-CM

## 2021-10-11 RX ORDER — GABAPENTIN 100 MG/1
CAPSULE ORAL
Qty: 360 CAPSULE | Refills: 3 | Status: SHIPPED | OUTPATIENT
Start: 2021-10-11 | End: 2022-09-27

## 2021-10-11 NOTE — TELEPHONE ENCOUNTER
Pt and spouse notified of provider note.     Refill request with corrected sig for gabapentin 100 mg   Medication T'd for review and signature  Lina Palma MA on 10/11/2021 at 3:20 PM

## 2021-10-11 NOTE — TELEPHONE ENCOUNTER
Spoke with spouse, she mentioned Mickey has been taking both medications together, but he is still taking more gabapentin than prescribed.     Pt takes 25 mg topiramate with 100 mg gabapentin around 9AM. Pain is normally worse by 6PM so he takes 25 mg topiramate with 200 mg gabapentin. He takes an additional 100 mg gabapentin at 9PM. Pt has had relieve with this regimen, they are aware he is taking more gabapentin than prescribed. Please advise on med/dosage.    Lina Palma MA on 10/11/2021 at 2:57 PM

## 2021-10-11 NOTE — TELEPHONE ENCOUNTER
Please call Kari at [phone]. She has some questions on the dosing and possibly changing for his topiramte and Gabapentin and when to give them? She was explaining to me, but I asked her to ask nurse when you call her back.

## 2021-10-26 ENCOUNTER — OFFICE VISIT (OUTPATIENT)
Dept: INTERNAL MEDICINE | Facility: CLINIC | Age: 76
End: 2021-10-26
Payer: MEDICARE

## 2021-10-26 VITALS
OXYGEN SATURATION: 96 % | DIASTOLIC BLOOD PRESSURE: 76 MMHG | BODY MASS INDEX: 33.11 KG/M2 | SYSTOLIC BLOOD PRESSURE: 116 MMHG | WEIGHT: 195.9 LBS | HEART RATE: 75 BPM | TEMPERATURE: 98.3 F

## 2021-10-26 DIAGNOSIS — Z23 HIGH PRIORITY FOR 2019-NCOV VACCINE: ICD-10-CM

## 2021-10-26 DIAGNOSIS — M48.062 SPINAL STENOSIS, LUMBAR REGION, WITH NEUROGENIC CLAUDICATION: Primary | ICD-10-CM

## 2021-10-26 DIAGNOSIS — R73.03 PREDIABETES: ICD-10-CM

## 2021-10-26 DIAGNOSIS — D51.0 PERNICIOUS ANEMIA: ICD-10-CM

## 2021-10-26 DIAGNOSIS — I10 ESSENTIAL (PRIMARY) HYPERTENSION: ICD-10-CM

## 2021-10-26 PROBLEM — K21.9 GASTROESOPHAGEAL REFLUX DISEASE: Status: ACTIVE | Noted: 2021-10-26

## 2021-10-26 PROBLEM — E66.9 OBESITY: Status: ACTIVE | Noted: 2021-10-26

## 2021-10-26 LAB
ANION GAP SERPL CALCULATED.3IONS-SCNC: 14 MMOL/L (ref 5–18)
BUN SERPL-MCNC: 19 MG/DL (ref 8–28)
CALCIUM SERPL-MCNC: 10 MG/DL (ref 8.5–10.5)
CHLORIDE BLD-SCNC: 102 MMOL/L (ref 98–107)
CO2 SERPL-SCNC: 23 MMOL/L (ref 22–31)
CREAT SERPL-MCNC: 1.41 MG/DL (ref 0.7–1.3)
GFR SERPL CREATININE-BSD FRML MDRD: 48 ML/MIN/1.73M2
GLUCOSE BLD-MCNC: 94 MG/DL (ref 70–125)
HBA1C MFR BLD: 5.8 % (ref 0–5.6)
POTASSIUM BLD-SCNC: 3.5 MMOL/L (ref 3.5–5)
SODIUM SERPL-SCNC: 139 MMOL/L (ref 136–145)

## 2021-10-26 PROCEDURE — 99214 OFFICE O/P EST MOD 30 MIN: CPT | Mod: 25 | Performed by: INTERNAL MEDICINE

## 2021-10-26 PROCEDURE — 91300 COVID-19,PF,PFIZER (12+ YRS): CPT | Performed by: INTERNAL MEDICINE

## 2021-10-26 PROCEDURE — 0004A COVID-19,PF,PFIZER (12+ YRS): CPT | Performed by: INTERNAL MEDICINE

## 2021-10-26 PROCEDURE — 36415 COLL VENOUS BLD VENIPUNCTURE: CPT | Performed by: INTERNAL MEDICINE

## 2021-10-26 PROCEDURE — 83036 HEMOGLOBIN GLYCOSYLATED A1C: CPT | Performed by: INTERNAL MEDICINE

## 2021-10-26 PROCEDURE — 80048 BASIC METABOLIC PNL TOTAL CA: CPT | Performed by: INTERNAL MEDICINE

## 2021-10-26 RX ORDER — TRAMADOL HYDROCHLORIDE 50 MG/1
50 TABLET ORAL EVERY 6 HOURS PRN
Qty: 30 TABLET | Refills: 0 | Status: SHIPPED | OUTPATIENT
Start: 2021-10-26 | End: 2022-10-27

## 2021-10-26 RX ORDER — LOSARTAN POTASSIUM AND HYDROCHLOROTHIAZIDE 12.5; 5 MG/1; MG/1
1 TABLET ORAL DAILY
Qty: 90 TABLET | Refills: 3 | Status: SHIPPED | OUTPATIENT
Start: 2021-10-26 | End: 2022-12-30

## 2021-10-26 RX ORDER — CYANOCOBALAMIN 1000 UG/ML
1000 INJECTION, SOLUTION INTRAMUSCULAR; SUBCUTANEOUS
Status: DISCONTINUED | OUTPATIENT
Start: 2021-10-26 | End: 2024-09-19

## 2021-10-26 NOTE — PROGRESS NOTES
Office Visit - Follow Up   Mickey Desouza   76 year old male    Date of Visit: 10/26/2021    Chief Complaint   Patient presents with     RECHECK     Imm/Inj     COVID-19 VACCINE        -------------------------------------------------------------------------------------------------------------------------  Assessment and Plan    Followup multiple issues  75-year-old man, retired  and     Lumbar spinal stenosis, for which has been getting epidural steroid injections approximately twice a year.    He recalls last epidural steroid injection was in June 2021, which gave him relief for a couple months, but he is hurting again.    He is working with a chiropractor twice a week  But I think he might benefit from some additional physical therapy.  [facility] would like to [facility] [place], slight entered external referral.    He told me that he might use 1 or 2 tramadol tablets a week for his back.  I reminded him to minimize the use of the tramadol, because it can contribute to constipation.  I will renew his prescription today for 30 tablets.    Pancreatic cyst, he is scheduled to go surveillance upper endoscopy ultrasound on November 11, 2021, to be done by Dr. Fidel baer, and will have a preprocedure medical exam on November 5, 2021, and that is to include a preprocedure Covid test.  History of recurrent bouts of pancreatitis 1989, 1990, 96, 2008, with a 1.1 x 1.9 cm pancreatic cyst most recently imaged by CT October 19, 2018, stable in size.  He told me that alcohol consumption is 0.    Resolved 1 to 2-second episodes from April 2021 of blurry vision, lightheadedness, pressure behind eyes, which I wonder whether this might be symptoms of sinus congestion and irritation; long history of seasonal allergic rhinitis      Peripheral neuropathy with numbness in his feet, also restless leg syndrome, has been working with neurologist Dr. Gallegos, and is currently on gabapentin 200 mg taken twice  each evening at 6PM and 9 PM    He has undergone EMG which demonstrated neuropathy.  Laboratory work did not identify an underlying cause.  He experiences pins-and-needles dysesthesias.  Ferritin level was normal.  Past trial of Cymbalta did not produce much relief.     He does find gabapentin helpful and has found a dose that seems to give him adequate relief at 200 mg at 6 PM, and 200 mg at 9 PM.    Trial of topiramate was unsuccessful because of intolerable side effects of sleepiness, and therefore discontinued yesterday October 25, 2021     Essential hypertension, blood pressure nicely controlled on combination of losartan 50 mg a day with hydrochlorothiazide 12.5 mg a day.  Goal blood pressure is 120/80.    lipids good, no medicine November 2020  Cholesterol <=199 mg/dL 157      Triglycerides <=149 mg/dL 133    Direct Measure HDL >=40 mg/dL 44    LDL Cholesterol Calculated <=129 mg/dL 86      Chronic kidney disease stage IIIa with GFR of 51 and creatinine of approximately 1.35 most recently measured November 20, 2020.  The losartan component of his blood pressure medicine will help protect his kidneys for the long run.  There are certain medications he needs to avoid because of his kidneys, most notably the nonsteroidal anti-inflammatory drug such as ibuprofen and naproxen.  Tylenol (acetaminophen) is okay to use.     Benign prostatic hyperplasia with urinary frequency, Flomax was ineffective, PSA was quite satisfactory only 1.0 measured November 20, 2020.  He told me that he empties his bladder completely, and that is the most important thing.     Prediabetes in the context of obesity, A1c was 6.0 when measured April 2018.    Obesity with body mass index of 34.3.  I would like to see him lose 20 pounds this year.  I reminded him about the importance of eating slower, controlling portion size, and identifying problem foods to curtail or eliminate, especially starches, sweets, fried foods.    Chronic neck pain.   Similar to his back, he needs to work on strengthening her upper back muscles also deep neck muscles, sleep on a properly supportive pillow, and maintain mobility and flexibility.     History of Norton's esophagus negative for dysplasia on most recent endoscopy October 28, 2020.  Recheck 3 years after that which would be October 2023.  Maintained on omeprazole.     History of pernicious anemia taking vitamin B12 injections monthly  normal blood cell counts November 20, 2020.  Normal vitamin B12 level as well.     Status post right total knee arthroplasty, initially 2004, redo in 2009, now doing well.     Status post cataract surgery both eyes 2018, dry eye syndrome recently prescribed Restasis drops.     Received his second shot of Pfizer Covid vaccine March 5, 2021, third shot today October 26, 2021     Constipation, he told me that he might go 2 to 3 days without a bowel movement.  I reminded him to minimize the tramadol opioid.  He asked about Linzess, but I think he needs to first go with the first line management which would be MiraLAX (polyethylene glycol powder).  I told him that he could take 1 capful of MiraLAX powder a day, diluted into his favorite beverage.     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    Immunization History   Administered Date(s) Administered     COVID-19,PF,Pfizer 02/12/2021, 03/05/2021     Td (Adult), Adsorbed 01/01/2006     Tdap (Adacel,Boostrix) 08/17/2011       --------------------------------------------------------------------------------------------------------------------------  History of Present Illness  This 76 year old old     Followup multiple issues  75-year-old man, retired  and     Lumbar spinal stenosis, for which has been getting epidural steroid injections approximately twice a year.    He recalls last epidural steroid injection was in June  2021, which gave him relief for a couple months, but he is hurting again.    He is working with a chiropractor twice a week  But I think he might benefit from some additional physical therapy.  [facility] would like to [facility] in [place], slight entered external referral.    He told me that he might use 1 or 2 tramadol tablets a week for his back.  I reminded him to minimize the use of the tramadol, because it can contribute to constipation.  I will renew his prescription today for 30 tablets.    Pancreatic cyst, he is scheduled to go surveillance upper endoscopy ultrasound on November 11, 2021, to be done by Dr. Fidel baer, and will have a preprocedure medical exam on November 5, 2021, and that is to include a preprocedure Covid test.  History of recurrent bouts of pancreatitis 1989, 1990, 96, 2008, with a 1.1 x 1.9 cm pancreatic cyst most recently imaged by CT October 19, 2018, stable in size.  He told me that alcohol consumption is 0.    Resolved 1 to 2-second episodes from April 2021 of blurry vision, lightheadedness, pressure behind eyes, which I wonder whether this might be symptoms of sinus congestion and irritation; long history of seasonal allergic rhinitis      Peripheral neuropathy with numbness in his feet, also restless leg syndrome, has been working with neurologist Dr. Gallegos, and is currently on gabapentin 200 mg taken twice each evening at 6PM and 9 PM    He has undergone EMG which demonstrated neuropathy.  Laboratory work did not identify an underlying cause.  He experiences pins-and-needles dysesthesias.  Ferritin level was normal.  Past trial of Cymbalta did not produce much relief.     He does find gabapentin helpful and has found a dose that seems to give him adequate relief at 200 mg at 6 PM, and 200 mg at 9 PM.    Trial of topiramate was unsuccessful because of intolerable side effects of sleepiness, and therefore discontinued yesterday October 25, 2021      Wt Readings from Last 3  Encounters:   10/26/21 88.9 kg (195 lb 14.4 oz)   04/21/21 92.1 kg (203 lb)   03/23/21 88.5 kg (195 lb)     BP Readings from Last 3 Encounters:   10/26/21 116/76   04/21/21 124/70   03/23/21 (!) 146/80     ---------------------------------------------------------------------------------------------------------------------------    Medications, Allergies, Social, and Problem List   Current Outpatient Medications   Medication Sig Dispense Refill     aspirin (ASA) 81 MG chewable tablet   Instructions: Take 81 mg by mouth daily., Type: Maintenance       gabapentin (NEURONTIN) 100 MG capsule Take 100 mg at 9:00 AM, 200 mg at 6:00 PM, and 100 mg at 9:00 PM (Patient taking differently: 200 mg at 6:00 PM, and 200 mg at 9:00 PM) 360 capsule 3     glucosamine-chondroitin 500-400 MG CAPS per capsule   Instructions: Take 1 capsule by mouth 2 (two) times a day., Type: Maintenance       losartan-hydrochlorothiazide (HYZAAR) 50-12.5 MG tablet TAKE 1 TABLET BY MOUTH EVERY DAY 90 tablet 3     Multiple Vitamins-Minerals (DAILY MULTIVITAMIN PO)   Instructions: Take 1 tablet by mouth daily., Type: Maintenance       omeprazole (PRILOSEC) 20 MG DR capsule Take 20 mg by mouth daily        traMADol (ULTRAM) 50 MG tablet Take 50 mg by mouth every 6 hours as needed        cycloSPORINE (RESTASIS) 0.05 % ophthalmic emulsion        Allergies   Allergen Reactions     Ciprofloxacin Other (See Comments)     Lisinopril Cough     Morphine      Social History     Tobacco Use     Smoking status: Never Smoker     Smokeless tobacco: Never Used   Substance Use Topics     Alcohol use: Not Currently     Comment: Alcoholic Drinks/day: occasionally, beer     Drug use: No     Patient Active Problem List   Diagnosis     Urinary frequency     Stage 3a chronic kidney disease (H)     Restless legs syndrome     Neuropathy     Neuropathic pain     Neck pain, chronic     Hypertension     Allergic rhinitis     Burning sensation of feet        Reviewed, reconciled and  updated       Physical Exam   General Appearance: Appears generally well, blood pressures nicely controlled    /76 (BP Location: Right arm, Patient Position: Right side, Cuff Size: Adult Regular)   Pulse 75   Temp 98.3  F (36.8  C) (Oral)   Wt 88.9 kg (195 lb 14.4 oz)   SpO2 96%   BMI 33.11 kg/m      He is tender when I press over his right lower thoracic upper lumbar spine area.  Lungs clear  Heart regular rate rhythm  Abdomen nontender  Trace ankle edema     Additional Information   I spent 30 minutes on this encounter, including reviewing interval history since last visit, examining the patient, explaining and counseling the issues enumerated in the Assessment and Plan (patient given a copy), ordering indicated tests, ordering prescriptions, ordering referrals.       BORA AC MD, MD

## 2021-10-26 NOTE — PATIENT INSTRUCTIONS
Followup multiple issues  75-year-old man, retired  and     Lumbar spinal stenosis, for which has been getting epidural steroid injections approximately twice a year.    He recalls last epidural steroid injection was in June 2021, which gave him relief for a couple months, but he is hurting again.    He is working with a chiropractor twice a week  But I think he might benefit from some additional physical therapy.  [facility] would like to [facility] in [place], slight entered external referral.    He told me that he might use 1 or 2 tramadol tablets a week for his back.  I reminded him to minimize the use of the tramadol, because it can contribute to constipation.  I will renew his prescription today for 30 tablets.    Pancreatic cyst, he is scheduled to go surveillance upper endoscopy ultrasound on November 11, 2021, to be done by Dr. Fidel baer, and will have a preprocedure medical exam on November 5, 2021, and that is to include a preprocedure Covid test.  History of recurrent bouts of pancreatitis 1989, 1990, 96, 2008, with a 1.1 x 1.9 cm pancreatic cyst most recently imaged by CT October 19, 2018, stable in size.  He told me that alcohol consumption is 0.    Resolved 1 to 2-second episodes from April 2021 of blurry vision, lightheadedness, pressure behind eyes, which I wonder whether this might be symptoms of sinus congestion and irritation; long history of seasonal allergic rhinitis      Peripheral neuropathy with numbness in his feet, also restless leg syndrome, has been working with neurologist Dr. Gallegos, and is currently on gabapentin 200 mg taken twice each evening at 6PM and 9 PM    He has undergone EMG which demonstrated neuropathy.  Laboratory work did not identify an underlying cause.  He experiences pins-and-needles dysesthesias.  Ferritin level was normal.  Past trial of Cymbalta did not produce much relief.     He does find gabapentin helpful and has found a dose that  seems to give him adequate relief at 200 mg at 6 PM, and 200 mg at 9 PM.    Trial of topiramate was unsuccessful because of intolerable side effects of sleepiness, and therefore discontinued yesterday October 25, 2021     Essential hypertension, blood pressure nicely controlled on combination of losartan 50 mg a day with hydrochlorothiazide 12.5 mg a day.  Goal blood pressure is 120/80.    lipids good, no medicine November 2020  Cholesterol <=199 mg/dL 157      Triglycerides <=149 mg/dL 133    Direct Measure HDL >=40 mg/dL 44    LDL Cholesterol Calculated <=129 mg/dL 86      Chronic kidney disease stage IIIa with GFR of 51 and creatinine of approximately 1.35 most recently measured November 20, 2020.  The losartan component of his blood pressure medicine will help protect his kidneys for the long run.  There are certain medications he needs to avoid because of his kidneys, most notably the nonsteroidal anti-inflammatory drug such as ibuprofen and naproxen.  Tylenol (acetaminophen) is okay to use.     Benign prostatic hyperplasia with urinary frequency, Flomax was ineffective, PSA was quite satisfactory only 1.0 measured November 20, 2020.  He told me that he empties his bladder completely, and that is the most important thing.     Prediabetes in the context of obesity, A1c was 6.0 when measured April 2018.    Obesity with body mass index of 34.3.  I would like to see him lose 20 pounds this year.  I reminded him about the importance of eating slower, controlling portion size, and identifying problem foods to curtail or eliminate, especially starches, sweets, fried foods.    Chronic neck pain.  Similar to his back, he needs to work on strengthening her upper back muscles also deep neck muscles, sleep on a properly supportive pillow, and maintain mobility and flexibility.     History of Norton's esophagus negative for dysplasia on most recent endoscopy October 28, 2020.  Recheck 3 years after that which would be  October 2023.  Maintained on omeprazole.     History of pernicious anemia taking vitamin B12 injections monthly  normal blood cell counts November 20, 2020.  Normal vitamin B12 level as well.     Status post right total knee arthroplasty, initially 2004, redo in 2009, now doing well.     Status post cataract surgery both eyes 2018, dry eye syndrome recently prescribed Restasis drops.     Received his second shot of Pfizer Covid vaccine March 5, 2021, third shot today October 26, 2021     Constipation, he told me that he might go 2 to 3 days without a bowel movement.  I reminded him to minimize the tramadol opioid.  He asked about Linzess, but I think he needs to first go with the first line management which would be MiraLAX (polyethylene glycol powder).  I told him that he could take 1 capful of MiraLAX powder a day, diluted into his favorite beverage.     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

## 2021-10-27 ENCOUNTER — TELEPHONE (OUTPATIENT)
Dept: INTERNAL MEDICINE | Facility: CLINIC | Age: 76
End: 2021-10-27

## 2021-10-27 NOTE — TELEPHONE ENCOUNTER
Reason for Call:  Orders    Detailed comments: Pt was seen yesterday, wondering about the vitamin b12 order since it is his 12month injection. Can that be put in so patient can possiblt be seen next Tuesday? Please advise.    Phone Number Patient can be reached at: Home number on file [phone] (home)    Best Time: ANY    Can we leave a detailed message on this number? YES    Call taken on 10/27/2021 at 9:07 AM by Theo Sheriff

## 2021-10-27 NOTE — TELEPHONE ENCOUNTER
Patient's order is active and patient is scheduled for a B12 in [place] on 11/2/21. Wife notified. Nothing needed.  Lisa Barkley CMA ............... 11:45 AM, 10/27/21

## 2021-11-02 ENCOUNTER — ALLIED HEALTH/NURSE VISIT (OUTPATIENT)
Dept: FAMILY MEDICINE | Facility: CLINIC | Age: 76
End: 2021-11-02
Payer: MEDICARE

## 2021-11-02 DIAGNOSIS — E53.8 VITAMIN B12 DEFICIENCY (NON ANEMIC): Primary | ICD-10-CM

## 2021-11-02 PROCEDURE — 99207 PR NO CHARGE NURSE ONLY: CPT

## 2021-11-02 PROCEDURE — 96372 THER/PROPH/DIAG INJ SC/IM: CPT | Performed by: PHARMACIST

## 2021-11-02 RX ADMIN — CYANOCOBALAMIN 1000 MCG: 1000 INJECTION, SOLUTION INTRAMUSCULAR; SUBCUTANEOUS at 09:10

## 2021-11-05 ENCOUNTER — OFFICE VISIT (OUTPATIENT)
Dept: INTERNAL MEDICINE | Facility: CLINIC | Age: 76
End: 2021-11-05
Payer: MEDICARE

## 2021-11-05 VITALS
HEART RATE: 74 BPM | SYSTOLIC BLOOD PRESSURE: 118 MMHG | HEIGHT: 65 IN | BODY MASS INDEX: 33.07 KG/M2 | DIASTOLIC BLOOD PRESSURE: 76 MMHG | WEIGHT: 198.5 LBS

## 2021-11-05 DIAGNOSIS — N18.31 STAGE 3A CHRONIC KIDNEY DISEASE (H): ICD-10-CM

## 2021-11-05 DIAGNOSIS — D51.0 PERNICIOUS ANEMIA: ICD-10-CM

## 2021-11-05 DIAGNOSIS — K22.719 BARRETT'S ESOPHAGUS WITH DYSPLASIA: ICD-10-CM

## 2021-11-05 DIAGNOSIS — R73.03 PREDIABETES: ICD-10-CM

## 2021-11-05 DIAGNOSIS — I10 ESSENTIAL (PRIMARY) HYPERTENSION: ICD-10-CM

## 2021-11-05 DIAGNOSIS — G62.9 POLYNEUROPATHY: ICD-10-CM

## 2021-11-05 DIAGNOSIS — Z01.818 PREOPERATIVE EXAMINATION: Primary | ICD-10-CM

## 2021-11-05 PROCEDURE — 99214 OFFICE O/P EST MOD 30 MIN: CPT | Performed by: NURSE PRACTITIONER

## 2021-11-05 ASSESSMENT — MIFFLIN-ST. JEOR: SCORE: 1552.27

## 2021-11-05 NOTE — PATIENT INSTRUCTIONS
"Hold your vitamins and aspirin starting today.    Take your AM medications with sips of water the morning of your procedure.    Follow up prior to procedure if having new symptoms.   Patient Education   Visiting the Preoperative Assessment Center (PAC)   Your appointment is on: ___________________ (date) _______________ (time)  Pre-surgery visits  As part of your planned surgery, you will meet with an advanced practice provider (NP, CNS or DANNI) at the PAC. They will do an anesthesia (sleep medicine) risk assessment and can also do a \"history and physical\" if needed. You must have a history and physical within 30 days of your surgery.   Your care team may also want you to have other tests before surgery. If so, we will let you know.   A registered nurse (RN) will see you as part of your PAC visit. They will explain how to prepare for surgery and what to expect.  You may also need to meet with a pharmacist. If so, we will ask you to arrive a half hour early.  For your appointment  Please bring the following items, if available:     Any medicine you are taking.    Tests or records from specialty clinics, such as for heart (cardiac) or lung (pulmonary).    Any anesthesia records that may be helpful.  You may fax your information ahead of time to [phone].   Directions  Go to [facility] ([address] [place] [address]).  1. Take [address] to [facility] [place], and merge into the left turn [address].  2. Turn left on [facility]. The [facility] will be on your right.   parking: Enter the driveway from [facility]. There is a fee for each visit. Tips are not expected.  Self-parking: Enter the driveway from [facility]. From there, a  will direct you to the best parking option. To get the best rate, bring your parking ticket with you and pay for parking before you leave the Center.  Questions or concerns?   Please " call [phone] Monday to Friday, 7 a.m. to 7:00 p.m.  Saturday, 8:00 a.m. to 12:00 p.m.  For informational purposes only. Not to replace the advice of your health care provider. Copyright   2013 [employer] [facility]. All rights reserved. Clinicaly reviewed by [facility]. [place] [id number] - REV 08/19.

## 2021-11-05 NOTE — PROGRESS NOTES
[facility]  [address] [address]  Phone: [phone]  Fax: [phone]  Primary Provider: Be Maria  Pre-op Performing Provider: SHAMA YANEZ    PREOPERATIVE EVALUATION:  Today's date: 11/5/2021    Mickey Desouza is a 76 year old male who presents for a preoperative evaluation.    Surgical Information:  Surgery/Procedure: Endoscopic Ultrasound Upper  Surgery Location: [facility]  Surgeon: Dr. Villegas  Surgery Date: 11/11/21  Time of Surgery: Unknown  Where patient plans to recover: At home with family  Fax number for surgical facility: [phone]    Type of Anesthesia Anticipated: Choice    Assessment & Plan     The proposed surgical procedure is considered LOW risk.    Preoperative examination: To have an upper endoscopy done. No EKG Is needed. Creatinine was stable on 10/26 at 1.41. Hold Vitamins and ASA starting today. COVID swab ordered. Follow up prior to surgery if having new symptoms.   - Asymptomatic COVID-19 Virus (Coronavirus) by PCR Nasopharyngeal    Norton's esophagus with dysplasia: Hx of this. Last endoscopy was normal on 10/28/2020. He continues on Omeprazole. Stable.     Essential (primary) hypertension: Blood pressure today was 118/76. He continues on Hyzaar. Stable.     Prediabetes: A1C on 10/26/21 was 5.8%, prediabetes. He continues to work on diet and exercise.     Pernicious anemia: He continues to get monthly B12 injections. Stable.     Polyneuropathy: Bilateral feet, takes gabapentin at bedtime. Stable.     Stage 3a chronic kidney disease (H): Creatinine was 1.41 and EGFR was 48mls/min on 10/26. Stable.         Risks and Recommendations:  The patient has the following additional risks and recommendations for perioperative complications:   - No identified additional risk factors other than previously addressed    Medication Instructions:  Hold Aspirin and Vitamins starting today. Resume after  procedure.    RECOMMENDATION:  APPROVAL GIVEN to proceed with proposed procedure, without further diagnostic evaluation.    Subjective     HPI related to upcoming procedure: The patient presents today for a preoperative exam.    He will be having an upper endoscopy done to look at this esophagus. He has a history of Norton's esophagus. Last Endoscopy in 10/28/2020 was normal. He continues on Omeprazole.    He denies any new issues with swallowing.    He denies any history of a heart attack, or sleep apnea.    He reports that he has a right total knee replacement.    He will need COVID testing done prior, will order this today.    He denies other concerns.     He would like to be a full code.     Preop Questions 11/2/2021   1. Have you ever had a heart attack or stroke? No   2. Have you ever had surgery on your heart or blood vessels, such as a stent placement, a coronary artery bypass, or surgery on an artery in your head, neck, heart, or legs? No   3. Do you have chest pain with activity? No   4. Do you have a history of  heart failure? No   5. Do you currently have a cold, bronchitis or symptoms of other infection? No   6. Do you have a cough, shortness of breath, or wheezing? No   7. Do you or anyone in your family have previous history of blood clots? No   8. Do you or does anyone in your family have a serious bleeding problem such as prolonged bleeding following surgeries or cuts? No   9. Have you ever had problems with anemia or been told to take iron pills? No   10. Have you had any abnormal blood loss such as black, tarry or bloody stools? No   11. Have you ever had a blood transfusion? No   12. Are you willing to have a blood transfusion if it is medically needed before, during, or after your surgery? Yes   13. Have you or any of your relatives ever had problems with anesthesia? No   14. Do you have sleep apnea, excessive snoring or daytime drowsiness? No   15. Do you have any artifical heart valves or  other implanted medical devices like a pacemaker, defibrillator, or continuous glucose monitor? No   16. Do you have artificial joints? YES - Right total knee   17. Are you allergic to latex? No       Health Care Directive:  Patient does not have a Health Care Directive or Living Will: Patient states has Advance Directive and will bring in a copy to clinic. Full Code    Preoperative Review of :   reviewed - no record of controlled substances prescribed.    Review of Systems  Constitutional, neuro, ENT, endocrine, pulmonary, cardiac, gastrointestinal, genitourinary, musculoskeletal, integument and psychiatric systems are negative, except as otherwise noted.    Patient Active Problem List    Diagnosis Date Noted     Gastroesophageal reflux disease 10/26/2021     Priority: Medium     Obesity 10/26/2021     Priority: Medium     Spinal stenosis, lumbar region, with neurogenic claudication 10/26/2021     Priority: Medium     Restless legs syndrome 03/23/2021     Priority: Medium     Neuropathy 03/23/2021     Priority: Medium     Neuropathic pain 03/23/2021     Priority: Medium     Hypertension 03/23/2021     Priority: Medium     Stage 3a chronic kidney disease (H) 06/21/2018     Priority: Medium     Neck pain, chronic 06/17/2014     Priority: Medium     Formatting of this note might be different from the original.  Neck pain, chronic  Sees chiropractor       Burning sensation of feet 09/03/2013     Priority: Medium     Formatting of this note might be different from the original.  Burning sensation of feet ? neuropathy 2008--  B 12 = 312   9-13;       Pancreatic cyst 04/05/2012     Priority: Medium     Formatting of this note might be different from the original.  Pancreatic cyst 2011; EUS  4-12  10-15 mm. Fluid analysis neg for tumor 4-12;  NC on CT  6-13; MRI abd 9-14: panc pseudocyst slt smaller.       Urinary frequency 04/04/2012     Priority: Medium     Formatting of this note might be different from the  original.  Nocturia 5-6 x a night; Urinary frequency  Ua wnl  4-12; BPH  Began cardura 5-12;   inc cardura to 1 mg bid 5-13; does not want to see urologist 8-13;       Allergic rhinitis 04/04/2012     Priority: Medium     Formatting of this note might be different from the original.  Occ. OTC med        Past Medical History:   Diagnosis Date     Allergic sinusitis      Norton esophagus 11/2014     BPH (benign prostatic hyperplasia)      Cancer (H)      Chronic kidney disease     Stage 3     GERD (gastroesophageal reflux disease)      Hypertension 3/23/2021     Hypertension      Neuropathy      Osteoarthritis      Pancreatic cyst      Pancreatitis, recurrent      Past Surgical History:   Procedure Laterality Date     ARTHROSCOPY KNEE Bilateral      CATARACT EXTRACTION Bilateral      CHOLECYSTECTOMY  1995     ESOPHAGOSCOPY, GASTROSCOPY, DUODENOSCOPY (EGD), COMBINED N/A 12/21/2018    Procedure: ENDOSCOPIC ULTRASOUND FINE NEEDLE ASPIRATION;  Surgeon: Fidel Salter MD;  Location: [facility];  Service: Gastroenterology     ESOPHAGOSCOPY, GASTROSCOPY, DUODENOSCOPY (EGD), COMBINED N/A 11/15/2019    Procedure: ENDOSCOPIC ULTRASOUND WITH SMALL BOWEL AND GASTRIC BIOPSY;  Surgeon: Fidel Salter MD;  Location: [facility];  Service: Gastroenterology     JOINT REPLACEMENT Right     TKA     ORTHOPEDIC SURGERY       RELEASE CARPAL TUNNEL Bilateral      RELEASE TRIGGER FINGER Bilateral      Current Outpatient Medications   Medication Sig Dispense Refill     aspirin (ASA) 81 MG chewable tablet   Instructions: Take 81 mg by mouth daily., Type: Maintenance       gabapentin (NEURONTIN) 100 MG capsule Take 100 mg at 9:00 AM, 200 mg at 6:00 PM, and 100 mg at 9:00 PM (Patient taking differently: 200 mg at 6:00 PM, and 200 mg at 9:00 PM) 360 capsule 3     glucosamine-chondroitin 500-400 MG CAPS per capsule   Instructions: Take 1 capsule by mouth 2 (two) times a day., Type: Maintenance       losartan-hydrochlorothiazide  "(HYZAAR) 50-12.5 MG tablet Take 1 tablet by mouth daily 90 tablet 3     Multiple Vitamins-Minerals (DAILY MULTIVITAMIN PO)   Instructions: Take 1 tablet by mouth daily., Type: Maintenance       omeprazole (PRILOSEC) 20 MG DR capsule Take 20 mg by mouth daily        traMADol (ULTRAM) 50 MG tablet Take 1 tablet (50 mg) by mouth every 6 hours as needed for breakthrough pain 30 tablet 0       Allergies   Allergen Reactions     Ciprofloxacin Other (See Comments)     Codeine      Lisinopril Cough     Morphine         Social History     Tobacco Use     Smoking status: Never Smoker     Smokeless tobacco: Never Used   Substance Use Topics     Alcohol use: Not Currently     Comment: Alcoholic Drinks/day: occasionally, beer     Family History   Problem Relation Age of Onset     Coronary Artery Disease Father      Pancreatic Cancer Mother      Breast Cancer Sister      Liver Disease Brother      Other - See Comments Sister         head bleed after fall     Other - See Comments Brother      Neuropathy Brother      Diabetes Brother      Diabetes Brother      Other - See Comments Brother         WWII     No Known Problems Son      History   Drug Use No         Objective     /76   Pulse 74   Ht 1.651 m (5' 5\")   Wt 90 kg (198 lb 8 oz)   BMI 33.03 kg/m      Physical Exam    GENERAL APPEARANCE: healthy, alert and no distress     EYES: EOMI,  PERRL     HENT: ear canals and TM's normal and nose and mouth without ulcers or lesions     NECK: no adenopathy, no asymmetry, masses, or scars and thyroid normal to palpation     RESP: lungs clear to auscultation - no rales, rhonchi or wheezes     CV: regular rates and rhythm, normal S1 S2, no S3 or S4 and no murmur, click or rub     ABDOMEN:  soft, nontender, no HSM or masses and bowel sounds normal     MS: extremities normal- no gross deformities noted, no evidence of inflammation in joints, FROM in all extremities.     SKIN: no suspicious lesions or rashes     NEURO: Normal strength " and tone, sensory exam grossly normal, mentation intact and speech normal     PSYCH: mentation appears normal. and affect normal/bright     LYMPHATICS: No cervical adenopathy    Recent Labs   Lab Test 10/26/21  1437 11/20/20  0929   HGB  --  14.5   PLT  --  218    138   POTASSIUM 3.5 4.1   CR 1.41* 1.35*   A1C 5.8*  --         Diagnostics:  No results found for this or any previous visit (from the past 240 hour(s)).   No EKG required for low risk surgery (cataract, skin procedure, breast biopsy, etc).    Revised Cardiac Risk Index (RCRI):  The patient has the following serious cardiovascular risks for perioperative complications:   - No serious cardiac risks = 0 points     RCRI Interpretation: 0 points: Class I (very low risk - 0.4% complication rate)         Signed Electronically by: Aby Maldonado CNP  Copy of this evaluation report is provided to requesting physician.

## 2021-11-09 ENCOUNTER — LAB (OUTPATIENT)
Dept: LAB | Facility: CLINIC | Age: 76
End: 2021-11-09
Payer: MEDICARE

## 2021-11-09 DIAGNOSIS — Z01.818 PREOPERATIVE EXAMINATION: ICD-10-CM

## 2021-11-09 PROCEDURE — U0005 INFEC AGEN DETEC AMPLI PROBE: HCPCS

## 2021-11-09 PROCEDURE — U0003 INFECTIOUS AGENT DETECTION BY NUCLEIC ACID (DNA OR RNA); SEVERE ACUTE RESPIRATORY SYNDROME CORONAVIRUS 2 (SARS-COV-2) (CORONAVIRUS DISEASE [COVID-19]), AMPLIFIED PROBE TECHNIQUE, MAKING USE OF HIGH THROUGHPUT TECHNOLOGIES AS DESCRIBED BY CMS-2020-01-R: HCPCS

## 2021-11-10 LAB — SARS-COV-2 RNA RESP QL NAA+PROBE: NEGATIVE

## 2021-11-16 ENCOUNTER — TELEPHONE (OUTPATIENT)
Dept: INTERNAL MEDICINE | Facility: CLINIC | Age: 76
End: 2021-11-16
Payer: MEDICARE

## 2021-11-16 DIAGNOSIS — E53.8 VITAMIN B12 DEFICIENCY (NON ANEMIC): Primary | ICD-10-CM

## 2021-11-16 RX ORDER — CYANOCOBALAMIN 1000 UG/ML
1000 INJECTION, SOLUTION INTRAMUSCULAR; SUBCUTANEOUS
Status: ACTIVE | OUTPATIENT
Start: 2021-11-16 | End: 2022-11-11

## 2021-11-16 NOTE — TELEPHONE ENCOUNTER
Reason for Call: Request for an order or referral:    Order or referral being requested: Order    Date needed: at your convenience    Has the patient been seen by the PCP for this problem? YES    Additional comments: Vitamin B12 Injection - Pt gets it every month and they are requesting for Dr Maria to put in orders for the whole year spouse can schedule them all at once... Please advise.    Phone number Patient can be reached at:  Home number on file [phone] (home)    Best Time:  ANY    Can we leave a detailed message on this number?  YES    Call taken on 11/16/2021 at 2:38 PM by Theo Sheriff

## 2021-11-16 NOTE — TELEPHONE ENCOUNTER
Order pended for b12 every 30 days for 12 doses.   Lisa Bakrley CMA ............... 4:53 PM, 11/16/21

## 2021-11-17 NOTE — TELEPHONE ENCOUNTER
Patient's wife notified and b12 appointments scheduled.  Lisa Barkley CMA ............... 11:14 AM, 11/17/21

## 2021-11-18 ENCOUNTER — TRANSFERRED RECORDS (OUTPATIENT)
Dept: HEALTH INFORMATION MANAGEMENT | Facility: CLINIC | Age: 76
End: 2021-11-18
Payer: MEDICARE

## 2021-12-06 ENCOUNTER — TELEPHONE (OUTPATIENT)
Dept: INTERNAL MEDICINE | Facility: CLINIC | Age: 76
End: 2021-12-06
Payer: MEDICARE

## 2021-12-08 ENCOUNTER — ALLIED HEALTH/NURSE VISIT (OUTPATIENT)
Dept: FAMILY MEDICINE | Facility: CLINIC | Age: 76
End: 2021-12-08
Payer: MEDICARE

## 2021-12-08 DIAGNOSIS — E53.8 VITAMIN B12 DEFICIENCY (NON ANEMIC): Primary | ICD-10-CM

## 2021-12-08 PROCEDURE — 99207 PR NO CHARGE NURSE ONLY: CPT

## 2021-12-08 PROCEDURE — 96372 THER/PROPH/DIAG INJ SC/IM: CPT | Performed by: INTERNAL MEDICINE

## 2021-12-08 RX ADMIN — CYANOCOBALAMIN 1000 MCG: 1000 INJECTION, SOLUTION INTRAMUSCULAR; SUBCUTANEOUS at 09:14

## 2022-01-05 ENCOUNTER — TRANSFERRED RECORDS (OUTPATIENT)
Dept: HEALTH INFORMATION MANAGEMENT | Facility: CLINIC | Age: 77
End: 2022-01-05
Payer: MEDICARE

## 2022-01-11 ENCOUNTER — ALLIED HEALTH/NURSE VISIT (OUTPATIENT)
Dept: FAMILY MEDICINE | Facility: CLINIC | Age: 77
End: 2022-01-11
Payer: MEDICARE

## 2022-01-11 DIAGNOSIS — E53.8 VITAMIN B12 DEFICIENCY (NON ANEMIC): Primary | ICD-10-CM

## 2022-01-11 PROCEDURE — 96372 THER/PROPH/DIAG INJ SC/IM: CPT | Performed by: INTERNAL MEDICINE

## 2022-01-11 PROCEDURE — 99207 PR DROP WITH A PROCEDURE: CPT

## 2022-01-11 RX ADMIN — CYANOCOBALAMIN 1000 MCG: 1000 INJECTION, SOLUTION INTRAMUSCULAR; SUBCUTANEOUS at 09:22

## 2022-01-12 ENCOUNTER — TRANSFERRED RECORDS (OUTPATIENT)
Dept: HEALTH INFORMATION MANAGEMENT | Facility: CLINIC | Age: 77
End: 2022-01-12
Payer: MEDICARE

## 2022-01-21 ENCOUNTER — TRANSFERRED RECORDS (OUTPATIENT)
Dept: HEALTH INFORMATION MANAGEMENT | Facility: CLINIC | Age: 77
End: 2022-01-21
Payer: MEDICARE

## 2022-01-30 ENCOUNTER — HEALTH MAINTENANCE LETTER (OUTPATIENT)
Age: 77
End: 2022-01-30

## 2022-02-10 ENCOUNTER — ALLIED HEALTH/NURSE VISIT (OUTPATIENT)
Dept: FAMILY MEDICINE | Facility: CLINIC | Age: 77
End: 2022-02-10
Payer: MEDICARE

## 2022-02-10 DIAGNOSIS — E53.8 VITAMIN B12 DEFICIENCY (NON ANEMIC): Primary | ICD-10-CM

## 2022-02-10 PROCEDURE — 99207 PR NO CHARGE NURSE ONLY: CPT

## 2022-02-10 PROCEDURE — 99207 PR NO CHARGE NURSE ONLY: CPT | Performed by: INTERNAL MEDICINE

## 2022-02-10 PROCEDURE — 96372 THER/PROPH/DIAG INJ SC/IM: CPT | Performed by: INTERNAL MEDICINE

## 2022-02-10 RX ADMIN — CYANOCOBALAMIN 1000 MCG: 1000 INJECTION, SOLUTION INTRAMUSCULAR; SUBCUTANEOUS at 09:04

## 2022-02-18 ENCOUNTER — OFFICE VISIT (OUTPATIENT)
Dept: INTERNAL MEDICINE | Facility: CLINIC | Age: 77
End: 2022-02-18
Payer: MEDICARE

## 2022-02-18 VITALS
BODY MASS INDEX: 32.49 KG/M2 | HEART RATE: 74 BPM | HEIGHT: 65 IN | DIASTOLIC BLOOD PRESSURE: 60 MMHG | SYSTOLIC BLOOD PRESSURE: 110 MMHG | WEIGHT: 195 LBS | OXYGEN SATURATION: 97 %

## 2022-02-18 DIAGNOSIS — R07.0 THROAT PAIN: Primary | ICD-10-CM

## 2022-02-18 DIAGNOSIS — M48.062 SPINAL STENOSIS, LUMBAR REGION, WITH NEUROGENIC CLAUDICATION: ICD-10-CM

## 2022-02-18 DIAGNOSIS — K86.2 PANCREATIC CYST: ICD-10-CM

## 2022-02-18 PROCEDURE — 99213 OFFICE O/P EST LOW 20 MIN: CPT | Performed by: INTERNAL MEDICINE

## 2022-02-18 NOTE — PATIENT INSTRUCTIONS
Symptom related visit today  75-year-old man, retired  and      Chronic but recently worse left sided throat pain  First noticed about 2 years ago, it was very intermittent, and we did not previously discussed the symptom.  However it was has been more noticeable over the last month or so, and even prompted him to go to the [facility] urgency room on February 11, 2022.  The emergency physician suggested getting a CT scan of the neck, but that has not yet been done.    Mickey told me that he feels a discomfort below the left angle of the jaw.  I do not find any palpable abnormality.  The posterior pharynx and I am able to visualize seems normal.    He says his voice is about the same, swallowing makes the area hurt more.    We will have him see ENT for a detailed examination.     Pancreatic cyst,   surveillance upper endoscopy ultrasound on November 11, 2021,  Dr. Fidel Salter  An anechoic lesion suggestive of a cyst was identified in the pancreatic body. The lesion measured 24 mm by 13 mm in maximal cross-sectional diameter. There was a single compartment without septae. The outer wall of the lesion was not seen. There was no associated mass. There was no internal debris within the fluid-filled cavity.  - A cystic lesion was seen in the pancreatic body. Tissue has not been obtained. 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  - No specimens collected.    Plan is for annual surveillance    History of recurrent bouts of pancreatitis 1989, 1990, 96, 2008, with a 1.1 x 1.9 cm pancreatic cyst most recently imaged by CT October 19, 2018, stable in size.  He told me that alcohol consumption is 0.     Peripheral neuropathy with numbness in his feet, also restless leg syndrome, has been working with neurologist Dr. Gallegos, and is currently on gabapentin 200 mg taken twice each evening at 6PM and 9 PM     He has undergone EMG which  demonstrated neuropathy.  Laboratory work did not identify an underlying cause.  He experiences pins-and-needles dysesthesias.  Ferritin level was normal.  Past trial of Cymbalta did not produce much relief.      He does find gabapentin helpful and has found a dose that seems to give him adequate relief at 200 mg at 6 PM, and 200 mg at 9 PM.     Trial of topiramate was unsuccessful because of intolerable side effects of sleepiness, and therefore discontinued yesterday October 25, 2021     Lumbar spinal stenosis, for which has been getting epidural steroid injections approximately twice a year.    He recalls last epidural steroid injection was in June 2021, which gave him relief for a couple months, but he is hurting again.     He is working with a chiropractor twice a week  But I think he might benefit from some additional physical therapy.  [facility] would [facility] [place], slight entered external referral.     He told me that he might use 1 or 2 tramadol tablets a week for his back.  I reminded him to minimize the use of the tramadol, because it can contribute to constipation.  I will renew his prescription today for 30 tablets.    Essential hypertension, blood pressure nicely controlled on combination of losartan 50 mg a day with hydrochlorothiazide 12.5 mg a day.  Goal blood pressure is 120/80.     lipids good, no medicine November 2020  Cholesterol <=199 mg/dL 157   Triglycerides <=149 mg/dL 133   Direct Measure HDL >=40 mg/dL 44   LDL Cholesterol Calculated <=129 mg/dL 86      Chronic kidney disease stage II with GFR of 59 and creatinine of approximately 1.2 most recently measured 2-     Resolved 1 to 2-second episodes from April 2021 of blurry vision, lightheadedness, pressure behind eyes, which I wonder whether this might be symptoms of sinus congestion and irritation; long history of seasonal allergic rhinitis      Benign prostatic hyperplasia with urinary frequency, Flomax was  ineffective, PSA was quite satisfactory only 1.0 measured November 20, 2020.  He told me that he empties his bladder completely, and that is the most important thing.     Prediabetes in the context of obesity  10-  Hemoglobin A1C 0.0 - 5.6 % 5.8 High       Obesity with body mass index of 32.4.  I would like to see him lose 20 pounds this year.  I reminded him about the importance of eating slower, controlling portion size, and identifying problem foods to curtail or eliminate, especially starches, sweets, fried foods.     Chronic neck pain.  Similar to his back, he needs to work on strengthening her upper back muscles also deep neck muscles, sleep on a properly supportive pillow, and maintain mobility and flexibility.     History of Norton's esophagus negative for dysplasia on most recent endoscopy October 28, 2020.  Recheck 3 years after that which would be October 2023.  Maintained on omeprazole.     History of pernicious anemia taking vitamin B12 injections monthly     Status post right total knee arthroplasty, initially 2004, redo in 2009, now doing well.     Status post cataract surgery both eyes 2018, dry eye syndrome recently prescribed Restasis drops.     Received his second shot of Pfizer Covid vaccine March 5, 2021, third shot today October 26, 2021     Constipation, he told me that he might go 2 to 3 days without a bowel movement.  I reminded him to minimize the tramadol opioid.  He asked about Linzess, but I think he needs to first go with the first line management which would be MiraLAX (polyethylene glycol powder).  I told him that he could take 1 capful of MiraLAX powder a day, diluted into his favorite beverage.     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    Vaccinated and boosted for COVID  Immunization History   Administered Date(s) Administered     COVID-19,PF,Pfizer (12+ Yrs)  02/12/2021, 03/05/2021, 10/26/2021     Td (Adult), Adsorbed 01/01/2006     Tdap (Adacel,Boostrix) 08/17/2011

## 2022-02-18 NOTE — PROGRESS NOTES
Office Visit - Follow Up   Mickey Desouza   76 year old male    Date of Visit: 2/18/2022    Chief Complaint   Patient presents with     Pharyngitis     Sore throat off and on x 2 years, some pain in jaw and ear at times, pretty constant now, trouble swallowing, neg covid and strep on 2-11-22        -------------------------------------------------------------------------------------------------------------------------  Assessment and Plan    Symptom related visit today  75-year-old man, retired  and      Chronic but recently worse left sided throat pain  First noticed about 2 years ago, it was very intermittent, and we did not previously discussed the symptom.  However it was has been more noticeable over the last month or so, and even prompted him to go to the [facility] urgency room on February 11, 2022.  The emergency physician suggested getting a CT scan of the neck, but that has not yet been done.    Mickey told me that he feels a discomfort below the left angle of the jaw.  I do not find any palpable abnormality.  The posterior pharynx and I am able to visualize seems normal.    He says his voice is about the same, swallowing makes the area hurt more.    We will have him see ENT for a detailed examination.     Pancreatic cyst,   surveillance upper endoscopy ultrasound on November 11, 2021,  Dr. Fidel Salter  An anechoic lesion suggestive of a cyst was identified in the pancreatic body. The lesion measured 24 mm by 13 mm in maximal cross-sectional diameter. There was a single compartment without septae. The outer wall of the lesion was not seen. There was no associated mass. There was no internal debris within the fluid-filled cavity.  - A cystic lesion was seen in the pancreatic body. Tissue has not been obtained. 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  - No specimens collected.    Plan is for annual  surveillance    History of recurrent bouts of pancreatitis 1989, 1990, 96, 2008, with a 1.1 x 1.9 cm pancreatic cyst most recently imaged by CT October 19, 2018, stable in size.  He told me that alcohol consumption is 0.     Peripheral neuropathy with numbness in his feet, also restless leg syndrome, has been working with neurologist Dr. Gallegos, and is currently on gabapentin 200 mg taken twice each evening at 6PM and 9 PM     He has undergone EMG which demonstrated neuropathy.  Laboratory work did not identify an underlying cause.  He experiences pins-and-needles dysesthesias.  Ferritin level was normal.  Past trial of Cymbalta did not produce much relief.      He does find gabapentin helpful and has found a dose that seems to give him adequate relief at 200 mg at 6 PM, and 200 mg at 9 PM.     Trial of topiramate was unsuccessful because of intolerable side effects of sleepiness, and therefore discontinued yesterday October 25, 2021     Lumbar spinal stenosis, for which has been getting epidural steroid injections approximately twice a year.    He recalls last epidural steroid injection was in June 2021, which gave him relief for a couple months, but he is hurting again.     He is working with a chiropractor twice a week  But I think he might benefit from some additional physical therapy.  [facility] would like to [facility] [place], slight entered external referral.     He told me that he might use 1 or 2 tramadol tablets a week for his back.  I reminded him to minimize the use of the tramadol, because it can contribute to constipation.  I will renew his prescription today for 30 tablets.    Essential hypertension, blood pressure nicely controlled on combination of losartan 50 mg a day with hydrochlorothiazide 12.5 mg a day.  Goal blood pressure is 120/80.     lipids good, no medicine November 2020  Cholesterol <=199 mg/dL 157   Triglycerides <=149 mg/dL 133   Direct Measure HDL >=40 mg/dL 44   LDL  Cholesterol Calculated <=129 mg/dL 86      Chronic kidney disease stage II with GFR of 59 and creatinine of approximately 1.2 most recently measured 2-     Resolved 1 to 2-second episodes from April 2021 of blurry vision, lightheadedness, pressure behind eyes, which I wonder whether this might be symptoms of sinus congestion and irritation; long history of seasonal allergic rhinitis      Benign prostatic hyperplasia with urinary frequency, Flomax was ineffective, PSA was quite satisfactory only 1.0 measured November 20, 2020.  He told me that he empties his bladder completely, and that is the most important thing.     Prediabetes in the context of obesity  10-  Hemoglobin A1C 0.0 - 5.6 % 5.8 High       Obesity with body mass index of 32.4.  I would like to see him lose 20 pounds this year.  I reminded him about the importance of eating slower, controlling portion size, and identifying problem foods to curtail or eliminate, especially starches, sweets, fried foods.     Chronic neck pain.  Similar to his back, he needs to work on strengthening her upper back muscles also deep neck muscles, sleep on a properly supportive pillow, and maintain mobility and flexibility.     History of Norton's esophagus negative for dysplasia on most recent endoscopy October 28, 2020.  Recheck 3 years after that which would be October 2023.  Maintained on omeprazole.     History of pernicious anemia taking vitamin B12 injections monthly     Status post right total knee arthroplasty, initially 2004, redo in 2009, now doing well.     Status post cataract surgery both eyes 2018, dry eye syndrome recently prescribed Restasis drops.     Received his second shot of Pfizer Covid vaccine March 5, 2021, third shot today October 26, 2021     Constipation, he told me that he might go 2 to 3 days without a bowel movement.  I reminded him to minimize the tramadol opioid.  He asked about Linzess, but I think he needs to first go with the  first line management which would be MiraLAX (polyethylene glycol powder).  I told him that he could take 1 capful of MiraLAX powder a day, diluted into his favorite beverage.     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    Vaccinated and boosted for COVID  Immunization History   Administered Date(s) Administered     COVID-19,PF,Pfizer (12+ Yrs) 02/12/2021, 03/05/2021, 10/26/2021     Td (Adult), Adsorbed 01/01/2006     Tdap (Adacel,Boostrix) 08/17/2011       --------------------------------------------------------------------------------------------------------------------------  History of Present Illness  This 76 year old old     75-year-old man, retired  and      Chronic but recently worse left sided throat pain  First noticed about 2 years ago, it was very intermittent, and we did not previously discussed the symptom.  However it was has been more noticeable over the last month or so, and even prompted him to go to the [facility] urgency room on February 11, 2022.  The emergency physician suggested getting a CT scan of the neck, but that has not yet been done.    Mickey told me that he feels a discomfort below the left angle of the jaw.  I do not find any palpable abnormality.  The posterior pharynx and I am able to visualize seems normal.    He says his voice is about the same, swallowing makes the area hurt more.    We will have him see ENT for a detailed examination.     Pancreatic cyst,   surveillance upper endoscopy ultrasound on November 11, 2021,  Dr. Fidel Salter  An anechoic lesion suggestive of a cyst was identified in the pancreatic body. The lesion measured 24 mm by 13 mm in maximal cross-sectional diameter. There was a single compartment without septae. The outer wall of the lesion was not seen. There was no associated mass. There was no internal debris within the fluid-filled  cavity.  - A cystic lesion was seen in the pancreatic body. Tissue has not been obtained. 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  - No specimens collected.    Plan is for annual surveillance    History of recurrent bouts of pancreatitis 1989, 1990, 96, 2008, with a 1.1 x 1.9 cm pancreatic cyst most recently imaged by CT October 19, 2018, stable in size.  He told me that alcohol consumption is 0.         Wt Readings from Last 3 Encounters:   02/18/22 88.5 kg (195 lb)   11/05/21 90 kg (198 lb 8 oz)   10/26/21 88.9 kg (195 lb 14.4 oz)     BP Readings from Last 3 Encounters:   02/18/22 110/60   11/05/21 118/76   10/26/21 116/76       ---------------------------------------------------------------------------------------------------------------------------    Medications, Allergies, Social, and Problem List   Current Outpatient Medications   Medication Sig Dispense Refill     aspirin (ASA) 81 MG chewable tablet   Instructions: Take 81 mg by mouth daily., Type: Maintenance       gabapentin (NEURONTIN) 100 MG capsule Take 100 mg at 9:00 AM, 200 mg at 6:00 PM, and 100 mg at 9:00 PM (Patient taking differently: 200 mg at 6:00 PM, and 200 mg at 9:00 PM) 360 capsule 3     glucosamine-chondroitin 500-400 MG CAPS per capsule   Instructions: Take 1 capsule by mouth 2 (two) times a day., Type: Maintenance       losartan-hydrochlorothiazide (HYZAAR) 50-12.5 MG tablet Take 1 tablet by mouth daily 90 tablet 3     Multiple Vitamins-Minerals (DAILY MULTIVITAMIN PO)   Instructions: Take 1 tablet by mouth daily., Type: Maintenance       omeprazole (PRILOSEC) 20 MG DR capsule Take 20 mg by mouth daily        traMADol (ULTRAM) 50 MG tablet Take 1 tablet (50 mg) by mouth every 6 hours as needed for breakthrough pain 30 tablet 0     Allergies   Allergen Reactions     Ciprofloxacin Other (See Comments)     Codeine      Lisinopril Cough     Morphine      Social  "History     Tobacco Use     Smoking status: Never Smoker     Smokeless tobacco: Never Used   Substance Use Topics     Alcohol use: Not Currently     Comment: Alcoholic Drinks/day: occasionally, beer     Drug use: No     Patient Active Problem List   Diagnosis     Urinary frequency     Stage 3a chronic kidney disease (H)     Restless legs syndrome     Neuropathy     Neuropathic pain     Neck pain, chronic     Hypertension     Allergic rhinitis     Burning sensation of feet     Gastroesophageal reflux disease     Obesity     Pancreatic cyst     Spinal stenosis, lumbar region, with neurogenic claudication        Reviewed, reconciled and updated       Physical Exam   General Appearance:      /60 (BP Location: Right arm, Patient Position: Sitting, Cuff Size: Adult Regular)   Pulse 74   Ht 1.651 m (5' 5\")   Wt 88.5 kg (195 lb)   SpO2 97%   BMI 32.45 kg/m      Mickey told me that he feels a discomfort below the left angle of the jaw.  I do not find any palpable abnormality.  The posterior pharynx and I am able to visualize seems normal.     Additional Information        BORA AC MD, MD    Answers for HPI/ROS submitted by the patient on 2/16/2022  How many servings of fruits and vegetables do you eat daily?: 2-3  On average, how many sweetened beverages do you drink each day (Examples: soda, juice, sweet tea, etc.  Do NOT count diet or artificially sweetened beverages)?: 1  How many minutes a day do you exercise enough to make your heart beat faster?: 10 to 19  How many days a week do you exercise enough to make your heart beat faster?: 3 or less  How many days per week do you miss taking your medication?: 0  What is the reason for your visit today?: Chronic sore throat pain  When did your symptoms begin?: More than a month  What are your symptoms?: Left side of lower throat is always sore, sometimes a pulling sensation from the left ear  How would you describe these symptoms?: Moderate  Are your " symptoms:: Worsening  Have you had these symptoms before?: Yes  Have you tried or received treatment for these symptoms before?: Yes  Did that treatment work? : No  Is there anything that makes you feel worse?: Drinking orange juice irritates the throat  Is there anything that makes you feel better?: Gargaling with warm salt water or Listerine , it feels better for a short time.

## 2022-02-23 ENCOUNTER — TRANSFERRED RECORDS (OUTPATIENT)
Dept: HEALTH INFORMATION MANAGEMENT | Facility: CLINIC | Age: 77
End: 2022-02-23
Payer: MEDICARE

## 2022-03-09 ENCOUNTER — OFFICE VISIT (OUTPATIENT)
Dept: OTOLARYNGOLOGY | Facility: CLINIC | Age: 77
End: 2022-03-09
Attending: INTERNAL MEDICINE
Payer: MEDICARE

## 2022-03-09 DIAGNOSIS — K21.9 LARYNGOPHARYNGEAL REFLUX (LPR): ICD-10-CM

## 2022-03-09 DIAGNOSIS — J37.0 CHRONIC LARYNGITIS: Primary | ICD-10-CM

## 2022-03-09 DIAGNOSIS — M26.622 ARTHRALGIA OF LEFT TEMPOROMANDIBULAR JOINT: ICD-10-CM

## 2022-03-09 DIAGNOSIS — R07.0 THROAT PAIN: ICD-10-CM

## 2022-03-09 PROCEDURE — 99203 OFFICE O/P NEW LOW 30 MIN: CPT | Mod: 25 | Performed by: OTOLARYNGOLOGY

## 2022-03-09 PROCEDURE — 31575 DIAGNOSTIC LARYNGOSCOPY: CPT | Performed by: OTOLARYNGOLOGY

## 2022-03-09 NOTE — PROGRESS NOTES
HPI: This patient is a 75yo M who presents for evaluation of the throat at the request of Dr. Maria. There has been a globus sensation more on the left for about 2 years. Recently, it has become more consistent, whereas it had previously been more episodic. Denies fevers, otalgia, weight loss, odynophagia, dysphagia, hemoptysis, and shortness of breath. Still complains of sour taste and heartburn. Is taking reflux medication. He knows that he has a hiatal hernia and will be seeing his GI next month.    Past medical history, surgical history, social history, family history, medications, and allergies have been reviewed with the patient and are documented above.    Review of Systems: a 10-system review was performed. Pertinent positives are noted in the HPI and on a separate scanned document in the chart.    PHYSICAL EXAMINATION:  GEN: no acute distress, normocephalic  EYES: extraocular movements are intact, pupils are equal and round. Sclera clear.   EARS: auricles are normally formed. The external auditory canals are clear with minimal to no cerumen. Tympanic membranes are intact bilaterally with no signs of infection, effusion, retractions, or perforations.  NOSE: anterior nares are patent. There are no masses or lesions. The septum is non-obstructing.  OC/OP: clear, dentition is in good repair. The tongue and palate are fully mobile and symmetric. No masses or lesions. Cobblestoning of the posterior pharyngeal wall.  HP/L (scope): nasopharynx, base of tongue, vallecula, epiglottis, and pyriform sinuses are clear. The bilateral vocal folds are mobile and without lesion. There is interarytenoid pachydermia and some hyperemia of the laryngeal surface of the epiglottis c/w LPR.  NECK: soft and supple. No lymphadenopathy or masses. Airway is midline. Left TMJ tenderness on palpation.  NEURO: CN VII and XII symmetric. alert and oriented. No spontaneous nystagmus. Gait is normal.  PULM: breathing comfortably on room air,  normal chest expansion with respiration  CARDS: no cyanosis or clubbing, normal carotid pulses    FLEXIBLE LARYNGOSCOPY: Scope inserted bilaterally to examine nasal tissue, nasopharynx, posterior oropharynx, hypopharynx, and larynx. See exam notes for exam finding details. Patient tolerated the procedure well.    MEDICAL DECISION-MAKING: This patient is a 77yo M with chronic laryngitis/globus sensation from his known acid reflux and hiatal hernia. Discussed diet and lifestyle changes in addition to continuing to his medication regimen until he sees GI next month. Reassured him that there is no sinister pathology. He also has known left TMJ and has occasional symptoms with that.

## 2022-03-09 NOTE — LETTER
3/9/2022         RE: Mickey Desouza  [address] [place] [id number]        Dear Colleague,    Thank you for referring your patient, Mickey Desouza, to the [facility]. Please see a copy of my visit note below.    HPI: This patient is a 75yo M who presents for evaluation of the throat at the request of Dr. Maria. There has been a globus sensation more on the left for about 2 years. Recently, it has become more consistent, whereas it had previously been more episodic. Denies fevers, otalgia, weight loss, odynophagia, dysphagia, hemoptysis, and shortness of breath. Still complains of sour taste and heartburn. Is taking reflux medication. He knows that he has a hiatal hernia and will be seeing his GI next month.    Past medical history, surgical history, social history, family history, medications, and allergies have been reviewed with the patient and are documented above.    Review of Systems: a 10-system review was performed. Pertinent positives are noted in the HPI and on a separate scanned document in the chart.    PHYSICAL EXAMINATION:  GEN: no acute distress, normocephalic  EYES: extraocular movements are intact, pupils are equal and round. Sclera clear.   EARS: auricles are normally formed. The external auditory canals are clear with minimal to no cerumen. Tympanic membranes are intact bilaterally with no signs of infection, effusion, retractions, or perforations.  NOSE: anterior nares are patent. There are no masses or lesions. The septum is non-obstructing.  OC/OP: clear, dentition is in good repair. The tongue and palate are fully mobile and symmetric. No masses or lesions. Cobblestoning of the posterior pharyngeal wall.  HP/L (scope): nasopharynx, base of tongue, vallecula, epiglottis, and pyriform sinuses are clear. The bilateral vocal folds are mobile and without lesion. There is interarytenoid pachydermia and some hyperemia of the laryngeal surface of the epiglottis  c/w LPR.  NECK: soft and supple. No lymphadenopathy or masses. Airway is midline. Left TMJ tenderness on palpation.  NEURO: CN VII and XII symmetric. alert and oriented. No spontaneous nystagmus. Gait is normal.  PULM: breathing comfortably on room air, normal chest expansion with respiration  CARDS: no cyanosis or clubbing, normal carotid pulses    FLEXIBLE LARYNGOSCOPY: Scope inserted bilaterally to examine nasal tissue, nasopharynx, posterior oropharynx, hypopharynx, and larynx. See exam notes for exam finding details. Patient tolerated the procedure well.    MEDICAL DECISION-MAKING: This patient is a 77yo M with chronic laryngitis/globus sensation from his known acid reflux and hiatal hernia. Discussed diet and lifestyle changes in addition to continuing to his medication regimen until he sees GI next month. Reassured him that there is no sinister pathology. He also has known left TMJ and has occasional symptoms with that.         Again, thank you for allowing me to participate in the care of your patient.        Sincerely,        Geeta Bradley MD

## 2022-03-10 ENCOUNTER — ALLIED HEALTH/NURSE VISIT (OUTPATIENT)
Dept: FAMILY MEDICINE | Facility: CLINIC | Age: 77
End: 2022-03-10
Payer: MEDICARE

## 2022-03-10 DIAGNOSIS — E53.8 VITAMIN B12 DEFICIENCY (NON ANEMIC): Primary | ICD-10-CM

## 2022-03-10 PROCEDURE — 96372 THER/PROPH/DIAG INJ SC/IM: CPT | Performed by: INTERNAL MEDICINE

## 2022-03-10 PROCEDURE — 99207 PR NO CHARGE NURSE ONLY: CPT

## 2022-03-10 RX ADMIN — CYANOCOBALAMIN 1000 MCG: 1000 INJECTION, SOLUTION INTRAMUSCULAR; SUBCUTANEOUS at 08:55

## 2022-04-07 ENCOUNTER — ALLIED HEALTH/NURSE VISIT (OUTPATIENT)
Dept: FAMILY MEDICINE | Facility: CLINIC | Age: 77
End: 2022-04-07
Payer: MEDICARE

## 2022-04-07 DIAGNOSIS — E53.8 VITAMIN B12 DEFICIENCY (NON ANEMIC): Primary | ICD-10-CM

## 2022-04-07 PROCEDURE — 99207 PR NO CHARGE NURSE ONLY: CPT

## 2022-04-07 PROCEDURE — 96372 THER/PROPH/DIAG INJ SC/IM: CPT | Performed by: INTERNAL MEDICINE

## 2022-04-07 RX ADMIN — CYANOCOBALAMIN 1000 MCG: 1000 INJECTION, SOLUTION INTRAMUSCULAR; SUBCUTANEOUS at 08:55

## 2022-04-11 ENCOUNTER — TRANSFERRED RECORDS (OUTPATIENT)
Dept: HEALTH INFORMATION MANAGEMENT | Facility: CLINIC | Age: 77
End: 2022-04-11
Payer: MEDICARE

## 2022-04-26 ENCOUNTER — OFFICE VISIT (OUTPATIENT)
Dept: INTERNAL MEDICINE | Facility: CLINIC | Age: 77
End: 2022-04-26
Payer: MEDICARE

## 2022-04-26 VITALS
HEIGHT: 65 IN | DIASTOLIC BLOOD PRESSURE: 70 MMHG | BODY MASS INDEX: 33.49 KG/M2 | SYSTOLIC BLOOD PRESSURE: 122 MMHG | HEART RATE: 85 BPM | OXYGEN SATURATION: 97 % | WEIGHT: 201 LBS

## 2022-04-26 DIAGNOSIS — E66.09 CLASS 1 OBESITY DUE TO EXCESS CALORIES WITHOUT SERIOUS COMORBIDITY WITH BODY MASS INDEX (BMI) OF 33.0 TO 33.9 IN ADULT: ICD-10-CM

## 2022-04-26 DIAGNOSIS — I10 ESSENTIAL HYPERTENSION: ICD-10-CM

## 2022-04-26 DIAGNOSIS — K21.9 GASTROESOPHAGEAL REFLUX DISEASE WITHOUT ESOPHAGITIS: Primary | ICD-10-CM

## 2022-04-26 DIAGNOSIS — Z79.899 ENCOUNTER FOR LONG-TERM (CURRENT) USE OF MEDICATIONS: ICD-10-CM

## 2022-04-26 DIAGNOSIS — R73.03 PREDIABETES: ICD-10-CM

## 2022-04-26 DIAGNOSIS — E66.811 CLASS 1 OBESITY DUE TO EXCESS CALORIES WITHOUT SERIOUS COMORBIDITY WITH BODY MASS INDEX (BMI) OF 33.0 TO 33.9 IN ADULT: ICD-10-CM

## 2022-04-26 LAB
HBA1C MFR BLD: 5.9 % (ref 0–5.6)
TSH SERPL DL<=0.005 MIU/L-ACNC: 1.61 UIU/ML (ref 0.3–5)

## 2022-04-26 PROCEDURE — 84443 ASSAY THYROID STIM HORMONE: CPT | Performed by: INTERNAL MEDICINE

## 2022-04-26 PROCEDURE — 99214 OFFICE O/P EST MOD 30 MIN: CPT | Performed by: INTERNAL MEDICINE

## 2022-04-26 PROCEDURE — 83036 HEMOGLOBIN GLYCOSYLATED A1C: CPT | Performed by: INTERNAL MEDICINE

## 2022-04-26 PROCEDURE — 36415 COLL VENOUS BLD VENIPUNCTURE: CPT | Performed by: INTERNAL MEDICINE

## 2022-04-26 RX ORDER — OMEPRAZOLE 40 MG/1
1 CAPSULE, DELAYED RELEASE ORAL 2 TIMES DAILY
COMMUNITY
Start: 2022-04-11 | End: 2023-04-27

## 2022-04-26 NOTE — PATIENT INSTRUCTIONS
Follow-up for several issues    Overall doing well, but he has put on a few pounds and needs to reverse that because the excess weight contributes to his gastroesophageal reflux problem    We will have him stop by the laboratory this morning for A1c and also recheck his thyroid status.  He has gained a couple pounds of weight.  Previous A1c of 5.6 indicated mild prediabetes    75-year-old man, retired  and      Chronic laryngitis/globus sensation from his known acid reflux and hiatal hernia  He had a helpful consultation with otolaryngology Dr. Bradley March 9, 2022 because of chronic throat pain  Dr. Bradley's observed chronic laryngitis on fiberoptic laryngoscopy, and recommended that Mickey get more aggressive with antigastroesophageal reflux therapy Mickey subsequently increased his omeprazole dose to 40 mg daily.     I also reminded him of the importance of not lying down within 2 hours of eating  He told me it has been a habit to snack after supper, and then lie down in his recliner chair.  He needs to fix that habit.    Gastroesophageal reflux and hiatal hernia, will have upper endoscopy scheduled for June 8, 2022 at [facility], on high-dose omeprazole 40 mg daily as of April 2022  History of Norton's esophagus negative for dysplasia on most recent endoscopy October 28, 2020.      Pancreatic cyst, surveillance upper endoscopy ultrasound on November 11, 2021,  Dr. Fidel Salter  An anechoic lesion suggestive of a cyst was identified in the pancreatic body. The lesion measured 24 mm by 13 mm in maximal cross-sectional diameter. There was a single compartment without septae. The outer wall of the lesion was not seen. There was no associated mass. There was no internal debris within the fluid-filled cavity.  - A cystic lesion was seen in the pancreatic body. Tissue has not been obtained. 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  - No specimens collected.     Plan is for annual surveillance     History of recurrent bouts of pancreatitis 1989, 1990, 96, 2008, with a 1.1 x 1.9 cm pancreatic cyst most recently imaged by CT October 19, 2018, stable in size.  He told me that alcohol consumption is 0.     Peripheral neuropathy with numbness in his feet, also restless leg syndrome, has been working with neurologist Dr. Adame, and is currently on gabapentin 200 mg taken twice each evening at 6 PM and 9 PM     He has undergone EMG which demonstrated neuropathy.  Laboratory work did not identify an underlying cause.  He experiences pins-and-needles dysesthesias.  Ferritin level was normal.  Past trial of Cymbalta did not produce much relief.     He does find gabapentin helpful and has found a dose that seems to give him adequate relief at 200 mg at 6 PM, and 200 mg at 9 PM.     Trial of topiramate was unsuccessful because of intolerable side effects of sleepiness, and therefore discontinued yesterday October 25, 2021     Lumbar spinal stenosis, for which has been getting epidural steroid injections approximately twice a year.    He recalls last epidural steroid injection was in June 2021, which gave him relief for a couple months, but he is hurting again.     He is working with a chiropractor twice a week  But I think he might benefit from some additional physical therapy.  [facility] would [facility] in [place], slight entered external referral.     He told me that he might use 1 or 2 tramadol tablets a week for his back.  I reminded him to minimize the use of the tramadol, because it can contribute to constipation.  I will renew his prescription today for 30 tablets.     Essential hypertension, blood pressure nicely controlled on combination of losartan 50 mg a day with hydrochlorothiazide 12.5 mg a day.  Goal blood pressure is 120/80.     BP Readings from Last 6 Encounters:   04/26/22 122/70   02/18/22  110/60   11/05/21 118/76   10/26/21 116/76   04/21/21 124/70   03/23/21 (!) 146/80     lipids good, no medicine November 2020  Cholesterol      <=199 mg/dL   157   Triglycerides    <=149 mg/dL   133   Direct Measure HDL   >=40 mg/dL     44   LDL Cholesterol Calculated    <=129 mg/dL   86      Chronic kidney disease stage II with GFR of 59 and creatinine of approximately 1.2 most recently measured 2-     Benign prostatic hyperplasia with urinary frequency, Flomax was ineffective, PSA was quite satisfactory only 1.0 measured November 20, 2020.  He told me that he empties his bladder completely, and that is the most important thing.     Prediabetes in the context of obesity  10-  Hemoglobin A1C 0.0 - 5.6 % 5.8 High       Obesity with body mass index of 32.4.  I would like to see him lose 20 pounds this year.  I reminded him about the importance of eating slower, controlling portion size, and identifying problem foods to curtail or eliminate, especially starches, sweets, fried foods.    Wt Readings from Last 5 Encounters:   04/26/22 91.2 kg (201 lb)   02/18/22 88.5 kg (195 lb)   11/05/21 90 kg (198 lb 8 oz)   10/26/21 88.9 kg (195 lb 14.4 oz)   04/21/21 92.1 kg (203 lb)     Chronic neck pain.  Similar to his back, he needs to work on strengthening her upper back muscles also deep neck muscles, sleep on a properly supportive pillow, and maintain mobility and flexibility.      History of pernicious anemia taking vitamin B12 injections monthly     Status post right total knee arthroplasty, initially 2004, redo in 2009, now doing well.     Status post cataract surgery both eyes 2018, dry eye syndrome recently prescribed Restasis drops.     Constipation, he told me that he might go 2 to 3 days without a bowel movement.  I reminded him to minimize the tramadol opioid.  He asked about Linzess, but I think he needs to first go with the first line management which would be MiraLAX (polyethylene glycol powder).  I  told him that he could take 1 capful of MiraLAX powder a day, diluted into his favorite beverage.    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     Vaccinated and boosted X1 for COVID, third shot October 26, 2021    Immunization History   Administered Date(s) Administered    COVID-19,PF,Pfizer (12+ Yrs) 02/12/2021, 03/05/2021, 10/26/2021    Td (Adult), Adsorbed 01/01/2006    Tdap (Adacel,Boostrix) 08/17/2011

## 2022-04-26 NOTE — PROGRESS NOTES
Office Visit - Follow Up   Mickey Desouza   76 year old male    Date of Visit: 4/26/2022    Chief Complaint   Patient presents with     Follow Up     6 month check up, saw ENT for throat - not fasting         -------------------------------------------------------------------------------------------------------------------------  Assessment and Plan    Follow-up for several issues    Overall doing well, but he has put on a few pounds and needs to reverse that because the excess weight contributes to his gastroesophageal reflux problem    We will have him stop by the laboratory this morning for A1c and also recheck his thyroid status.  He has gained a couple pounds of weight.  Previous A1c of 5.6 indicated mild prediabetes    75-year-old man, retired  and      Chronic laryngitis/globus sensation from his known acid reflux and hiatal hernia  He had a helpful consultation with otolaryngology Dr. Bradley March 9, 2022 because of chronic throat pain  Dr. Bradley's observed chronic laryngitis on fiberoptic laryngoscopy, and recommended that Mickey get more aggressive with antigastroesophageal reflux therapy Mickey subsequently increased his omeprazole dose to 40 mg daily.     I also reminded him of the importance of not lying down within 2 hours of eating  He told me it has been a habit to snack after supper, and then lie down in his recliner chair.  He needs to fix that habit.    Gastroesophageal reflux and hiatal hernia, will have upper endoscopy scheduled for June 8, 2022 at [facility], on high-dose omeprazole 40 mg daily as of April 2022  History of Norton's esophagus negative for dysplasia on most recent endoscopy October 28, 2020.      Pancreatic cyst, surveillance upper endoscopy ultrasound on November 11, 2021,  Dr. Fidel Salter  An anechoic lesion suggestive of a cyst was identified in the pancreatic body. The lesion measured 24 mm by 13 mm in maximal cross-sectional diameter.  There was a single compartment without septae. The outer wall of the lesion was not seen. There was no associated mass. There was no internal debris within the fluid-filled cavity.  - A cystic lesion was seen in the pancreatic body. Tissue has not been obtained. 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  - No specimens collected.     Plan is for annual surveillance     History of recurrent bouts of pancreatitis 1989, 1990, 96, 2008, with a 1.1 x 1.9 cm pancreatic cyst most recently imaged by CT October 19, 2018, stable in size.  He told me that alcohol consumption is 0.     Peripheral neuropathy with numbness in his feet, also restless leg syndrome, has been working with neurologist Dr. Adame, and is currently on gabapentin 200 mg taken twice each evening at 6 PM and 9 PM     He has undergone EMG which demonstrated neuropathy.  Laboratory work did not identify an underlying cause.  He experiences pins-and-needles dysesthesias.  Ferritin level was normal.  Past trial of Cymbalta did not produce much relief.     He does find gabapentin helpful and has found a dose that seems to give him adequate relief at 200 mg at 6 PM, and 200 mg at 9 PM.     Trial of topiramate was unsuccessful because of intolerable side effects of sleepiness, and therefore discontinued yesterday October 25, 2021     Lumbar spinal stenosis, for which has been getting epidural steroid injections approximately twice a year.    He recalls last epidural steroid injection was in June 2021, which gave him relief for a couple months, but he is hurting again.     He is working with a chiropractor twice a week  But I think he might benefit from some additional physical therapy.  [facility] would like to [facility] in [place], slight entered external referral.     He told me that he might use 1 or 2 tramadol tablets a week for his back.  I reminded him to minimize the use of  the tramadol, because it can contribute to constipation.  I will renew his prescription today for 30 tablets.     Essential hypertension, blood pressure nicely controlled on combination of losartan 50 mg a day with hydrochlorothiazide 12.5 mg a day.  Goal blood pressure is 120/80.     BP Readings from Last 6 Encounters:   04/26/22 122/70   02/18/22 110/60   11/05/21 118/76   10/26/21 116/76   04/21/21 124/70   03/23/21 (!) 146/80     lipids good, no medicine November 2020  Cholesterol      <=199 mg/dL   157   Triglycerides    <=149 mg/dL   133   Direct Measure HDL   >=40 mg/dL     44   LDL Cholesterol Calculated    <=129 mg/dL   86      Chronic kidney disease stage II with GFR of 59 and creatinine of approximately 1.2 most recently measured 2-     Benign prostatic hyperplasia with urinary frequency, Flomax was ineffective, PSA was quite satisfactory only 1.0 measured November 20, 2020.  He told me that he empties his bladder completely, and that is the most important thing.     Prediabetes in the context of obesity  10-  Hemoglobin A1C 0.0 - 5.6 % 5.8 High       Obesity with body mass index of 32.4.  I would like to see him lose 20 pounds this year.  I reminded him about the importance of eating slower, controlling portion size, and identifying problem foods to curtail or eliminate, especially starches, sweets, fried foods.    Wt Readings from Last 5 Encounters:   04/26/22 91.2 kg (201 lb)   02/18/22 88.5 kg (195 lb)   11/05/21 90 kg (198 lb 8 oz)   10/26/21 88.9 kg (195 lb 14.4 oz)   04/21/21 92.1 kg (203 lb)     Chronic neck pain.  Similar to his back, he needs to work on strengthening her upper back muscles also deep neck muscles, sleep on a properly supportive pillow, and maintain mobility and flexibility.      History of pernicious anemia taking vitamin B12 injections monthly     Status post right total knee arthroplasty, initially 2004, redo in 2009, now doing well.     Status post cataract  surgery both eyes 2018, dry eye syndrome recently prescribed Restasis drops.     Constipation, he told me that he might go 2 to 3 days without a bowel movement.  I reminded him to minimize the tramadol opioid.  He asked about Linzess, but I think he needs to first go with the first line management which would be MiraLAX (polyethylene glycol powder).  I told him that he could take 1 capful of MiraLAX powder a day, diluted into his favorite beverage.    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     Vaccinated and boosted X1 for COVID, third shot October 26, 2021    Immunization History   Administered Date(s) Administered     COVID-19,PF,Pfizer (12+ Yrs) 02/12/2021, 03/05/2021, 10/26/2021     Td (Adult), Adsorbed 01/01/2006     Tdap (Adacel,Boostrix) 08/17/2011       --------------------------------------------------------------------------------------------------------------------------  History of Present Illness  This 76 year old old     Follow-up for several issues    Overall doing well, but he has put on a few pounds and needs to reverse that because the excess weight contributes to his gastroesophageal reflux problem    We will have him stop by the laboratory this morning for A1c and also recheck his thyroid status.  He has gained a couple pounds of weight.  Previous A1c of 5.6 indicated mild prediabetes    75-year-old man, retired  and      Chronic laryngitis/globus sensation from his known acid reflux and hiatal hernia  He had a helpful consultation with otolaryngology Dr. Bradley March 9, 2022 because of chronic throat pain  Dr. Bradley's observed chronic  laryngitis on fiberoptic laryngoscopy, and recommended that Mickey get more aggressive with antigastroesophageal reflux therapy Mickey subsequently increased his omeprazole dose to 40 mg daily.     I also reminded him of the importance of not lying down within 2 hours of eating  He told me it has been a habit to snack after supper, and then lie down in his recliner chair.  He needs to fix that habit.      Wt Readings from Last 3 Encounters:   04/26/22 91.2 kg (201 lb)   02/18/22 88.5 kg (195 lb)   11/05/21 90 kg (198 lb 8 oz)     BP Readings from Last 3 Encounters:   04/26/22 122/70   02/18/22 110/60   11/05/21 118/76     ---------------------------------------------------------------------------------------------------------------------------    Medications, Allergies, Social, and Problem List   Current Outpatient Medications   Medication Sig Dispense Refill     aspirin (ASA) 81 MG chewable tablet   Instructions: Take 81 mg by mouth daily., Type: Maintenance       gabapentin (NEURONTIN) 100 MG capsule Take 100 mg at 9:00 AM, 200 mg at 6:00 PM, and 100 mg at 9:00 PM (Patient taking differently: 200 mg at 6:00 PM, and 200 mg at 9:00 PM) 360 capsule 3     glucosamine-chondroitin 500-400 MG CAPS per capsule   Instructions: Take 1 capsule by mouth 2 (two) times a day., Type: Maintenance       losartan-hydrochlorothiazide (HYZAAR) 50-12.5 MG tablet Take 1 tablet by mouth daily 90 tablet 3     Multiple Vitamins-Minerals (DAILY MULTIVITAMIN PO)   Instructions: Take 1 tablet by mouth daily., Type: Maintenance       omeprazole (PRILOSEC) 40 MG DR capsule Take 1 capsule by mouth every 24 hours       traMADol (ULTRAM) 50 MG tablet Take 1 tablet (50 mg) by mouth every 6 hours as needed for breakthrough pain 30 tablet 0     Allergies   Allergen Reactions     Ciprofloxacin Other (See Comments)     Codeine      Lisinopril Cough     Morphine      Social History     Tobacco Use     Smoking status: Never Smoker     Smokeless tobacco:  "Never Used   Substance Use Topics     Alcohol use: Not Currently     Comment: Alcoholic Drinks/day: occasionally, beer     Drug use: No     Patient Active Problem List   Diagnosis     Urinary frequency     Restless legs syndrome     Neuropathy     Neuropathic pain     Neck pain, chronic     Hypertension     Allergic rhinitis     Burning sensation of feet     Gastroesophageal reflux disease     Obesity     Pancreatic cyst     Spinal stenosis, lumbar region, with neurogenic claudication        Reviewed, reconciled and updated       Physical Exam   General Appearance:       /70 (BP Location: Right arm, Patient Position: Sitting, Cuff Size: Adult Regular)   Pulse 85   Ht 1.651 m (5' 5\")   Wt 91.2 kg (201 lb)   SpO2 97%   BMI 33.45 kg/m      Mickey appears well today, blood pressure looks good, but unfortunately he is gained a few pounds     Additional Information   I spent 20 minutes on this encounter, including reviewing interval history since last visit, examining the patient, explaining and counseling the issues enumerated in the Assessment and Plan (patient given a copy), ordering indicated tests       BORA AC MD, MD  Answers for HPI/ROS submitted by the patient on 4/22/2022  How many servings of fruits and vegetables do you eat daily?: 2-3  On average, how many sweetened beverages do you drink each day (Examples: soda, juice, sweet tea, etc.  Do NOT count diet or artificially sweetened beverages)?: 1  How many days a week do you exercise enough to make your heart beat faster?: 3 or less  How many days per week do you miss taking your medication?: 0  What is the reason for your visit today?: Follow-up from October visit      "

## 2022-05-05 ENCOUNTER — ALLIED HEALTH/NURSE VISIT (OUTPATIENT)
Dept: FAMILY MEDICINE | Facility: CLINIC | Age: 77
End: 2022-05-05
Payer: MEDICARE

## 2022-05-05 DIAGNOSIS — E53.8 VITAMIN B12 DEFICIENCY (NON ANEMIC): Primary | ICD-10-CM

## 2022-05-05 PROCEDURE — 96372 THER/PROPH/DIAG INJ SC/IM: CPT | Performed by: INTERNAL MEDICINE

## 2022-05-05 PROCEDURE — 99207 PR NO CHARGE NURSE ONLY: CPT

## 2022-05-05 RX ADMIN — CYANOCOBALAMIN 1000 MCG: 1000 INJECTION, SOLUTION INTRAMUSCULAR; SUBCUTANEOUS at 08:54

## 2022-06-02 ENCOUNTER — ALLIED HEALTH/NURSE VISIT (OUTPATIENT)
Dept: FAMILY MEDICINE | Facility: CLINIC | Age: 77
End: 2022-06-02
Payer: MEDICARE

## 2022-06-02 DIAGNOSIS — E53.8 VITAMIN B12 DEFICIENCY (NON ANEMIC): Primary | ICD-10-CM

## 2022-06-02 PROCEDURE — 96372 THER/PROPH/DIAG INJ SC/IM: CPT | Performed by: INTERNAL MEDICINE

## 2022-06-02 PROCEDURE — 99207 PR NO CHARGE NURSE ONLY: CPT

## 2022-06-02 RX ADMIN — CYANOCOBALAMIN 1000 MCG: 1000 INJECTION, SOLUTION INTRAMUSCULAR; SUBCUTANEOUS at 08:57

## 2022-06-30 ENCOUNTER — ALLIED HEALTH/NURSE VISIT (OUTPATIENT)
Dept: FAMILY MEDICINE | Facility: CLINIC | Age: 77
End: 2022-06-30
Payer: MEDICARE

## 2022-06-30 DIAGNOSIS — E53.8 VITAMIN B12 DEFICIENCY (NON ANEMIC): Primary | ICD-10-CM

## 2022-06-30 PROCEDURE — 96372 THER/PROPH/DIAG INJ SC/IM: CPT | Performed by: INTERNAL MEDICINE

## 2022-06-30 PROCEDURE — 99207 PR NO CHARGE NURSE ONLY: CPT

## 2022-06-30 RX ADMIN — CYANOCOBALAMIN 1000 MCG: 1000 INJECTION, SOLUTION INTRAMUSCULAR; SUBCUTANEOUS at 08:59

## 2022-07-25 ENCOUNTER — TRANSFERRED RECORDS (OUTPATIENT)
Dept: HEALTH INFORMATION MANAGEMENT | Facility: CLINIC | Age: 77
End: 2022-07-25

## 2022-07-28 ENCOUNTER — ALLIED HEALTH/NURSE VISIT (OUTPATIENT)
Dept: FAMILY MEDICINE | Facility: CLINIC | Age: 77
End: 2022-07-28
Payer: MEDICARE

## 2022-07-28 DIAGNOSIS — E53.8 VITAMIN B12 DEFICIENCY (NON ANEMIC): Primary | ICD-10-CM

## 2022-07-28 PROCEDURE — 96372 THER/PROPH/DIAG INJ SC/IM: CPT | Performed by: INTERNAL MEDICINE

## 2022-07-28 PROCEDURE — 99207 PR NO CHARGE NURSE ONLY: CPT

## 2022-07-28 RX ADMIN — CYANOCOBALAMIN 1000 MCG: 1000 INJECTION, SOLUTION INTRAMUSCULAR; SUBCUTANEOUS at 09:03

## 2022-08-25 ENCOUNTER — ALLIED HEALTH/NURSE VISIT (OUTPATIENT)
Dept: FAMILY MEDICINE | Facility: CLINIC | Age: 77
End: 2022-08-25
Payer: MEDICARE

## 2022-08-25 DIAGNOSIS — E53.8 VITAMIN B12 DEFICIENCY (NON ANEMIC): Primary | ICD-10-CM

## 2022-08-25 PROCEDURE — 99207 PR NO CHARGE NURSE ONLY: CPT

## 2022-08-25 PROCEDURE — 96372 THER/PROPH/DIAG INJ SC/IM: CPT | Performed by: INTERNAL MEDICINE

## 2022-08-25 RX ADMIN — CYANOCOBALAMIN 1000 MCG: 1000 INJECTION, SOLUTION INTRAMUSCULAR; SUBCUTANEOUS at 08:57

## 2022-08-30 ENCOUNTER — TRANSFERRED RECORDS (OUTPATIENT)
Dept: HEALTH INFORMATION MANAGEMENT | Facility: CLINIC | Age: 77
End: 2022-08-30

## 2022-09-22 ENCOUNTER — TRANSFERRED RECORDS (OUTPATIENT)
Dept: HEALTH INFORMATION MANAGEMENT | Facility: CLINIC | Age: 77
End: 2022-09-22

## 2022-09-22 ENCOUNTER — ALLIED HEALTH/NURSE VISIT (OUTPATIENT)
Dept: FAMILY MEDICINE | Facility: CLINIC | Age: 77
End: 2022-09-22
Payer: MEDICARE

## 2022-09-22 DIAGNOSIS — Z23 ENCOUNTER FOR IMMUNIZATION: Primary | ICD-10-CM

## 2022-09-22 PROCEDURE — 99207 PR NO CHARGE NURSE ONLY: CPT

## 2022-09-22 PROCEDURE — 96372 THER/PROPH/DIAG INJ SC/IM: CPT | Performed by: INTERNAL MEDICINE

## 2022-09-22 RX ADMIN — CYANOCOBALAMIN 1000 MCG: 1000 INJECTION, SOLUTION INTRAMUSCULAR; SUBCUTANEOUS at 08:52

## 2022-09-24 ENCOUNTER — HEALTH MAINTENANCE LETTER (OUTPATIENT)
Age: 77
End: 2022-09-24

## 2022-10-06 ENCOUNTER — TRANSFERRED RECORDS (OUTPATIENT)
Dept: HEALTH INFORMATION MANAGEMENT | Facility: CLINIC | Age: 77
End: 2022-10-06

## 2022-10-20 ENCOUNTER — ALLIED HEALTH/NURSE VISIT (OUTPATIENT)
Dept: FAMILY MEDICINE | Facility: CLINIC | Age: 77
End: 2022-10-20
Payer: MEDICARE

## 2022-10-20 DIAGNOSIS — E53.8 VITAMIN B12 DEFICIENCY (NON ANEMIC): Primary | ICD-10-CM

## 2022-10-20 PROCEDURE — 99207 PR NO CHARGE NURSE ONLY: CPT

## 2022-10-20 PROCEDURE — 96372 THER/PROPH/DIAG INJ SC/IM: CPT | Performed by: INTERNAL MEDICINE

## 2022-10-20 RX ADMIN — CYANOCOBALAMIN 1000 MCG: 1000 INJECTION, SOLUTION INTRAMUSCULAR; SUBCUTANEOUS at 08:49

## 2022-10-20 NOTE — PROGRESS NOTES
Clinic Administered Medication Documentation    Administrations This Visit     cyanocobalamin injection 1,000 mcg     Admin Date  10/20/2022 Action  Given Dose  1,000 mcg Route  Intramuscular Site  Right Deltoid Administered By  Her Heather Mcneil RN    Ordering Provider: Be Maria MD    Patient Supplied?: No                  Injectable Medication Documentation    Patient was given Cyanocobalamin (B-12). Prior to medication administration, verified patients identity using patient s name and date of birth. Please see MAR and medication order for additional information. Patient instructed to remain in clinic for 15 minutes and report any adverse reaction to staff immediately .      Was entire vial of medication used? Yes  Vial/Syringe: Single dose vial  Expiration Date:  03/2024  Was this medication supplied by the patient? No    Name of provider who requested the medication administration:    Name of provider on site (faculty or community preceptor) at the time of performing the medication administration: Dr. Forman     Date of next administration: 11/19/22  Date of next office visit with provider to renew medication plan (must be seen annually): 10/27/22

## 2022-10-27 ENCOUNTER — OFFICE VISIT (OUTPATIENT)
Dept: INTERNAL MEDICINE | Facility: CLINIC | Age: 77
End: 2022-10-27
Payer: MEDICARE

## 2022-10-27 VITALS
HEART RATE: 84 BPM | DIASTOLIC BLOOD PRESSURE: 78 MMHG | SYSTOLIC BLOOD PRESSURE: 138 MMHG | WEIGHT: 197 LBS | BODY MASS INDEX: 32.78 KG/M2 | OXYGEN SATURATION: 97 %

## 2022-10-27 DIAGNOSIS — E53.8 VITAMIN B12 DEFICIENCY (NON ANEMIC): ICD-10-CM

## 2022-10-27 DIAGNOSIS — G62.9 NEUROPATHY: ICD-10-CM

## 2022-10-27 DIAGNOSIS — Z79.899 ENCOUNTER FOR LONG-TERM (CURRENT) USE OF MEDICATIONS: Primary | ICD-10-CM

## 2022-10-27 DIAGNOSIS — K21.9 GASTROESOPHAGEAL REFLUX DISEASE WITHOUT ESOPHAGITIS: ICD-10-CM

## 2022-10-27 DIAGNOSIS — I10 PRIMARY HYPERTENSION: ICD-10-CM

## 2022-10-27 DIAGNOSIS — R73.03 PREDIABETES: ICD-10-CM

## 2022-10-27 LAB
ANION GAP SERPL CALCULATED.3IONS-SCNC: 10 MMOL/L (ref 7–15)
BUN SERPL-MCNC: 18.7 MG/DL (ref 8–23)
CALCIUM SERPL-MCNC: 9.2 MG/DL (ref 8.8–10.2)
CHLORIDE SERPL-SCNC: 104 MMOL/L (ref 98–107)
CREAT SERPL-MCNC: 1.37 MG/DL (ref 0.67–1.17)
DEPRECATED HCO3 PLAS-SCNC: 27 MMOL/L (ref 22–29)
GFR SERPL CREATININE-BSD FRML MDRD: 53 ML/MIN/1.73M2
GLUCOSE SERPL-MCNC: 108 MG/DL (ref 70–99)
HBA1C MFR BLD: 6.1 % (ref 0–5.6)
POTASSIUM SERPL-SCNC: 4 MMOL/L (ref 3.4–5.3)
SODIUM SERPL-SCNC: 141 MMOL/L (ref 136–145)

## 2022-10-27 PROCEDURE — 83036 HEMOGLOBIN GLYCOSYLATED A1C: CPT | Performed by: INTERNAL MEDICINE

## 2022-10-27 PROCEDURE — 99213 OFFICE O/P EST LOW 20 MIN: CPT | Performed by: INTERNAL MEDICINE

## 2022-10-27 PROCEDURE — 36415 COLL VENOUS BLD VENIPUNCTURE: CPT | Performed by: INTERNAL MEDICINE

## 2022-10-27 PROCEDURE — 80048 BASIC METABOLIC PNL TOTAL CA: CPT | Performed by: INTERNAL MEDICINE

## 2022-10-27 RX ORDER — CYANOCOBALAMIN 1000 UG/ML
1000 INJECTION, SOLUTION INTRAMUSCULAR; SUBCUTANEOUS
Status: ACTIVE | OUTPATIENT
Start: 2022-12-20 | End: 2023-12-14

## 2022-10-27 NOTE — PROGRESS NOTES
Office Visit - Follow Up   Mickey Desouza   77 year old male    Date of Visit: 10/27/2022    Chief Complaint   Patient presents with     Follow Up     6 mo follow up.         -------------------------------------------------------------------------------------------------------------------------  Assessment and Plan    Follow-up for several issues  77-year-old man, retired  and     Overall Mickey is doing well.  He needs a renewal of his monthly B12 injections, already scheduled for November 17, so I have entered an order for him to get monthly injections approximately the 20th of each month, commencing December 20, 2022.    We will check a few routine lab test for Mickey, which will be a basic metabolic panel, A1c to monitor prediabetes.    I suggested a dentist consider flu shot and bivalent COVID-19 booster, but he declines those at this time.    Blood pressure looks pretty good today.    I did suggest to Mickey that he consider experimenting with shifting the timing of his losartan hydrochlorothiazide, maybe take it first thing in the morning, maybe sometimes take it a little later in the day.  Probably not at night because the hydrochlorothiazide diuretic component could produce some urination, and we would not want to interrupt his sleep.    He also had new symptoms reported to me today:    Loose stools, gas, sometimes diarrhea, which tends to occur after breakfast.  Consider possibility of lactose intolerance.  Mickey told me he often enjoys cereal with milk in the morning, but then couple hours later will get gas, cramps, sometimes diarrhea.  He also sometimes eats ice cream.  I suggested that he at least experiment with cutting out the lactose milk sugar, which can be done by either eliminating that food product, or using lactose reduced milk or lactase enzyme tablets    I also suggested that he could experiment with reducing his omeprazole dose, to see if that helps    Chronic  laryngitis/globus sensation from his known acid reflux and hiatal hernia  He had a helpful consultation with otolaryngology Dr. Bradley March 9, 2022 because of chronic throat pain  Dr. Bradley's observed chronic laryngitis on fiberoptic laryngoscopy, and recommended that Mickey get more aggressive with antigastroesophageal reflux therapy Mickey subsequently increased his omeprazole dose to 40 mg daily.      I also reminded him of the importance of not lying down within 2 hours of eating  He told me it has been a habit to snack after supper, and then lie down in his recliner chair.  He needs to fix that habit.    Gastroesophageal reflux and hiatal hernia, will have upper endoscopy scheduled for June 8, 2022 at [facility], on high-dose omeprazole 40 mg daily as of April 2022  History of Norton's esophagus negative for dysplasia on most recent endoscopy October 28, 2020.      Pancreatic cyst, surveillance upper endoscopy ultrasound on November 11, 2021,  Dr. Fidel Salter  An anechoic lesion suggestive of a cyst was identified in the pancreatic body. The lesion measured 24 mm by 13 mm in maximal cross-sectional diameter. There was a single compartment without septae. The outer wall of the lesion was not seen. There was no associated mass. There was no internal debris within the fluid-filled cavity.  - A cystic lesion was seen in the pancreatic body. Tissue has not been obtained. 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  - No specimens collected.     Plan is for annual surveillance     History of recurrent bouts of pancreatitis 1989, 1990, 96, 2008, with a 1.1 x 1.9 cm pancreatic cyst most recently imaged by CT October 19, 2018, stable in size.  He told me that alcohol consumption is 0.     Peripheral neuropathy with numbness in his feet, also restless leg syndrome, has been working with neurologist Dr. Adame, and is currently  on gabapentin 200 mg taken twice each evening at 6 PM and 9 PM     He has undergone EMG which demonstrated neuropathy.  Laboratory work did not identify an underlying cause.  He experiences pins-and-needles dysesthesias.  Ferritin level was normal.  Past trial of Cymbalta did not produce much relief.      He does find gabapentin helpful and has found a dose that seems to give him adequate relief at 200 mg at 6 PM, and 200 mg at 9 PM.     Trial of topiramate was unsuccessful because of intolerable side effects of sleepiness, and therefore discontinued yesterday October 25, 2021     Lumbar spinal stenosis, for which has been getting epidural steroid injections approximately twice a year.    He recalls last epidural steroid injection was in June 2021, which gave him relief for a couple months, but he is hurting again.     He is working with a chiropractor twice a week  But I think he might benefit from some additional physical therapy.  [facility] [place], slight entered external referral.     He told me that he might use 1 or 2 tramadol tablets a week for his back.  I reminded him to minimize the use of the tramadol, because it can contribute to constipation.  I will renew his prescription today for 30 tablets.     Essential hypertension, blood pressure nicely controlled on combination of losartan 50 mg a day with hydrochlorothiazide 12.5 mg a day.  Goal blood pressure is 120/80.    BP Readings from Last 6 Encounters:   10/27/22 138/78   04/26/22 122/70   02/18/22 110/60   11/05/21 118/76   10/26/21 116/76   04/21/21 124/70     lipids good, no medicine November 2020  Cholesterol      <=199 mg/dL   157   Triglycerides    <=149 mg/dL   133   Direct Measure HDL   >=40 mg/dL     44   LDL Cholesterol Calculated    <=129 mg/dL   86      Chronic kidney disease stage II with GFR of 59 and creatinine of approximately 1.2 most recently measured 2-     Benign prostatic hyperplasia with  urinary frequency, Flomax was ineffective, PSA was quite satisfactory only 1.0 measured November 20, 2020.  He told me that he empties his bladder completely, and that is the most important thing.     Prediabetes in the context of obesity  4-  Hemoglobin A1C 0.0 - 5.6 % 5.9 High       10-  Hemoglobin A1C 0.0 - 5.6 % 5.8 High       Obesity with body mass index of 32.4.  I would like to see him lose 20 pounds this year.  I reminded him about the importance of eating slower, controlling portion size, and identifying problem foods to curtail or eliminate, especially starches, sweets, fried foods.     Wt Readings from Last 5 Encounters:   10/27/22 89.4 kg (197 lb)   04/26/22 91.2 kg (201 lb)   02/18/22 88.5 kg (195 lb)   11/05/21 90 kg (198 lb 8 oz)   10/26/21 88.9 kg (195 lb 14.4 oz)     Chronic neck pain.  Similar to his back, he needs to work on strengthening her upper back muscles also deep neck muscles, sleep on a properly supportive pillow, and maintain mobility and flexibility.      History of pernicious anemia taking vitamin B12 injections monthly     Status post right total knee arthroplasty, initially 2004, redo in 2009, now doing well.     Status post cataract surgery both eyes 2018, dry eye syndrome recently prescribed Restasis drops.     Constipation, he told me that he might go 2 to 3 days without a bowel movement.  I reminded him to minimize the tramadol opioid.  He asked about Linzess, but I think he needs to first go with the first line management which would be MiraLAX (polyethylene glycol powder).  I told him that he could take 1 capful of MiraLAX powder a day, diluted into his favorite beverage.     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     Vaccinated and boosted X1 for COVID, third shot October 26, 2021 October 27, 2022, Mickey opts out of flu vaccine and bivalent COVID-19 booster.  Immunization History   Administered Date(s) Administered     COVID-19,PF,Pfizer (12+ Yrs) 02/12/2021, 03/05/2021, 10/26/2021     Td (Adult), Adsorbed 01/01/2006     Tdap (Adacel,Boostrix) 08/17/2011     --------------------------------------------------------------------------------------------------------------------------  History of Present Illness  This 77 year old old     Follow-up for several issues  77-year-old man, retired  and     Overall Mickey is doing well.  He needs a renewal of his monthly B12 injections, already scheduled for November 17, so I have entered an order for him to get monthly injections approximately the 20th of each month, commencing December 20, 2022.    We will check a few routine lab test for Mickey, which will be a basic metabolic panel, A1c to monitor prediabetes.    I suggested a dentist consider flu shot and bivalent COVID-19 booster, but he declines those at this time.    Blood pressure looks pretty good today.    I did suggest to Mickey that he consider experimenting with shifting the timing of his losartan hydrochlorothiazide, maybe take it first thing in the morning, maybe sometimes take it a little later in the day.  Probably not at night because the hydrochlorothiazide diuretic component could produce some urination, and we would not want to interrupt his sleep.    He also had new symptoms reported to me today:    Loose stools, gas, sometimes diarrhea, which tends to occur after breakfast.  Consider possibility of lactose intolerance.  Mickey told me he often enjoys cereal with milk in the morning, but then couple hours later will get gas, cramps, sometimes diarrhea.  He also sometimes eats ice cream.  I suggested that  he at least experiment with cutting out the lactose milk sugar, which can be done by either eliminating that food product, or using lactose reduced milk or lactase enzyme tablets    I also suggested that he could experiment with reducing his omeprazole dose, to see if that helps    Wt Readings from Last 3 Encounters:   10/27/22 89.4 kg (197 lb)   04/26/22 91.2 kg (201 lb)   02/18/22 88.5 kg (195 lb)     BP Readings from Last 3 Encounters:   10/27/22 138/78   04/26/22 122/70   02/18/22 110/60       ---------------------------------------------------------------------------------------------------------------------------    Medications, Allergies, Social, and Problem List   Current Outpatient Medications   Medication Sig Dispense Refill     aspirin (ASA) 81 MG chewable tablet   Instructions: Take 81 mg by mouth daily., Type: Maintenance       gabapentin (NEURONTIN) 100 MG capsule 200 mg at 6:00 PM, and 200 mg at 9:00  capsule 3     glucosamine-chondroitin 500-400 MG CAPS per capsule   Instructions: Take 1 capsule by mouth 2 (two) times a day., Type: Maintenance       losartan-hydrochlorothiazide (HYZAAR) 50-12.5 MG tablet Take 1 tablet by mouth daily 90 tablet 3     Multiple Vitamins-Minerals (DAILY MULTIVITAMIN PO)   Instructions: Take 1 tablet by mouth daily., Type: Maintenance       omeprazole (PRILOSEC) 40 MG DR capsule Take 1 capsule by mouth 2 times daily       traMADol (ULTRAM) 50 MG tablet Take 1 tablet (50 mg) by mouth every 6 hours as needed for breakthrough pain 30 tablet 0     Allergies   Allergen Reactions     Ciprofloxacin Other (See Comments)     Codeine      Lisinopril Cough     Morphine      Social History     Tobacco Use     Smoking status: Never     Smokeless tobacco: Never   Vaping Use     Vaping Use: Never used   Substance Use Topics     Alcohol use: Not Currently     Comment: Alcoholic Drinks/day: occasionally, beer     Drug use: No     Patient Active Problem List   Diagnosis     Urinary  frequency     Restless legs syndrome     Neuropathy     Neuropathic pain     Neck pain, chronic     Hypertension     Allergic rhinitis     Burning sensation of feet     Gastroesophageal reflux disease     Obesity     Pancreatic cyst     Spinal stenosis, lumbar region, with neurogenic claudication        Reviewed, reconciled and updated       Physical Exam   General Appearance:       /78   Pulse 84   Wt 89.4 kg (197 lb)   SpO2 97%   BMI 32.78 kg/m      Appears well     Additional Information   I spent 20 minutes on this encounter, including reviewing interval history since last visit, examining the patient, explaining and counseling the issues enumerated in the Assessment and Plan (patient given a copy), ordering indicated tests, ordering prescriptions       BORA AC MD, MD

## 2022-11-15 ENCOUNTER — TRANSFERRED RECORDS (OUTPATIENT)
Dept: HEALTH INFORMATION MANAGEMENT | Facility: CLINIC | Age: 77
End: 2022-11-15

## 2022-11-17 ENCOUNTER — ALLIED HEALTH/NURSE VISIT (OUTPATIENT)
Dept: FAMILY MEDICINE | Facility: CLINIC | Age: 77
End: 2022-11-17
Payer: MEDICARE

## 2022-11-17 DIAGNOSIS — E53.8 VITAMIN B12 DEFICIENCY (NON ANEMIC): Primary | ICD-10-CM

## 2022-11-17 PROCEDURE — 96372 THER/PROPH/DIAG INJ SC/IM: CPT | Performed by: INTERNAL MEDICINE

## 2022-11-17 PROCEDURE — 99207 PR NO CHARGE NURSE ONLY: CPT

## 2022-11-17 RX ORDER — CYANOCOBALAMIN 1000 UG/ML
1000 INJECTION, SOLUTION INTRAMUSCULAR; SUBCUTANEOUS
Status: CANCELLED | OUTPATIENT
Start: 2022-11-17 | End: 2023-11-12

## 2022-11-17 RX ADMIN — CYANOCOBALAMIN 1000 MCG: 1000 INJECTION, SOLUTION INTRAMUSCULAR; SUBCUTANEOUS at 08:49

## 2022-11-17 NOTE — PROGRESS NOTES
Pt coming in today for B12.       For future administrations, please sign ginger'd up order. Please close encounter once finished.       Thank you!    Yang Mak Jr., CMA on 11/17/2022 at 8:54 AM

## 2022-12-15 ENCOUNTER — ALLIED HEALTH/NURSE VISIT (OUTPATIENT)
Dept: FAMILY MEDICINE | Facility: CLINIC | Age: 77
End: 2022-12-15
Payer: MEDICARE

## 2022-12-15 DIAGNOSIS — E53.8 VITAMIN B12 DEFICIENCY (NON ANEMIC): Primary | ICD-10-CM

## 2022-12-15 PROCEDURE — 96372 THER/PROPH/DIAG INJ SC/IM: CPT | Performed by: INTERNAL MEDICINE

## 2022-12-15 PROCEDURE — 99207 PR NO CHARGE NURSE ONLY: CPT

## 2022-12-15 RX ADMIN — CYANOCOBALAMIN 1000 MCG: 1000 INJECTION, SOLUTION INTRAMUSCULAR; SUBCUTANEOUS at 09:49

## 2022-12-21 ENCOUNTER — TRANSFERRED RECORDS (OUTPATIENT)
Dept: HEALTH INFORMATION MANAGEMENT | Facility: CLINIC | Age: 77
End: 2022-12-21

## 2022-12-29 DIAGNOSIS — I10 ESSENTIAL (PRIMARY) HYPERTENSION: ICD-10-CM

## 2022-12-30 RX ORDER — LOSARTAN POTASSIUM AND HYDROCHLOROTHIAZIDE 12.5; 5 MG/1; MG/1
TABLET ORAL
Qty: 90 TABLET | Refills: 3 | Status: SHIPPED | OUTPATIENT
Start: 2022-12-30 | End: 2023-10-31

## 2022-12-30 NOTE — TELEPHONE ENCOUNTER
"Routing refill request to provider for review/approval because:  Labs out of range:  cr    Last Written Prescription Date:  10/26/21  Last Fill Quantity: 90,  # refills: 1   Last office visit provider:  10/27/22     Requested Prescriptions   Pending Prescriptions Disp Refills     losartan-hydrochlorothiazide (HYZAAR) 50-12.5 MG tablet [Pharmacy Med Name: LOSARTAN-HCTZ 50-12.5 MG TAB] 90 tablet 3     Sig: TAKE 1 TABLET BY MOUTH EVERY DAY       Angiotensin-II Receptors Failed - 12/29/2022  8:46 AM        Failed - Normal serum creatinine on file in past 12 months     Recent Labs   Lab Test 10/27/22  0748   CR 1.37*       Ok to refill medication if creatinine is low          Passed - Last blood pressure under 140/90 in past 12 months     BP Readings from Last 3 Encounters:   10/27/22 138/78   04/26/22 122/70   02/18/22 110/60                 Passed - Recent (12 mo) or future (30 days) visit within the authorizing provider's specialty     Patient has had an office visit with the authorizing provider or a provider within the authorizing providers department within the previous 12 mos or has a future within next 30 days. See \"Patient Info\" tab in inbasket, or \"Choose Columns\" in Meds & Orders section of the refill encounter.              Passed - Medication is active on med list        Passed - Patient is age 18 or older        Passed - Normal serum potassium on file in past 12 months     Recent Labs   Lab Test 10/27/22  0748   POTASSIUM 4.0                   Diuretics (Including Combos) Protocol Failed - 12/29/2022  8:46 AM        Failed - Normal serum creatinine on file in past 12 months     Recent Labs   Lab Test 10/27/22  0748   CR 1.37*              Passed - Blood pressure under 140/90 in past 12 months     BP Readings from Last 3 Encounters:   10/27/22 138/78   04/26/22 122/70   02/18/22 110/60                 Passed - Recent (12 mo) or future (30 days) visit within the authorizing provider's specialty     Patient " "has had an office visit with the authorizing provider or a provider within the authorizing providers department within the previous 12 mos or has a future within next 30 days. See \"Patient Info\" tab in inbasket, or \"Choose Columns\" in Meds & Orders section of the refill encounter.              Passed - Medication is active on med list        Passed - Patient is age 18 or older        Passed - Normal serum potassium on file in past 12 months     Recent Labs   Lab Test 10/27/22  0748   POTASSIUM 4.0                    Passed - Normal serum sodium on file in past 12 months     Recent Labs   Lab Test 10/27/22  0748                      Malena Lawrence RN 12/29/22 7:17 PM  "

## 2023-01-12 ENCOUNTER — ALLIED HEALTH/NURSE VISIT (OUTPATIENT)
Dept: FAMILY MEDICINE | Facility: CLINIC | Age: 78
End: 2023-01-12
Payer: MEDICARE

## 2023-01-12 DIAGNOSIS — E53.8 VITAMIN B12 DEFICIENCY (NON ANEMIC): Primary | ICD-10-CM

## 2023-01-12 PROCEDURE — 96372 THER/PROPH/DIAG INJ SC/IM: CPT | Performed by: INTERNAL MEDICINE

## 2023-01-12 PROCEDURE — 99207 PR NO CHARGE NURSE ONLY: CPT

## 2023-01-12 RX ADMIN — CYANOCOBALAMIN 1000 MCG: 1000 INJECTION, SOLUTION INTRAMUSCULAR; SUBCUTANEOUS at 08:33

## 2023-01-12 NOTE — PROGRESS NOTES
Clinic Administered Medication Documentation    Administrations This Visit     cyanocobalamin injection 1,000 mcg     Admin Date  01/12/2023 Action  Given Dose  1,000 mcg Route  Intramuscular Site  Right Deltoid Administered By  Her Heather Mcneil RN    Ordering Provider: Be Maria MD    Patient Supplied?: No                  Injectable Medication Documentation    Patient was given Cyanocobalamin (B-12). Prior to medication administration, verified patients identity using patient s name and date of birth. Please see MAR and medication order for additional information. Patient instructed to remain in clinic for 15 minutes and report any adverse reaction to staff immediately .      Was entire vial of medication used? Yes  Vial/Syringe: Single dose vial  Expiration Date:  06/2024  Was this medication supplied by the patient? No    Name of provider who requested the medication administration: Dr. Maria   Name of provider on site (faculty or community preceptor) at the time of performing the medication administration: Dr. Forman     Date of next administration: 2/9/2023  Date of next office visit with provider to renew medication plan (must be seen annually): 4/27/2023

## 2023-01-24 ENCOUNTER — OFFICE VISIT (OUTPATIENT)
Dept: NEUROLOGY | Facility: CLINIC | Age: 78
End: 2023-01-24
Payer: MEDICARE

## 2023-01-24 ENCOUNTER — LAB (OUTPATIENT)
Dept: LAB | Facility: HOSPITAL | Age: 78
End: 2023-01-24
Payer: MEDICARE

## 2023-01-24 VITALS
WEIGHT: 195 LBS | DIASTOLIC BLOOD PRESSURE: 80 MMHG | BODY MASS INDEX: 32.45 KG/M2 | RESPIRATION RATE: 16 BRPM | SYSTOLIC BLOOD PRESSURE: 138 MMHG | HEART RATE: 72 BPM

## 2023-01-24 DIAGNOSIS — M79.2 NEUROPATHIC PAIN: ICD-10-CM

## 2023-01-24 DIAGNOSIS — E61.1 IRON DEFICIENCY ASSOCIATED WITH FAMILIAL RESTLESS LEGS SYNDROME: ICD-10-CM

## 2023-01-24 DIAGNOSIS — G25.81 RESTLESS LEG SYNDROME: ICD-10-CM

## 2023-01-24 DIAGNOSIS — G25.81 IRON DEFICIENCY ASSOCIATED WITH FAMILIAL RESTLESS LEGS SYNDROME: ICD-10-CM

## 2023-01-24 DIAGNOSIS — G62.9 NEUROPATHY: Primary | ICD-10-CM

## 2023-01-24 DIAGNOSIS — G62.9 NEUROPATHY: ICD-10-CM

## 2023-01-24 LAB
FERRITIN SERPL-MCNC: 81 NG/ML (ref 31–409)
TOTAL PROTEIN SERUM FOR ELP: 7.6 G/DL (ref 6.4–8.3)
VIT B12 SERPL-MCNC: 962 PG/ML (ref 232–1245)

## 2023-01-24 PROCEDURE — 86334 IMMUNOFIX E-PHORESIS SERUM: CPT | Mod: 26

## 2023-01-24 PROCEDURE — 86334 IMMUNOFIX E-PHORESIS SERUM: CPT | Performed by: PATHOLOGY

## 2023-01-24 PROCEDURE — 84155 ASSAY OF PROTEIN SERUM: CPT

## 2023-01-24 PROCEDURE — 84425 ASSAY OF VITAMIN B-1: CPT

## 2023-01-24 PROCEDURE — 99214 OFFICE O/P EST MOD 30 MIN: CPT | Performed by: PSYCHIATRY & NEUROLOGY

## 2023-01-24 PROCEDURE — 82728 ASSAY OF FERRITIN: CPT

## 2023-01-24 PROCEDURE — 84165 PROTEIN E-PHORESIS SERUM: CPT | Mod: TC | Performed by: PATHOLOGY

## 2023-01-24 PROCEDURE — 82607 VITAMIN B-12: CPT

## 2023-01-24 PROCEDURE — 84165 PROTEIN E-PHORESIS SERUM: CPT | Mod: 26

## 2023-01-24 PROCEDURE — 36415 COLL VENOUS BLD VENIPUNCTURE: CPT

## 2023-01-24 RX ORDER — OMEPRAZOLE 40 MG/1
40 CAPSULE, DELAYED RELEASE ORAL DAILY
COMMUNITY

## 2023-01-24 RX ORDER — GABAPENTIN 100 MG/1
CAPSULE ORAL
Qty: 630 CAPSULE | Refills: 3 | Status: SHIPPED | OUTPATIENT
Start: 2023-01-24 | End: 2023-07-25

## 2023-01-24 NOTE — PROGRESS NOTES
NEUROLOGY OUTPATIENT PROGRESS NOTE  Jan 24, 2023     CHIEF COMPLAINT/REASON FOR VISIT/REASON FOR CONSULT  Patient presents with:  Follow Up    REASON FOR CONSULTATION- Neuropathy/Neuropathic pain    HISTORY OF PRESENT ILLNESS  Mickey Desouza is a 77 year old male seen for  numbness in the feet and neuropathic pain. EMG had shown a neuropathy. Blood work was negative for causes of neuropathy. He has pins-and-needles and pain for which he is using gabapentin which she does find benefit. He also reports some sensation to move his legs to get comfortable at nighttime. Ferritin came back normal. In the past we had tried Cymbalta which she only tried for little bit before his primary switched him back to the gabapentin. He has also been prescribed Lyrica in the past but could not afford it because of cost. Pain starts coming on in the evening time. Reports that sometimes the pain wakes him up in the nighttime.    He was supposed to be on gabapentin to 100 mg at 4  mg at 7:30  mg at 10:30  mg at 12:30 PM to prevent the pain from happening early in the evening and later in the morning. He is not taking the 4:00 dose. He reports that the numbness is about the same. Numbness is limited to below the ankles. Pain has been about the same maybe slightly better. Though he does have good days and bad days. Bad days about once a week. Overall things have been working well for him. Pain can be more sharp in the feet. Pain is unchanged in nature. Walking does make the pain worse for him. Denies any other neurological symptoms.    9/30/20  Patient returns today.  He reports that he continues to have numbness in his feet.  Has not progressed.  Continues to have problems with neuropathic pain.  1 day he started having the pain earlier in the daytime.  Otherwise the pain mainly comes in the evening.  He is tried taking the gabapentin 4 times as previously discussed but that has not really helped.  Currently is taking  300 mg of gabapentin around 7 PM and then 300 mg at 9 PM.  This is the best combination is found.  Denies any side effects to the gabapentin.  Previously was having some neck pain which has improved.  He also has spinal stenosis and has been working with his doctor regarding spinal stenosis.  He further reports issues with knee pain and has been getting some injections for that.  Has been exercising 3 times a week.  This is for 45 minutes.    3/23/21  Patient returns today.  Continues to have numbness in his feet.  This has not progressed.  Continues to have neuropathic pain.  Has reduce the dose of gabapentin 100 mg at 7 PM and 100 mg in 9 PM with no worsening of his symptoms.  Overall pain is not under good control but he is satisfied with where things are.  He did watching television about some electrical stimulation of the legs which can help with nerve damage.  Discussed with him that there is no clear evidence that this will help.  He further reports that he has been exercising.  He is eating healthy.  He is social distancing.  He has had the second Covid shot 3 weeks ago.  No side effects.    1/24/23  Patient returns today.  Was last seen about 2 years ago.  Does complain of worsening numbness in his feet.  Also has worsening neuropathic pain.  Feels that some of his symptoms might be spreading to his hands.  Has occasionally been trying to take 700 mg of gabapentin in in the evening.  Has not not really gained weight though has been diagnosed with prediabetes.  Is eating a lot of sweets.  Exercises about the same.  No other clear cause for nerve damage.  Is getting B12 injections as well.    Previous history is reviewed and this is unchanged.    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     BPH (benign prostatic hyperplasia)      Cancer (H)      Chronic kidney disease     Stage 3     GERD (gastroesophageal reflux disease)       Hypertension 3/23/2021     Hypertension      Neuropathy      Osteoarthritis      Pancreatic cyst      Pancreatitis, recurrent      Patient Active Problem List   Diagnosis     Urinary frequency     Restless legs syndrome     Neuropathy     Neuropathic pain     Neck pain, chronic     Hypertension     Allergic rhinitis     Burning sensation of feet     Gastroesophageal reflux disease     Obesity     Pancreatic cyst     Spinal stenosis, lumbar region, with neurogenic claudication   Hx of carpal tunnel surgery bilaterally      FAMILY HISTORY  Family History   Problem Relation Age of Onset     Coronary Artery Disease Father      Pancreatic Cancer Mother      Breast Cancer Sister      Liver Disease Brother      Other - See Comments Sister         head bleed after fall     Other - See Comments Brother      Neuropathy Brother      Diabetes Brother      Diabetes Brother      Other - See Comments Brother         WWII     No Known Problems Son    Heart attack: Father.      SOCIAL HISTORY  Social History     Tobacco Use     Smoking status: Never     Smokeless tobacco: Never   Vaping Use     Vaping Use: Never used   Substance Use Topics     Alcohol use: Not Currently     Comment: Alcoholic Drinks/day: occasionally, beer     Drug use: No       SYSTEMS REVIEW  Twelve-system ROS was done and other than the HPI this was negative.   No new concerns/issues    MEDICATIONS  aspirin (ASA) 81 MG chewable tablet,   Instructions: Take 81 mg by mouth daily., Type: Maintenance  gabapentin (NEURONTIN) 100 MG capsule, 200 mg at 6:00 PM, and 200 mg at 9:00 PM  glucosamine-chondroitin 500-400 MG CAPS per capsule,   Instructions: Take 1 capsule by mouth 2 (two) times a day., Type: Maintenance  losartan-hydrochlorothiazide (HYZAAR) 50-12.5 MG tablet, TAKE 1 TABLET BY MOUTH EVERY DAY  Multiple Vitamins-Minerals (DAILY MULTIVITAMIN PO),   Instructions: Take 1 tablet by mouth daily., Type: Maintenance  omeprazole (PRILOSEC) 40 MG DR capsule, Take 40 mg  by mouth 2 times daily  omeprazole (PRILOSEC) 40 MG DR capsule, Take 1 capsule by mouth 2 times daily    cyanocobalamin injection 1,000 mcg  cyanocobalamin injection 1,000 mcg         PHYSICAL EXAMINATION  VITALS: /80   Pulse 72   Resp 16   Wt 88.5 kg (195 lb)   BMI 32.45 kg/m    GENERAL: Healthy appearing, alert, no acute distress, normal habitus.   NEUROLOGICAL: Patient is awake and oriented to self, place and time. Attention span is normal. Memory is intact. Language is fluent and follows commands appropriately. Appropriate fund of knowledge. Cranial nerves 2-12 are intact. There is no pronator drift. Motor exam shows 5/5 strength in all extremities tested proximally. Tone is symmetric bilaterally in upper and lower extremities. Reflexes are symmetric and 2+ in upper extremities and absent in lower extremities. Sensory exam shows decreased pin prick to mid shin level/above ankle level.  Decreased vibration up to the knees.  Finger to nose and heel to shin is without dysmetria. Romberg is negative. Gait is normal. Patient is unsteady with walking tandem.  Exam similar to before.  Questionably worse.    DIAGNOSTICS  EMG  CLINICAL INTERPRETATION:  This is a abnormal nerve conduction and EMG study of both lower extremities. The study is consistent with a peripheral neuropathy. Further clinical correlation is needed.     RELEVANT LABS  Ferritin 72  Glucose Level: 105 (12/11/18 07:25:00)   Glucose Fasting: Yes (12/11/18 07:25:00)   TSH Cascade: 1.79 (12/11/18 07:25:00)   Lyme Ab: 0.05 (12/11/18 07:25:00)   SPEP = normal  B12 = normal    Component      Latest Ref Rng & Units 4/26/2022 10/27/2022   TSH      0.30 - 5.00 uIU/mL 1.61    Hemoglobin A1C      0.0 - 5.6 %  6.1 (H)     IMPRESSION/REPORT/PLAN  Neuropathy  Neuropathic pain  Restless leg syndrome  Prediabetes    This is a 77 year old male  idiopathic neuropathy, neuropathic pain, question of restless leg syndrome.  EMG was confirmatory for neuropathy.  He  has not been diagnosed with prediabetes which might be the cause of his neuropathy.  Was worsening neuropathy would repeat do the blood work.  Encourage exercise healthy lifestyle and getting the prediabetes under good control.    For neuropathic pain management Cymbalta has been tried in the past.  Gabapentin has been helpful and will further increase the dose.  He does not want to do Lyrica.    For possible restless leg ferritin was negative.  Unclear diagnosis.  We will recheck ferritin since symptoms are getting worse.  Gabapentin should further help with this.    Return in 6 months.      -     gabapentin (NEURONTIN) 100 MG capsule; 300 mg at 7:00 PM, 200 mg at 9:00 PM and 200 mg at 10:00 PM  -     Vitamin B12; Future  -     Vitamin B1 whole blood; Future  -     Protein Immunofixation Serum; Future  -     Protein electrophoresis; Future  -     Ferritin; Future    Over 30 minutes were spent coordinating the care for the patient on the day of the encounter.  This includes previsit, during visit and post visit activities as documented above.  Counseling patient.  Prescription management.  Worsening problem.  Testing ordered/reviewed  (Activities include but not inclusive of reviewing chart, reviewing outside records, reviewing labs and imaging study results as well as the images, patient visit time including getting history and exam,  use if applicable, review of test results with the patient and coming up with a plan in a shared model, counseling patient and family, education and answering patient questions, EMR , EMR diagnosis entry and problem list management, medication reconciliation and prescription management if applicable, paperwork if applicable, printing documents and documentation of the visit activities.)      Kulwinder Adame MD  Neurologist  [facility]  Tel:- [phone]    This note was dictated using voice recognition software.  Any  grammatical or context distortions are unintentional and inherent to the software.

## 2023-01-24 NOTE — LETTER
1/24/2023         RE: Mickey Desouza  [address] [id number]        Dear Colleague,    Thank you for referring your patient, Mickey Desouza, to the [facility] [place]. Please see a copy of my visit note below.    NEUROLOGY OUTPATIENT PROGRESS NOTE  Jan 24, 2023     CHIEF COMPLAINT/REASON FOR VISIT/REASON FOR CONSULT  Patient presents with:  Follow Up    REASON FOR CONSULTATION- Neuropathy/Neuropathic pain    HISTORY OF PRESENT ILLNESS  Mickey Desouza is a 77 year old male seen for  numbness in the feet and neuropathic pain. EMG had shown a neuropathy. Blood work was negative for causes of neuropathy. He has pins-and-needles and pain for which he is using gabapentin which she does find benefit. He also reports some sensation to move his legs to get comfortable at nighttime. Ferritin came back normal. In the past we had tried Cymbalta which she only tried for little bit before his primary switched him back to the gabapentin. He has also been prescribed Lyrica in the past but could not afford it because of cost. Pain starts coming on in the evening time. Reports that sometimes the pain wakes him up in the nighttime.    He was supposed to be on gabapentin to 100 mg at 4  mg at 7:30  mg at 10:30  mg at 12:30 PM to prevent the pain from happening early in the evening and later in the morning. He is not taking the 4:00 dose. He reports that the numbness is about the same. Numbness is limited to below the ankles. Pain has been about the same maybe slightly better. Though he does have good days and bad days. Bad days about once a week. Overall things have been working well for him. Pain can be more sharp in the feet. Pain is unchanged in nature. Walking does make the pain worse for him. Denies any other neurological symptoms.    9/30/20  Patient returns today.  He reports that he continues to have numbness in his feet.  Has not progressed.  Continues to  have problems with neuropathic pain.  1 day he started having the pain earlier in the daytime.  Otherwise the pain mainly comes in the evening.  He is tried taking the gabapentin 4 times as previously discussed but that has not really helped.  Currently is taking 300 mg of gabapentin around 7 PM and then 300 mg at 9 PM.  This is the best combination is found.  Denies any side effects to the gabapentin.  Previously was having some neck pain which has improved.  He also has spinal stenosis and has been working with his doctor regarding spinal stenosis.  He further reports issues with knee pain and has been getting some injections for that.  Has been exercising 3 times a week.  This is for 45 minutes.    3/23/21  Patient returns today.  Continues to have numbness in his feet.  This has not progressed.  Continues to have neuropathic pain.  Has reduce the dose of gabapentin 100 mg at 7 PM and 100 mg in 9 PM with no worsening of his symptoms.  Overall pain is not under good control but he is satisfied with where things are.  He did watching television about some electrical stimulation of the legs which can help with nerve damage.  Discussed with him that there is no clear evidence that this will help.  He further reports that he has been exercising.  He is eating healthy.  He is social distancing.  He has had the second Covid shot 3 weeks ago.  No side effects.    1/24/23  Patient returns today.  Was last seen about 2 years ago.  Does complain of worsening numbness in his feet.  Also has worsening neuropathic pain.  Feels that some of his symptoms might be spreading to his hands.  Has occasionally been trying to take 700 mg of gabapentin in in the evening.  Has not not really gained weight though has been diagnosed with prediabetes.  Is eating a lot of sweets.  Exercises about the same.  No other clear cause for nerve damage.  Is getting B12 injections as well.    Previous history is reviewed and this is  unchanged.    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     BPH (benign prostatic hyperplasia)      Cancer (H)      Chronic kidney disease     Stage 3     GERD (gastroesophageal reflux disease)      Hypertension 3/23/2021     Hypertension      Neuropathy      Osteoarthritis      Pancreatic cyst      Pancreatitis, recurrent      Patient Active Problem List   Diagnosis     Urinary frequency     Restless legs syndrome     Neuropathy     Neuropathic pain     Neck pain, chronic     Hypertension     Allergic rhinitis     Burning sensation of feet     Gastroesophageal reflux disease     Obesity     Pancreatic cyst     Spinal stenosis, lumbar region, with neurogenic claudication   Hx of carpal tunnel surgery bilaterally      FAMILY HISTORY  Family History   Problem Relation Age of Onset     Coronary Artery Disease Father      Pancreatic Cancer Mother      Breast Cancer Sister      Liver Disease Brother      Other - See Comments Sister         head bleed after fall     Other - See Comments Brother      Neuropathy Brother      Diabetes Brother      Diabetes Brother      Other - See Comments Brother         WWII     No Known Problems Son    Heart attack: Father.      SOCIAL HISTORY  Social History     Tobacco Use     Smoking status: Never     Smokeless tobacco: Never   Vaping Use     Vaping Use: Never used   Substance Use Topics     Alcohol use: Not Currently     Comment: Alcoholic Drinks/day: occasionally, beer     Drug use: No       SYSTEMS REVIEW  Twelve-system ROS was done and other than the HPI this was negative.   No new concerns/issues    MEDICATIONS  aspirin (ASA) 81 MG chewable tablet,   Instructions: Take 81 mg by mouth daily., Type: Maintenance  gabapentin (NEURONTIN) 100 MG capsule, 200 mg at 6:00 PM, and 200 mg at 9:00 PM  glucosamine-chondroitin 500-400 MG CAPS per capsule,   Instructions: Take 1 capsule by mouth 2 (two) times a  day., Type: Maintenance  losartan-hydrochlorothiazide (HYZAAR) 50-12.5 MG tablet, TAKE 1 TABLET BY MOUTH EVERY DAY  Multiple Vitamins-Minerals (DAILY MULTIVITAMIN PO),   Instructions: Take 1 tablet by mouth daily., Type: Maintenance  omeprazole (PRILOSEC) 40 MG DR capsule, Take 40 mg by mouth 2 times daily  omeprazole (PRILOSEC) 40 MG DR capsule, Take 1 capsule by mouth 2 times daily    cyanocobalamin injection 1,000 mcg  cyanocobalamin injection 1,000 mcg         PHYSICAL EXAMINATION  VITALS: /80   Pulse 72   Resp 16   Wt 88.5 kg (195 lb)   BMI 32.45 kg/m    GENERAL: Healthy appearing, alert, no acute distress, normal habitus.   NEUROLOGICAL: Patient is awake and oriented to self, place and time. Attention span is normal. Memory is intact. Language is fluent and follows commands appropriately. Appropriate fund of knowledge. Cranial nerves 2-12 are intact. There is no pronator drift. Motor exam shows 5/5 strength in all extremities tested proximally. Tone is symmetric bilaterally in upper and lower extremities. Reflexes are symmetric and 2+ in upper extremities and absent in lower extremities. Sensory exam shows decreased pin prick to mid shin level/above ankle level.  Decreased vibration up to the knees.  Finger to nose and heel to shin is without dysmetria. Romberg is negative. Gait is normal. Patient is unsteady with walking tandem.  Exam similar to before.  Questionably worse.    DIAGNOSTICS  EMG  CLINICAL INTERPRETATION:  This is a abnormal nerve conduction and EMG study of both lower extremities. The study is consistent with a peripheral neuropathy. Further clinical correlation is needed.     RELEVANT LABS  Ferritin 72  Glucose Level: 105 (12/11/18 07:25:00)   Glucose Fasting: Yes (12/11/18 07:25:00)   TSH Cascade: 1.79 (12/11/18 07:25:00)   Lyme Ab: 0.05 (12/11/18 07:25:00)   SPEP = normal  B12 = normal    Component      Latest Ref Rng & Units 4/26/2022 10/27/2022   TSH      0.30 - 5.00 uIU/mL 1.61     Hemoglobin A1C      0.0 - 5.6 %  6.1 (H)     IMPRESSION/REPORT/PLAN  Neuropathy  Neuropathic pain  Restless leg syndrome  Prediabetes    This is a 77 year old male  idiopathic neuropathy, neuropathic pain, question of restless leg syndrome.  EMG was confirmatory for neuropathy.  He has not been diagnosed with prediabetes which might be the cause of his neuropathy.  Was worsening neuropathy would repeat do the blood work.  Encourage exercise healthy lifestyle and getting the prediabetes under good control.    For neuropathic pain management Cymbalta has been tried in the past.  Gabapentin has been helpful and will further increase the dose.  He does not want to do Lyrica.    For possible restless leg ferritin was negative.  Unclear diagnosis.  We will recheck ferritin since symptoms are getting worse.  Gabapentin should further help with this.    Return in 6 months.      -     gabapentin (NEURONTIN) 100 MG capsule; 300 mg at 7:00 PM, 200 mg at 9:00 PM and 200 mg at 10:00 PM  -     Vitamin B12; Future  -     Vitamin B1 whole blood; Future  -     Protein Immunofixation Serum; Future  -     Protein electrophoresis; Future  -     Ferritin; Future    Over 30 minutes were spent coordinating the care for the patient on the day of the encounter.  This includes previsit, during visit and post visit activities as documented above.  Counseling patient.  Prescription management.  Worsening problem.  Testing ordered/reviewed  (Activities include but not inclusive of reviewing chart, reviewing outside records, reviewing labs and imaging study results as well as the images, patient visit time including getting history and exam,  use if applicable, review of test results with the patient and coming up with a plan in a shared model, counseling patient and family, education and answering patient questions, EMR , EMR diagnosis entry and problem list management, medication reconciliation and prescription  management if applicable, paperwork if applicable, printing documents and documentation of the visit activities.)      Kulwinder Adame MD  Neurologist  [facility]  Tel:- [phone]    This note was dictated using voice recognition software.  Any grammatical or context distortions are unintentional and inherent to the software.        Again, thank you for allowing me to participate in the care of your patient.        Sincerely,        Kulwinder Adame MD

## 2023-01-25 LAB
ALBUMIN SERPL ELPH-MCNC: 4.4 G/DL (ref 3.7–5.1)
ALPHA1 GLOB SERPL ELPH-MCNC: 0.3 G/DL (ref 0.2–0.4)
ALPHA2 GLOB SERPL ELPH-MCNC: 0.8 G/DL (ref 0.5–0.9)
B-GLOBULIN SERPL ELPH-MCNC: 1 G/DL (ref 0.6–1)
GAMMA GLOB SERPL ELPH-MCNC: 1.1 G/DL (ref 0.7–1.6)
M PROTEIN SERPL ELPH-MCNC: 0 G/DL
PROT PATTERN SERPL ELPH-IMP: NORMAL
PROT PATTERN SERPL IFE-IMP: NORMAL

## 2023-01-30 LAB — VIT B1 PYROPHOSHATE BLD-SCNC: 110 NMOL/L

## 2023-02-09 ENCOUNTER — ALLIED HEALTH/NURSE VISIT (OUTPATIENT)
Dept: FAMILY MEDICINE | Facility: CLINIC | Age: 78
End: 2023-02-09
Payer: MEDICARE

## 2023-02-09 DIAGNOSIS — Z23 ENCOUNTER FOR IMMUNIZATION: Primary | ICD-10-CM

## 2023-02-09 PROCEDURE — 96372 THER/PROPH/DIAG INJ SC/IM: CPT | Performed by: INTERNAL MEDICINE

## 2023-02-09 PROCEDURE — 99207 PR NO CHARGE NURSE ONLY: CPT

## 2023-02-09 RX ADMIN — CYANOCOBALAMIN 1000 MCG: 1000 INJECTION, SOLUTION INTRAMUSCULAR; SUBCUTANEOUS at 09:38

## 2023-03-09 ENCOUNTER — TRANSFERRED RECORDS (OUTPATIENT)
Dept: HEALTH INFORMATION MANAGEMENT | Facility: CLINIC | Age: 78
End: 2023-03-09

## 2023-03-09 ENCOUNTER — ALLIED HEALTH/NURSE VISIT (OUTPATIENT)
Dept: FAMILY MEDICINE | Facility: CLINIC | Age: 78
End: 2023-03-09
Payer: MEDICARE

## 2023-03-09 DIAGNOSIS — E53.8 VITAMIN B12 DEFICIENCY (NON ANEMIC): Primary | ICD-10-CM

## 2023-03-09 PROCEDURE — 99207 PR NO CHARGE NURSE ONLY: CPT

## 2023-03-09 PROCEDURE — 96372 THER/PROPH/DIAG INJ SC/IM: CPT | Performed by: INTERNAL MEDICINE

## 2023-03-09 RX ADMIN — CYANOCOBALAMIN 1000 MCG: 1000 INJECTION, SOLUTION INTRAMUSCULAR; SUBCUTANEOUS at 08:31

## 2023-04-06 ENCOUNTER — ALLIED HEALTH/NURSE VISIT (OUTPATIENT)
Dept: FAMILY MEDICINE | Facility: CLINIC | Age: 78
End: 2023-04-06
Payer: MEDICARE

## 2023-04-06 DIAGNOSIS — E53.8 VITAMIN B12 DEFICIENCY (NON ANEMIC): Primary | ICD-10-CM

## 2023-04-06 PROCEDURE — 96372 THER/PROPH/DIAG INJ SC/IM: CPT | Performed by: INTERNAL MEDICINE

## 2023-04-06 PROCEDURE — 99207 PR NO CHARGE NURSE ONLY: CPT

## 2023-04-06 RX ADMIN — CYANOCOBALAMIN 1000 MCG: 1000 INJECTION, SOLUTION INTRAMUSCULAR; SUBCUTANEOUS at 08:36

## 2023-04-13 ENCOUNTER — TRANSFERRED RECORDS (OUTPATIENT)
Dept: HEALTH INFORMATION MANAGEMENT | Facility: CLINIC | Age: 78
End: 2023-04-13
Payer: MEDICARE

## 2023-04-26 ENCOUNTER — TRANSFERRED RECORDS (OUTPATIENT)
Dept: HEALTH INFORMATION MANAGEMENT | Facility: CLINIC | Age: 78
End: 2023-04-26
Payer: MEDICARE

## 2023-04-27 ENCOUNTER — OFFICE VISIT (OUTPATIENT)
Dept: INTERNAL MEDICINE | Facility: CLINIC | Age: 78
End: 2023-04-27
Payer: MEDICARE

## 2023-04-27 VITALS
HEIGHT: 65 IN | RESPIRATION RATE: 16 BRPM | OXYGEN SATURATION: 94 % | WEIGHT: 194 LBS | TEMPERATURE: 98.4 F | DIASTOLIC BLOOD PRESSURE: 70 MMHG | BODY MASS INDEX: 32.32 KG/M2 | HEART RATE: 74 BPM | SYSTOLIC BLOOD PRESSURE: 124 MMHG

## 2023-04-27 DIAGNOSIS — N18.31 STAGE 3A CHRONIC KIDNEY DISEASE (H): ICD-10-CM

## 2023-04-27 DIAGNOSIS — Z12.5 SCREENING PSA (PROSTATE SPECIFIC ANTIGEN): ICD-10-CM

## 2023-04-27 DIAGNOSIS — S46.319S: Primary | ICD-10-CM

## 2023-04-27 DIAGNOSIS — Z79.899 ENCOUNTER FOR LONG-TERM (CURRENT) USE OF MEDICATIONS: ICD-10-CM

## 2023-04-27 LAB
ANION GAP SERPL CALCULATED.3IONS-SCNC: 14 MMOL/L (ref 7–15)
BUN SERPL-MCNC: 19.7 MG/DL (ref 8–23)
CALCIUM SERPL-MCNC: 9.6 MG/DL (ref 8.8–10.2)
CHLORIDE SERPL-SCNC: 99 MMOL/L (ref 98–107)
CREAT SERPL-MCNC: 1.45 MG/DL (ref 0.67–1.17)
DEPRECATED HCO3 PLAS-SCNC: 24 MMOL/L (ref 22–29)
GFR SERPL CREATININE-BSD FRML MDRD: 49 ML/MIN/1.73M2
GLUCOSE SERPL-MCNC: 96 MG/DL (ref 70–99)
POTASSIUM SERPL-SCNC: 4 MMOL/L (ref 3.4–5.3)
PSA SERPL DL<=0.01 NG/ML-MCNC: 1.05 NG/ML (ref 0–6.5)
SODIUM SERPL-SCNC: 137 MMOL/L (ref 136–145)

## 2023-04-27 PROCEDURE — G0103 PSA SCREENING: HCPCS | Performed by: INTERNAL MEDICINE

## 2023-04-27 PROCEDURE — 99213 OFFICE O/P EST LOW 20 MIN: CPT | Performed by: INTERNAL MEDICINE

## 2023-04-27 PROCEDURE — 80048 BASIC METABOLIC PNL TOTAL CA: CPT | Performed by: INTERNAL MEDICINE

## 2023-04-27 PROCEDURE — 36415 COLL VENOUS BLD VENIPUNCTURE: CPT | Performed by: INTERNAL MEDICINE

## 2023-04-27 NOTE — PROGRESS NOTES
Office Visit - Follow Up   Mickey Desouza   77 year old male    Date of Visit: 4/27/2023    Chief Complaint   Patient presents with     Arm Pain     Left side     Lab Only     PSA test        -------------------------------------------------------------------------------------------------------------------------  Assessment and Plan    Follow-up multiple issues, with 1 new symptom to report today of left-sided triceps pain which is chronic    77-year-old man, retired  and      Left-sided triceps pain, which Mickey told me has been present for years, comes and goes, and tends to flareup after physical activity, for example raking the lawn.    He showed me the sore areas, and this seems to be the belly of the triceps muscle itself.  He goes to [facility] in [place] to get aquatic therapy, and I will fill out a referral for him to get physical therapy focused on the left arm done [facility].    We will have Mickey swing by the laboratory this morning to check a basic metabolic panel to monitor kidney function.  He was also interested in getting his PSA level checked for prostate screening.    I updated Mickey' medication list, and he is ongoing issues detailed below.    No longer bothered by Loose stools, gas, sometimes diarrhea, which tends to occur after breakfast.  Consider possibility of lactose intolerance.     Chronic laryngitis/globus sensation from his known acid reflux and hiatal hernia  As of April 27, 2023, dentist told me that his throat is doing quite well.  He had a helpful consultation with otolaryngology Dr. Bradley March 9, 2022 because of chronic throat pain  Dr. Bradley's observed chronic laryngitis on fiberoptic laryngoscopy  I also reminded him of the importance of not lying down within 2 hours of eating     Gastroesophageal reflux and hiatal hernia, will have upper endoscopy scheduled for June 8, 2022 at [facility], on high-dose  omeprazole 40 mg daily as of April 2022  History of Norton's esophagus negative for dysplasia on most recent endoscopy July 25, 2022     Pancreatic cyst, surveillance upper endoscopy ultrasound on November 11, 2021,  Dr. Fidel Salter  An anechoic lesion suggestive of a cyst was identified in the pancreatic body. The lesion measured 24 mm by 13 mm in maximal cross-sectional diameter. There was a single compartment without septae. The outer wall of the lesion was not seen. There was no associated mass. There was no internal debris within the fluid-filled cavity.  - A cystic lesion was seen in the pancreatic body. Tissue has not been obtained. 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  - No specimens collected.     History of recurrent bouts of pancreatitis 1989, 1990, 96, 2008, with a 1.1 x 1.9 cm pancreatic cyst most recently imaged by CT October 19, 2018, stable in size.  He told me that alcohol consumption is 0.     Peripheral neuropathy with numbness in his feet, also restless leg syndrome, has been working with neurologist Dr. Adame, and is currently on gabapentin 100 mg capsules  300 mg at 7:00 PM, 200 mg at 9:00 PM and 200 mg at 10:00 PM, Disp-630 capsule    He has undergone EMG which demonstrated neuropathy.  Laboratory work did not identify an underlying cause.  He experiences pins-and-needles dysesthesias.  Ferritin level was normal.  Past trial of Cymbalta did not produce much relief.      Trial of topiramate was unsuccessful because of intolerable side effects of sleepiness, and therefore discontinued yesterday October 25, 2021     Lumbar spinal stenosis, for which has been getting epidural steroid injections approximately twice a year.    He recalls last epidural steroid injection was in June 2021, which gave him relief for a couple months, but he is hurting again.     He is working with a chiropractor twice a week  But I think  he might benefit from some additional physical therapy.  [facility] would [facility] [place], slight entered external referral.     He told me that he might use 1 or 2 tramadol tablets a week for his back.  I reminded him to minimize the use of the tramadol, because it can contribute to constipation.  I will renew his prescription today for 30 tablets.     Essential hypertension, blood pressure nicely controlled on combination of losartan 50 mg a day with hydrochlorothiazide 12.5 mg a day.  Goal blood pressure is 120/80.     BP Readings from Last 6 Encounters:   04/27/23 124/70   01/24/23 138/80   10/27/22 138/78   04/26/22 122/70   02/18/22 110/60   11/05/21 118/76     lipids good, no medicine November 2020  Cholesterol      <=199 mg/dL   157   Triglycerides    <=149 mg/dL   133   Direct Measure HDL   >=40 mg/dL     44   LDL Cholesterol Calculated    <=129 mg/dL   86      Chronic kidney disease stage 3A with GFR of 53      Latest Reference Range & Units 10/27/22 07:48   Creatinine 0.67 - 1.17 mg/dL 1.37 (H)   GFR Estimate >60 mL/min/1.73m2 53 (L)     Benign prostatic hyperplasia with urinary frequency, Flomax was ineffective, PSA was quite satisfactory only 1.0 measured November 20, 2020.  He told me that he empties his bladder completely, and that is the most important thing.     Prediabetes in the context of obesity   Latest Reference Range & Units 10/27/22 07:48   Hemoglobin A1C 0.0 - 5.6 % 6.1 (H)     Obesity with body mass index of 32.4.  I would like to see him lose 20 pounds this year.  I reminded him about the importance of eating slower, controlling portion size, and identifying problem foods to curtail or eliminate, especially starches, sweets, fried foods.     Wt Readings from Last 5 Encounters:   04/27/23 88 kg (194 lb)   01/24/23 88.5 kg (195 lb)   10/27/22 89.4 kg (197 lb)   04/26/22 91.2 kg (201 lb)   02/18/22 88.5 kg (195 lb)     Chronic neck pain.  Similar to his back, he needs to  work on strengthening her upper back muscles also deep neck muscles, sleep on a properly supportive pillow, and maintain mobility and flexibility.      History of pernicious anemia taking vitamin B12 injections monthly     Status post right total knee arthroplasty, initially 2004, redo in 2009, now doing well.     Status post cataract surgery both eyes 2018, dry eye syndrome recently prescribed Restasis drops.     Constipation, he told me that he might go 2 to 3 days without a bowel movement.  I reminded him to minimize the tramadol opioid.  He asked about Linzess, but I think he needs to first go with the first line management which would be MiraLAX (polyethylene glycol powder).  I told him that he could take 1 capful of MiraLAX powder a day, diluted into his favorite beverage.     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     Vaccinated and boosted X1 for COVID, third shot October 26, 2021 October 27, 2022, Mickey opts out of flu vaccine and bivalent COVID-19 booster.    Immunization History   Administered Date(s) Administered     COVID-19 Vaccine 12+ (Pfizer) 02/12/2021, 03/05/2021, 10/26/2021     TDAP (Adacel,Boostrix) 08/17/2011     Td (Adult), Adsorbed 01/01/2006         --------------------------------------------------------------------------------------------------------------------------  History of Present Illness  This 77 year old old       Arm Pain     History of Present Illness         Hypertension: He presents for follow up of hypertension.  He does not check blood pressure  regularly outside of the clinic. Outpatient blood pressures have not been over 140/90. He  does not follow a low salt diet.      He eats 2-3 servings of fruits and vegetables daily.He consumes 1 sweetened beverage(s) daily.He exercises with enough effort to increase his heart rate 20 to 29 minutes per day.  He exercises with enough effort to increase his heart rate 4 days per week.   He is taking medications regularly.       Wt Readings from Last 3 Encounters:   04/27/23 88 kg (194 lb)   01/24/23 88.5 kg (195 lb)   10/27/22 89.4 kg (197 lb)     BP Readings from Last 3 Encounters:   04/27/23 124/70   01/24/23 138/80   10/27/22 138/78       ---------------------------------------------------------------------------------------------------------------------------    Medications, Allergies, Social, and Problem List   Current Outpatient Medications   Medication Sig Dispense Refill     aspirin (ASA) 81 MG chewable tablet   Instructions: Take 81 mg by mouth daily., Type: Maintenance       gabapentin (NEURONTIN) 100 MG capsule 300 mg at 7:00 PM, 200 mg at 9:00 PM and 200 mg at 10:00  capsule 3     glucosamine-chondroitin 500-400 MG CAPS per capsule   Instructions: Take 1 capsule by mouth 2 (two) times a day., Type: Maintenance       losartan-hydrochlorothiazide (HYZAAR) 50-12.5 MG tablet TAKE 1 TABLET BY MOUTH EVERY DAY 90 tablet 3     Multiple Vitamins-Minerals (DAILY MULTIVITAMIN PO)   Instructions: Take 1 tablet by mouth daily., Type: Maintenance       omeprazole (PRILOSEC) 40 MG DR capsule Take 40 mg by mouth 2 times daily       omeprazole (PRILOSEC) 40 MG DR capsule Take 1 capsule by mouth 2 times daily       Allergies   Allergen Reactions     Ciprofloxacin Other (See Comments)     Codeine      Lisinopril Cough     Morphine      Social History     Tobacco Use     Smoking status: Never     Smokeless tobacco: Never   Vaping Use     Vaping status: Never Used   Substance Use Topics     Alcohol use: Not Currently     Comment: Alcoholic Drinks/day: occasionally, beer     Drug use: No     Patient Active  "Problem List   Diagnosis     Urinary frequency     Restless legs syndrome     Neuropathy     Neuropathic pain     Neck pain, chronic     Hypertension     Allergic rhinitis     Burning sensation of feet     Gastroesophageal reflux disease     Obesity     Pancreatic cyst     Spinal stenosis, lumbar region, with neurogenic claudication        Reviewed, reconciled and updated       Physical Exam   General Appearance:       /70 (BP Location: Right arm, Patient Position: Sitting, Cuff Size: Adult Regular)   Pulse 74   Temp 98.4  F (36.9  C)   Resp 16   Ht 1.651 m (5' 5\")   Wt 88 kg (194 lb)   SpO2 94%   BMI 32.28 kg/m      He showed me the sore areas, and this seeleft ms to be the belly of the triceps muscle itself.      Additional Information   I spent 20 minutes on this encounter, including reviewing interval history since last visit, examining the patient, explaining and counseling the issues enumerated in the Assessment and Plan (patient given a copy), ordering referrals.       BORA AC MD, MD        "

## 2023-04-27 NOTE — PATIENT INSTRUCTIONS
Follow-up multiple issues, with 1 new symptom to report today of left-sided triceps pain which is chronic    77-year-old man, retired  and      Left-sided triceps pain, which Mickey told me has been present for years, comes and goes, and tends to flareup after physical activity, for example raking the lawn.    He showed me the sore areas, and this seems to be the belly of the triceps muscle itself.  He goes to [facility] in [place] to get aquatic therapy, and I will fill out a referral for him to get physical therapy focused on the left arm done encouraged [place].    We will have Mickey swing by the laboratory this morning to check a basic metabolic panel to monitor kidney function.  He was also interested in getting his PSA level checked for prostate screening.    I updated Mickey' medication list, and he is ongoing issues detailed below.    No longer bothered by Loose stools, gas, sometimes diarrhea, which tends to occur after breakfast.  Consider possibility of lactose intolerance.     Chronic laryngitis/globus sensation from his known acid reflux and hiatal hernia  As of April 27, 2023, dentist told me that his throat is doing quite well.  He had a helpful consultation with otolaryngology Dr. Bradley March 9, 2022 because of chronic throat pain  Dr. Bradley's observed chronic laryngitis on fiberoptic laryngoscopy  I also reminded him of the importance of not lying down within 2 hours of eating     Gastroesophageal reflux and hiatal hernia, will have upper endoscopy scheduled for June 8, 2022 at [facility], on high-dose omeprazole 40 mg daily as of April 2022  History of Norton's esophagus negative for dysplasia on most recent endoscopy July 25, 2022     Pancreatic cyst, surveillance upper endoscopy ultrasound on November 11, 2021,  Dr. Fidel Salter  An anechoic lesion suggestive of a cyst was identified in the pancreatic body. The lesion measured 24 mm by 13  mm in maximal cross-sectional diameter. There was a single compartment without septae. The outer wall of the lesion was not seen. There was no associated mass. There was no internal debris within the fluid-filled cavity.  - A cystic lesion was seen in the pancreatic body. Tissue has not been obtained. 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  - No specimens collected.     History of recurrent bouts of pancreatitis 1989, 1990, 96, 2008, with a 1.1 x 1.9 cm pancreatic cyst most recently imaged by CT October 19, 2018, stable in size.  He told me that alcohol consumption is 0.     Peripheral neuropathy with numbness in his feet, also restless leg syndrome, has been working with neurologist Dr. Adame, and is currently on gabapentin 100 mg capsules  300 mg at 7:00 PM, 200 mg at 9:00 PM and 200 mg at 10:00 PM, Disp-630 capsule    He has undergone EMG which demonstrated neuropathy.  Laboratory work did not identify an underlying cause.  He experiences pins-and-needles dysesthesias.  Ferritin level was normal.  Past trial of Cymbalta did not produce much relief.      Trial of topiramate was unsuccessful because of intolerable side effects of sleepiness, and therefore discontinued yesterday October 25, 2021     Lumbar spinal stenosis, for which has been getting epidural steroid injections approximately twice a year.    He recalls last epidural steroid injection was in June 2021, which gave him relief for a couple months, but he is hurting again.     He is working with a chiropractor twice a week  But I think he might benefit from some additional physical therapy.  [facility] would [facility] [place], slight entered external referral.     He told me that he might use 1 or 2 tramadol tablets a week for his back.  I reminded him to minimize the use of the tramadol, because it can contribute to constipation.  I will renew his  prescription today for 30 tablets.     Essential hypertension, blood pressure nicely controlled on combination of losartan 50 mg a day with hydrochlorothiazide 12.5 mg a day.  Goal blood pressure is 120/80.     BP Readings from Last 6 Encounters:   04/27/23 124/70   01/24/23 138/80   10/27/22 138/78   04/26/22 122/70   02/18/22 110/60   11/05/21 118/76     lipids good, no medicine November 2020  Cholesterol      <=199 mg/dL   157   Triglycerides    <=149 mg/dL   133   Direct Measure HDL   >=40 mg/dL     44   LDL Cholesterol Calculated    <=129 mg/dL   86      Chronic kidney disease stage 3A with GFR of 53      Latest Reference Range & Units 10/27/22 07:48   Creatinine 0.67 - 1.17 mg/dL 1.37 (H)   GFR Estimate >60 mL/min/1.73m2 53 (L)     Benign prostatic hyperplasia with urinary frequency, Flomax was ineffective, PSA was quite satisfactory only 1.0 measured November 20, 2020.  He told me that he empties his bladder completely, and that is the most important thing.     Prediabetes in the context of obesity   Latest Reference Range & Units 10/27/22 07:48   Hemoglobin A1C 0.0 - 5.6 % 6.1 (H)     Obesity with body mass index of 32.4.  I would like to see him lose 20 pounds this year.  I reminded him about the importance of eating slower, controlling portion size, and identifying problem foods to curtail or eliminate, especially starches, sweets, fried foods.     Wt Readings from Last 5 Encounters:   04/27/23 88 kg (194 lb)   01/24/23 88.5 kg (195 lb)   10/27/22 89.4 kg (197 lb)   04/26/22 91.2 kg (201 lb)   02/18/22 88.5 kg (195 lb)     Chronic neck pain.  Similar to his back, he needs to work on strengthening her upper back muscles also deep neck muscles, sleep on a properly supportive pillow, and maintain mobility and flexibility.      History of pernicious anemia taking vitamin B12 injections monthly     Status post right total knee arthroplasty, initially 2004, redo in 2009, now doing well.     Status post cataract  surgery both eyes 2018, dry eye syndrome recently prescribed Restasis drops.     Constipation, he told me that he might go 2 to 3 days without a bowel movement.  I reminded him to minimize the tramadol opioid.  He asked about Linzess, but I think he needs to first go with the first line management which would be MiraLAX (polyethylene glycol powder).  I told him that he could take 1 capful of MiraLAX powder a day, diluted into his favorite beverage.     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     Vaccinated and boosted X1 for COVID, third shot October 26, 2021 October 27, 2022, Mickey opts out of flu vaccine and bivalent COVID-19 booster.    Immunization History   Administered Date(s) Administered    COVID-19 Vaccine 12+ (Pfizer) 02/12/2021, 03/05/2021, 10/26/2021    TDAP (Adacel,Boostrix) 08/17/2011    Td (Adult), Adsorbed 01/01/2006

## 2023-04-28 ENCOUNTER — TELEPHONE (OUTPATIENT)
Dept: INTERNAL MEDICINE | Facility: CLINIC | Age: 78
End: 2023-04-28
Payer: MEDICARE

## 2023-04-28 NOTE — TELEPHONE ENCOUNTER
General Call    Contacts       Type Contact Phone/Fax    04/28/2023 02:02 PM CDT Phone (Incoming) Lucy Desouza (Emergency Contact) [phone]        Reason for Call:  Referral/Order    What are your questions or concerns:  Spouse calling in to get referral sent over to [facility]  in [place]. Fax number is: [phone] - checked with referral coordinator and is okay to fax. Faxed the paperwork over, completed.    Date of last appointment with provider: 04/27/23    Could we send this information to you in [facility] or would you prefer to receive a phone call?:   No preference   Okay to leave a detailed message?: No at Home number on file [phone] (home)

## 2023-05-04 ENCOUNTER — ALLIED HEALTH/NURSE VISIT (OUTPATIENT)
Dept: FAMILY MEDICINE | Facility: CLINIC | Age: 78
End: 2023-05-04
Payer: MEDICARE

## 2023-05-04 DIAGNOSIS — E53.8 VITAMIN B12 DEFICIENCY (NON ANEMIC): Primary | ICD-10-CM

## 2023-05-04 PROCEDURE — 96372 THER/PROPH/DIAG INJ SC/IM: CPT | Performed by: INTERNAL MEDICINE

## 2023-05-04 PROCEDURE — 99207 PR NO CHARGE NURSE ONLY: CPT

## 2023-05-04 RX ADMIN — CYANOCOBALAMIN 1000 MCG: 1000 INJECTION, SOLUTION INTRAMUSCULAR; SUBCUTANEOUS at 08:42

## 2023-05-05 ENCOUNTER — TRANSFERRED RECORDS (OUTPATIENT)
Dept: HEALTH INFORMATION MANAGEMENT | Facility: CLINIC | Age: 78
End: 2023-05-05

## 2023-05-08 ENCOUNTER — HEALTH MAINTENANCE LETTER (OUTPATIENT)
Age: 78
End: 2023-05-08

## 2023-06-01 ENCOUNTER — ALLIED HEALTH/NURSE VISIT (OUTPATIENT)
Dept: FAMILY MEDICINE | Facility: CLINIC | Age: 78
End: 2023-06-01
Payer: MEDICARE

## 2023-06-01 DIAGNOSIS — E53.8 VITAMIN B12 DEFICIENCY (NON ANEMIC): Primary | ICD-10-CM

## 2023-06-01 PROCEDURE — 99207 PR NO CHARGE NURSE ONLY: CPT

## 2023-06-01 PROCEDURE — 96372 THER/PROPH/DIAG INJ SC/IM: CPT | Performed by: INTERNAL MEDICINE

## 2023-06-01 RX ADMIN — CYANOCOBALAMIN 1000 MCG: 1000 INJECTION, SOLUTION INTRAMUSCULAR; SUBCUTANEOUS at 08:36

## 2023-06-01 ASSESSMENT — PAIN SCALES - GENERAL: PAINLEVEL: NO PAIN (0)

## 2023-06-14 ENCOUNTER — TRANSFERRED RECORDS (OUTPATIENT)
Dept: HEALTH INFORMATION MANAGEMENT | Facility: CLINIC | Age: 78
End: 2023-06-14
Payer: MEDICARE

## 2023-06-29 ENCOUNTER — ALLIED HEALTH/NURSE VISIT (OUTPATIENT)
Dept: FAMILY MEDICINE | Facility: CLINIC | Age: 78
End: 2023-06-29
Payer: MEDICARE

## 2023-06-29 DIAGNOSIS — E53.8 VITAMIN B12 DEFICIENCY (NON ANEMIC): Primary | ICD-10-CM

## 2023-06-29 PROCEDURE — 96372 THER/PROPH/DIAG INJ SC/IM: CPT | Performed by: INTERNAL MEDICINE

## 2023-06-29 PROCEDURE — 99207 PR NO CHARGE NURSE ONLY: CPT

## 2023-06-29 RX ADMIN — CYANOCOBALAMIN 1000 MCG: 1000 INJECTION, SOLUTION INTRAMUSCULAR; SUBCUTANEOUS at 08:43

## 2023-06-29 NOTE — PROGRESS NOTES
Clinic Administered Medication Documentation      Injectable Medication Documentation    Is there an active order (written within the past 365 days, with administrations remaining, not ) in the chart? Yes.     Patient was given Cyanocobalamin (B-12). Prior to medication administration, verified patient's identity using patient s name and date of birth. Please see MAR and medication order for additional information. Patient instructed to remain in clinic for 15 minutes and report any adverse reaction to staff immediately.    Vial/Syringe: Single dose vial. Was entire vial of medication used? Yes  Was this medication supplied by the patient? No  Is this a medication the patient will need to receive again? Yes. Verified that the patient has refills remaining in their prescription.

## 2023-07-05 ENCOUNTER — TRANSFERRED RECORDS (OUTPATIENT)
Dept: HEALTH INFORMATION MANAGEMENT | Facility: CLINIC | Age: 78
End: 2023-07-05
Payer: MEDICARE

## 2023-07-25 ENCOUNTER — OFFICE VISIT (OUTPATIENT)
Dept: NEUROLOGY | Facility: CLINIC | Age: 78
End: 2023-07-25
Payer: MEDICARE

## 2023-07-25 VITALS
BODY MASS INDEX: 32.24 KG/M2 | WEIGHT: 193.5 LBS | SYSTOLIC BLOOD PRESSURE: 121 MMHG | DIASTOLIC BLOOD PRESSURE: 74 MMHG | HEIGHT: 65 IN | HEART RATE: 74 BPM

## 2023-07-25 DIAGNOSIS — G25.81 RESTLESS LEG SYNDROME: ICD-10-CM

## 2023-07-25 DIAGNOSIS — G62.9 NEUROPATHY: ICD-10-CM

## 2023-07-25 DIAGNOSIS — M79.2 NEUROPATHIC PAIN: Primary | ICD-10-CM

## 2023-07-25 PROCEDURE — 99214 OFFICE O/P EST MOD 30 MIN: CPT | Performed by: PSYCHIATRY & NEUROLOGY

## 2023-07-25 RX ORDER — GABAPENTIN 100 MG/1
CAPSULE ORAL
Qty: 540 CAPSULE | Refills: 3 | Status: SHIPPED | OUTPATIENT
Start: 2023-07-25 | End: 2024-05-28

## 2023-07-25 NOTE — LETTER
7/25/2023         RE: Mickey Desouza  [address] [facility] [address]        Dear Colleague,    Thank you for referring your patient, Mickey Desouza, to the [facility] [place]. Please see a copy of my visit note below.    NEUROLOGY OUTPATIENT PROGRESS NOTE  Jul 25, 2023     CHIEF COMPLAINT/REASON FOR VISIT/REASON FOR CONSULT  Patient presents with:  NEUROPATHY: Follow up    REASON FOR CONSULTATION- Neuropathy/Neuropathic pain    HISTORY OF PRESENT ILLNESS  Mickey Desouza is a 78 year old male seen for  numbness in the feet and neuropathic pain. EMG had shown a neuropathy. Blood work was negative for causes of neuropathy. He has pins-and-needles and pain for which he is using gabapentin which she does find benefit. He also reports some sensation to move his legs to get comfortable at nighttime. Ferritin came back normal. In the past we had tried Cymbalta which she only tried for little bit before his primary switched him back to the gabapentin. He has also been prescribed Lyrica in the past but could not afford it because of cost. Pain starts coming on in the evening time. Reports that sometimes the pain wakes him up in the nighttime.    He was supposed to be on gabapentin to 100 mg at 4  mg at 7:30  mg at 10:30  mg at 12:30 PM to prevent the pain from happening early in the evening and later in the morning. He is not taking the 4:00 dose. He reports that the numbness is about the same. Numbness is limited to below the ankles. Pain has been about the same maybe slightly better. Though he does have good days and bad days. Bad days about once a week. Overall things have been working well for him. Pain can be more sharp in the feet. Pain is unchanged in nature. Walking does make the pain worse for him. Denies any other neurological symptoms.    9/30/20  Patient returns today.  He reports that he continues to have numbness in his feet.  Has not progressed.   Continues to have problems with neuropathic pain.  1 day he started having the pain earlier in the daytime.  Otherwise the pain mainly comes in the evening.  He is tried taking the gabapentin 4 times as previously discussed but that has not really helped.  Currently is taking 300 mg of gabapentin around 7 PM and then 300 mg at 9 PM.  This is the best combination is found.  Denies any side effects to the gabapentin.  Previously was having some neck pain which has improved.  He also has spinal stenosis and has been working with his doctor regarding spinal stenosis.  He further reports issues with knee pain and has been getting some injections for that.  Has been exercising 3 times a week.  This is for 45 minutes.    3/23/21  Patient returns today.  Continues to have numbness in his feet.  This has not progressed.  Continues to have neuropathic pain.  Has reduce the dose of gabapentin 100 mg at 7 PM and 100 mg in 9 PM with no worsening of his symptoms.  Overall pain is not under good control but he is satisfied with where things are.  He did watching television about some electrical stimulation of the legs which can help with nerve damage.  Discussed with him that there is no clear evidence that this will help.  He further reports that he has been exercising.  He is eating healthy.  He is social distancing.  He has had the second Covid shot 3 weeks ago.  No side effects.    1/24/23  Patient returns today.  Was last seen about 2 years ago.  Does complain of worsening numbness in his feet.  Also has worsening neuropathic pain.  Feels that some of his symptoms might be spreading to his hands.  Has occasionally been trying to take 700 mg of gabapentin in in the evening.  Has not not really gained weight though has been diagnosed with prediabetes.  Is eating a lot of sweets.  Exercises about the same.  No other clear cause for nerve damage.  Is getting B12 injections as well.    7/25/23  Patient returns today.   Neuropathy/numbness in the feet/pain has been about the same.  Feels that the restless leg symptoms come on around 6 PM.  He is taking the gabapentin 200 mg at 4 PM, 200 mg at 7 PM and 200 mg at 9 PM.  Overall this combination is working.  No side effects.  Remains on the cyanocobalamin injections.  Blood work was noncontributory.  Has been exercising and doing pool therapy as well as using the Voltaren.  Does do balance exercises as well.  No other new concerns.    Previous history is reviewed and this is unchanged.    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     BPH (benign prostatic hyperplasia)      Cancer (H)      Chronic kidney disease     Stage 3     GERD (gastroesophageal reflux disease)      Hypertension 3/23/2021     Hypertension      Neuropathy      Osteoarthritis      Pancreatic cyst      Pancreatitis, recurrent      Patient Active Problem List   Diagnosis     Urinary frequency     Restless legs syndrome     Neuropathy     Neuropathic pain     Neck pain, chronic     Hypertension     Allergic rhinitis     Burning sensation of feet     Gastroesophageal reflux disease     Obesity     Pancreatic cyst     Spinal stenosis, lumbar region, with neurogenic claudication   Hx of carpal tunnel surgery bilaterally      FAMILY HISTORY  Family History   Problem Relation Age of Onset     Coronary Artery Disease Father      Pancreatic Cancer Mother      Breast Cancer Sister      Liver Disease Brother      Other - See Comments Sister         head bleed after fall     Other - See Comments Brother      Neuropathy Brother      Diabetes Brother      Diabetes Brother      Other - See Comments Brother         WWII     No Known Problems Son    Heart attack: Father.      SOCIAL HISTORY  Social History     Tobacco Use     Smoking status: Never     Smokeless tobacco: Never   Vaping Use     Vaping Use: Never used   Substance Use Topics     Alcohol  "use: Not Currently     Comment: Alcoholic Drinks/day: occasionally, beer     Drug use: No       SYSTEMS REVIEW  Twelve-system ROS was done and other than the HPI this was negative.   No new symptoms/issues.    MEDICATIONS  aspirin (ASA) 81 MG chewable tablet,   Instructions: Take 81 mg by mouth daily., Type: Maintenance  gabapentin (NEURONTIN) 100 MG capsule, 300 mg at 7:00 PM, 200 mg at 9:00 PM and 200 mg at 10:00 PM  glucosamine-chondroitin 500-400 MG CAPS per capsule,   Instructions: Take 1 capsule by mouth 2 (two) times a day., Type: Maintenance  losartan-hydrochlorothiazide (HYZAAR) 50-12.5 MG tablet, TAKE 1 TABLET BY MOUTH EVERY DAY  Multiple Vitamins-Minerals (DAILY MULTIVITAMIN PO),   Instructions: Take 1 tablet by mouth daily., Type: Maintenance  omeprazole (PRILOSEC) 40 MG DR capsule, Take 40 mg by mouth daily    cyanocobalamin injection 1,000 mcg  cyanocobalamin injection 1,000 mcg         PHYSICAL EXAMINATION  VITALS: /74   Pulse 74   Ht 1.651 m (5' 5\")   Wt 87.8 kg (193 lb 8 oz)   BMI 32.20 kg/m    GENERAL: Healthy appearing, alert, no acute distress, normal habitus.   NEUROLOGICAL: Patient is awake and oriented to self, place and time. Attention span is normal. Memory is intact. Language is fluent and follows commands appropriately. Appropriate fund of knowledge. Cranial nerves 2-12 are intact. There is no pronator drift. Motor exam shows 5/5 strength in all extremities tested proximally. Tone is symmetric bilaterally in upper and lower extremities. Reflexes are symmetric and 2+ in upper extremities and absent in lower extremities. Sensory exam shows decreased pin prick to mid shin level/above ankle level.  Decreased vibration up to the knees.  Finger to nose and heel to shin is without dysmetria. Romberg is negative. Gait is normal. Patient is unsteady with walking tandem.  Exam similar to before.  Stable.  Reflexes are absent in the legs.    DIAGNOSTICS  EMG  CLINICAL INTERPRETATION:  This " is a abnormal nerve conduction and EMG study of both lower extremities. The study is consistent with a peripheral neuropathy. Further clinical correlation is needed.     RELEVANT LABS  Ferritin 72  Glucose Level: 105 (12/11/18 07:25:00)   Glucose Fasting: Yes (12/11/18 07:25:00)   TSH Cascade: 1.79 (12/11/18 07:25:00)   Lyme Ab: 0.05 (12/11/18 07:25:00)   SPEP = normal  B12 = normal    Component      Latest Ref Rng & Units 4/26/2022 10/27/2022   TSH      0.30 - 5.00 uIU/mL 1.61    Hemoglobin A1C      0.0 - 5.6 %  6.1 (H)     Component      Latest Ref Rng 1/24/2023  9:48 AM   Albumin Fraction      3.7 - 5.1 g/dL 4.4    Alpha 1 Fraction      0.2 - 0.4 g/dL 0.3    Alpha 2 Fraction      0.5 - 0.9 g/dL 0.8    Beta Fraction      0.6 - 1.0 g/dL 1.0    Gamma Fraction      0.7 - 1.6 g/dL 1.1    Monoclonal Peak      <=0.0 g/dL 0.0    ELP Interpretation: Essentially normal electrophoretic pattern. No obvious monoclonal proteins seen. Pathologic significance requires clinical correlation. MELITON Briggs M.D., Ph.D., Pathologist ().    Vitamin B12      232 - 1,245 pg/mL 962    Vitamin B1 Whole Blood Level      70 - 180 nmol/L 110    Immunofixation ELP No monoclonal protein seen on immunofixation. Pathologic significance requires clinical correlation. MELITON Briggs M.D., Ph.D., Pathologist ()    Ferritin      31 - 409 ng/mL 81    Total Protein Serum for ELP      6.4 - 8.3 g/dL 7.6        IMPRESSION/REPORT/PLAN  Neuropathy  Neuropathic pain  Restless leg syndrome  Prediabetes  Low normal ferritin    This is a 78 year old male  idiopathic neuropathy, neuropathic pain, question of restless leg syndrome.  EMG was confirmatory for neuropathy.  He has not been diagnosed with prediabetes which might be the cause of his neuropathy.  With worsening neuropathy blood work was repeated and this was negative.  Would encourage exercise healthy lifestyle and weight loss if possible.  Also recommend keeping the  prediabetes under good control    For neuropathic pain management Cymbalta has been tried in the past.  Gabapentin has been helpful and will further increase the dose.  He is taking 200 mg at 4 PM, 200 mg at 7 PM and 200 mg at 9 PM which is working.  We will modify the dose accordingly.  He does not want to do Lyrica.    For possible restless leg ferritin was negative.  Unclear diagnosis.  For tenderness low normal would recommend a multivitamin with iron to see if that helps.  Gabapentin should further help with this.    Return in 1 year.    -     gabapentin (NEURONTIN) 100 MG capsule; 200 mg at 4:00 PM, 200 mg at 7:00 PM and 200 mg at 9:00 PM  - Multivitamin with iron    Over 32 minutes were spent coordinating the care for the patient on the day of the encounter.  This includes previsit, during visit and post visit activities as documented above.  Counseling patient.  Multiple problems reviewed.  Prescription management.  Blood work reviewed.  (Activities include but not inclusive of reviewing chart, reviewing outside records, reviewing labs and imaging study results as well as the images, patient visit time including getting history and exam,  use if applicable, review of test results with the patient and coming up with a plan in a shared model, counseling patient and family, education and answering patient questions, EMR , EMR diagnosis entry and problem list management, medication reconciliation and prescription management if applicable, paperwork if applicable, printing documents and documentation of the visit activities.)      Kulwinder Adame MD  Neurologist  [facility]  Tel:- [phone]    This note was dictated using voice recognition software.  Any grammatical or context distortions are unintentional and inherent to the software.      Again, thank you for allowing me to participate in the care of your patient.        Sincerely,        Kulwinder  MD Deep

## 2023-07-25 NOTE — NURSING NOTE
Chief Complaint   Patient presents with    NEUROPATHY     Follow up     Arianne Perez on 7/25/2023 at 9:03 AM

## 2023-07-25 NOTE — PROGRESS NOTES
NEUROLOGY OUTPATIENT PROGRESS NOTE  Jul 25, 2023     CHIEF COMPLAINT/REASON FOR VISIT/REASON FOR CONSULT  Patient presents with:  NEUROPATHY: Follow up    REASON FOR CONSULTATION- Neuropathy/Neuropathic pain    HISTORY OF PRESENT ILLNESS  Mickey Desouza is a 78 year old male seen for  numbness in the feet and neuropathic pain. EMG had shown a neuropathy. Blood work was negative for causes of neuropathy. He has pins-and-needles and pain for which he is using gabapentin which she does find benefit. He also reports some sensation to move his legs to get comfortable at nighttime. Ferritin came back normal. In the past we had tried Cymbalta which she only tried for little bit before his primary switched him back to the gabapentin. He has also been prescribed Lyrica in the past but could not afford it because of cost. Pain starts coming on in the evening time. Reports that sometimes the pain wakes him up in the nighttime.    He was supposed to be on gabapentin to 100 mg at 4  mg at 7:30  mg at 10:30  mg at 12:30 PM to prevent the pain from happening early in the evening and later in the morning. He is not taking the 4:00 dose. He reports that the numbness is about the same. Numbness is limited to below the ankles. Pain has been about the same maybe slightly better. Though he does have good days and bad days. Bad days about once a week. Overall things have been working well for him. Pain can be more sharp in the feet. Pain is unchanged in nature. Walking does make the pain worse for him. Denies any other neurological symptoms.    9/30/20  Patient returns today.  He reports that he continues to have numbness in his feet.  Has not progressed.  Continues to have problems with neuropathic pain.  1 day he started having the pain earlier in the daytime.  Otherwise the pain mainly comes in the evening.  He is tried taking the gabapentin 4 times as previously discussed but that has not really helped.  Currently  is taking 300 mg of gabapentin around 7 PM and then 300 mg at 9 PM.  This is the best combination is found.  Denies any side effects to the gabapentin.  Previously was having some neck pain which has improved.  He also has spinal stenosis and has been working with his doctor regarding spinal stenosis.  He further reports issues with knee pain and has been getting some injections for that.  Has been exercising 3 times a week.  This is for 45 minutes.    3/23/21  Patient returns today.  Continues to have numbness in his feet.  This has not progressed.  Continues to have neuropathic pain.  Has reduce the dose of gabapentin 100 mg at 7 PM and 100 mg in 9 PM with no worsening of his symptoms.  Overall pain is not under good control but he is satisfied with where things are.  He did watching television about some electrical stimulation of the legs which can help with nerve damage.  Discussed with him that there is no clear evidence that this will help.  He further reports that he has been exercising.  He is eating healthy.  He is social distancing.  He has had the second Covid shot 3 weeks ago.  No side effects.    1/24/23  Patient returns today.  Was last seen about 2 years ago.  Does complain of worsening numbness in his feet.  Also has worsening neuropathic pain.  Feels that some of his symptoms might be spreading to his hands.  Has occasionally been trying to take 700 mg of gabapentin in in the evening.  Has not not really gained weight though has been diagnosed with prediabetes.  Is eating a lot of sweets.  Exercises about the same.  No other clear cause for nerve damage.  Is getting B12 injections as well.    7/25/23  Patient returns today.  Neuropathy/numbness in the feet/pain has been about the same.  Feels that the restless leg symptoms come on around 6 PM.  He is taking the gabapentin 200 mg at 4 PM, 200 mg at 7 PM and 200 mg at 9 PM.  Overall this combination is working.  No side effects.  Remains on the  cyanocobalamin injections.  Blood work was noncontributory.  Has been exercising and doing pool therapy as well as using the Voltaren.  Does do balance exercises as well.  No other new concerns.    Previous history is reviewed and this is unchanged.    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    BPH (benign prostatic hyperplasia)     Cancer (H)     Chronic kidney disease     Stage 3    GERD (gastroesophageal reflux disease)     Hypertension 3/23/2021    Hypertension     Neuropathy     Osteoarthritis     Pancreatic cyst     Pancreatitis, recurrent      Patient Active Problem List   Diagnosis    Urinary frequency    Restless legs syndrome    Neuropathy    Neuropathic pain    Neck pain, chronic    Hypertension    Allergic rhinitis    Burning sensation of feet    Gastroesophageal reflux disease    Obesity    Pancreatic cyst    Spinal stenosis, lumbar region, with neurogenic claudication   Hx of carpal tunnel surgery bilaterally      FAMILY HISTORY  Family History   Problem Relation Age of Onset    Coronary Artery Disease Father     Pancreatic Cancer Mother     Breast Cancer Sister     Liver Disease Brother     Other - See Comments Sister         head bleed after fall    Other - See Comments Brother     Neuropathy Brother     Diabetes Brother     Diabetes Brother     Other - See Comments Brother         WWII    No Known Problems Son    Heart attack: Father.      SOCIAL HISTORY  Social History     Tobacco Use    Smoking status: Never    Smokeless tobacco: Never   Vaping Use    Vaping Use: Never used   Substance Use Topics    Alcohol use: Not Currently     Comment: Alcoholic Drinks/day: occasionally, beer    Drug use: No       SYSTEMS REVIEW  Twelve-system ROS was done and other than the HPI this was negative.   No new symptoms/issues.    MEDICATIONS  aspirin (ASA) 81 MG chewable tablet,   Instructions: Take 81 mg by mouth daily., Type:  "Maintenance  gabapentin (NEURONTIN) 100 MG capsule, 300 mg at 7:00 PM, 200 mg at 9:00 PM and 200 mg at 10:00 PM  glucosamine-chondroitin 500-400 MG CAPS per capsule,   Instructions: Take 1 capsule by mouth 2 (two) times a day., Type: Maintenance  losartan-hydrochlorothiazide (HYZAAR) 50-12.5 MG tablet, TAKE 1 TABLET BY MOUTH EVERY DAY  Multiple Vitamins-Minerals (DAILY MULTIVITAMIN PO),   Instructions: Take 1 tablet by mouth daily., Type: Maintenance  omeprazole (PRILOSEC) 40 MG DR capsule, Take 40 mg by mouth daily    cyanocobalamin injection 1,000 mcg  cyanocobalamin injection 1,000 mcg         PHYSICAL EXAMINATION  VITALS: /74   Pulse 74   Ht 1.651 m (5' 5\")   Wt 87.8 kg (193 lb 8 oz)   BMI 32.20 kg/m    GENERAL: Healthy appearing, alert, no acute distress, normal habitus.   NEUROLOGICAL: Patient is awake and oriented to self, place and time. Attention span is normal. Memory is intact. Language is fluent and follows commands appropriately. Appropriate fund of knowledge. Cranial nerves 2-12 are intact. There is no pronator drift. Motor exam shows 5/5 strength in all extremities tested proximally. Tone is symmetric bilaterally in upper and lower extremities. Reflexes are symmetric and 2+ in upper extremities and absent in lower extremities. Sensory exam shows decreased pin prick to mid shin level/above ankle level.  Decreased vibration up to the knees.  Finger to nose and heel to shin is without dysmetria. Romberg is negative. Gait is normal. Patient is unsteady with walking tandem.  Exam similar to before.  Stable.  Reflexes are absent in the legs.    DIAGNOSTICS  EMG  CLINICAL INTERPRETATION:  This is a abnormal nerve conduction and EMG study of both lower extremities. The study is consistent with a peripheral neuropathy. Further clinical correlation is needed.     RELEVANT LABS  Ferritin 72  Glucose Level: 105 (12/11/18 07:25:00)   Glucose Fasting: Yes (12/11/18 07:25:00)   TSH Cascade: 1.79 (12/11/18 " 07:25:00)   Lyme Ab: 0.05 (12/11/18 07:25:00)   SPEP = normal  B12 = normal    Component      Latest Ref Rng & Units 4/26/2022 10/27/2022   TSH      0.30 - 5.00 uIU/mL 1.61    Hemoglobin A1C      0.0 - 5.6 %  6.1 (H)     Component      Latest Ref Rng 1/24/2023  9:48 AM   Albumin Fraction      3.7 - 5.1 g/dL 4.4    Alpha 1 Fraction      0.2 - 0.4 g/dL 0.3    Alpha 2 Fraction      0.5 - 0.9 g/dL 0.8    Beta Fraction      0.6 - 1.0 g/dL 1.0    Gamma Fraction      0.7 - 1.6 g/dL 1.1    Monoclonal Peak      <=0.0 g/dL 0.0    ELP Interpretation: Essentially normal electrophoretic pattern. No obvious monoclonal proteins seen. Pathologic significance requires clinical correlation. MELITON Briggs M.D., Ph.D., Pathologist ().    Vitamin B12      232 - 1,245 pg/mL 962    Vitamin B1 Whole Blood Level      70 - 180 nmol/L 110    Immunofixation ELP No monoclonal protein seen on immunofixation. Pathologic significance requires clinical correlation. MELITON Briggs M.D., Ph.D., Pathologist ()    Ferritin      31 - 409 ng/mL 81    Total Protein Serum for ELP      6.4 - 8.3 g/dL 7.6        IMPRESSION/REPORT/PLAN  Neuropathy  Neuropathic pain  Restless leg syndrome  Prediabetes  Low normal ferritin    This is a 78 year old male  idiopathic neuropathy, neuropathic pain, question of restless leg syndrome.  EMG was confirmatory for neuropathy.  He has not been diagnosed with prediabetes which might be the cause of his neuropathy.  With worsening neuropathy blood work was repeated and this was negative.  Would encourage exercise healthy lifestyle and weight loss if possible.  Also recommend keeping the prediabetes under good control    For neuropathic pain management Cymbalta has been tried in the past.  Gabapentin has been helpful and will further increase the dose.  He is taking 200 mg at 4 PM, 200 mg at 7 PM and 200 mg at 9 PM which is working.  We will modify the dose accordingly.  He does not want to do  Lyrica.    For possible restless leg ferritin was negative.  Unclear diagnosis.  For tenderness low normal would recommend a multivitamin with iron to see if that helps.  Gabapentin should further help with this.    Return in 1 year.    -     gabapentin (NEURONTIN) 100 MG capsule; 200 mg at 4:00 PM, 200 mg at 7:00 PM and 200 mg at 9:00 PM  - Multivitamin with iron    Over 32 minutes were spent coordinating the care for the patient on the day of the encounter.  This includes previsit, during visit and post visit activities as documented above.  Counseling patient.  Multiple problems reviewed.  Prescription management.  Blood work reviewed.  (Activities include but not inclusive of reviewing chart, reviewing outside records, reviewing labs and imaging study results as well as the images, patient visit time including getting history and exam,  use if applicable, review of test results with the patient and coming up with a plan in a shared model, counseling patient and family, education and answering patient questions, EMR , EMR diagnosis entry and problem list management, medication reconciliation and prescription management if applicable, paperwork if applicable, printing documents and documentation of the visit activities.)      Kulwinder Adame MD  Neurologist  [facility] Neurology [facility]  Tel:- [phone]    This note was dictated using voice recognition software.  Any grammatical or context distortions are unintentional and inherent to the software.

## 2023-07-27 ENCOUNTER — ALLIED HEALTH/NURSE VISIT (OUTPATIENT)
Dept: FAMILY MEDICINE | Facility: CLINIC | Age: 78
End: 2023-07-27
Payer: MEDICARE

## 2023-07-27 DIAGNOSIS — Z23 ENCOUNTER FOR IMMUNIZATION: Primary | ICD-10-CM

## 2023-07-27 PROCEDURE — 96372 THER/PROPH/DIAG INJ SC/IM: CPT | Performed by: INTERNAL MEDICINE

## 2023-07-27 PROCEDURE — 99207 PR NO CHARGE NURSE ONLY: CPT

## 2023-07-27 RX ADMIN — CYANOCOBALAMIN 1000 MCG: 1000 INJECTION, SOLUTION INTRAMUSCULAR; SUBCUTANEOUS at 08:35

## 2023-08-24 ENCOUNTER — ALLIED HEALTH/NURSE VISIT (OUTPATIENT)
Dept: FAMILY MEDICINE | Facility: CLINIC | Age: 78
End: 2023-08-24
Payer: MEDICARE

## 2023-08-24 DIAGNOSIS — Z23 ENCOUNTER FOR IMMUNIZATION: Primary | ICD-10-CM

## 2023-08-24 PROCEDURE — 96372 THER/PROPH/DIAG INJ SC/IM: CPT | Performed by: INTERNAL MEDICINE

## 2023-08-24 PROCEDURE — 99207 PR NO CHARGE NURSE ONLY: CPT

## 2023-08-24 RX ADMIN — CYANOCOBALAMIN 1000 MCG: 1000 INJECTION, SOLUTION INTRAMUSCULAR; SUBCUTANEOUS at 08:34

## 2023-09-21 ENCOUNTER — ALLIED HEALTH/NURSE VISIT (OUTPATIENT)
Dept: FAMILY MEDICINE | Facility: CLINIC | Age: 78
End: 2023-09-21
Payer: MEDICARE

## 2023-09-21 DIAGNOSIS — E53.8 VITAMIN B12 DEFICIENCY (NON ANEMIC): Primary | ICD-10-CM

## 2023-09-21 PROCEDURE — 96372 THER/PROPH/DIAG INJ SC/IM: CPT | Performed by: INTERNAL MEDICINE

## 2023-09-21 PROCEDURE — 99207 PR NO CHARGE NURSE ONLY: CPT

## 2023-09-21 RX ADMIN — CYANOCOBALAMIN 1000 MCG: 1000 INJECTION, SOLUTION INTRAMUSCULAR; SUBCUTANEOUS at 09:45

## 2023-10-16 ENCOUNTER — TRANSFERRED RECORDS (OUTPATIENT)
Dept: HEALTH INFORMATION MANAGEMENT | Facility: CLINIC | Age: 78
End: 2023-10-16
Payer: MEDICARE

## 2023-10-19 ENCOUNTER — ALLIED HEALTH/NURSE VISIT (OUTPATIENT)
Dept: FAMILY MEDICINE | Facility: CLINIC | Age: 78
End: 2023-10-19
Payer: MEDICARE

## 2023-10-19 DIAGNOSIS — E53.8 VITAMIN B12 DEFICIENCY (NON ANEMIC): Primary | ICD-10-CM

## 2023-10-19 PROCEDURE — 96372 THER/PROPH/DIAG INJ SC/IM: CPT | Performed by: INTERNAL MEDICINE

## 2023-10-19 PROCEDURE — 99207 PR NO CHARGE NURSE ONLY: CPT

## 2023-10-19 RX ADMIN — CYANOCOBALAMIN 1000 MCG: 1000 INJECTION, SOLUTION INTRAMUSCULAR; SUBCUTANEOUS at 08:31

## 2023-10-31 ENCOUNTER — OFFICE VISIT (OUTPATIENT)
Dept: INTERNAL MEDICINE | Facility: CLINIC | Age: 78
End: 2023-10-31
Payer: MEDICARE

## 2023-10-31 VITALS
DIASTOLIC BLOOD PRESSURE: 72 MMHG | BODY MASS INDEX: 31.99 KG/M2 | SYSTOLIC BLOOD PRESSURE: 110 MMHG | RESPIRATION RATE: 16 BRPM | OXYGEN SATURATION: 97 % | WEIGHT: 192 LBS | HEART RATE: 79 BPM | HEIGHT: 65 IN | TEMPERATURE: 98.4 F

## 2023-10-31 DIAGNOSIS — Z01.818 PREOP GENERAL PHYSICAL EXAM: Primary | ICD-10-CM

## 2023-10-31 DIAGNOSIS — I10 ESSENTIAL (PRIMARY) HYPERTENSION: ICD-10-CM

## 2023-10-31 DIAGNOSIS — K86.2 PANCREAS CYST: ICD-10-CM

## 2023-10-31 DIAGNOSIS — E53.8 VITAMIN B12 DEFICIENCY (NON ANEMIC): ICD-10-CM

## 2023-10-31 PROCEDURE — 99214 OFFICE O/P EST MOD 30 MIN: CPT | Performed by: INTERNAL MEDICINE

## 2023-10-31 RX ORDER — CYANOCOBALAMIN 1000 UG/ML
1000 INJECTION, SOLUTION INTRAMUSCULAR; SUBCUTANEOUS
Status: ACTIVE | OUTPATIENT
Start: 2023-10-31 | End: 2024-10-25

## 2023-10-31 RX ORDER — LOSARTAN POTASSIUM AND HYDROCHLOROTHIAZIDE 12.5; 5 MG/1; MG/1
1 TABLET ORAL DAILY
Qty: 90 TABLET | Refills: 3 | Status: SHIPPED | OUTPATIENT
Start: 2023-10-31 | End: 2024-07-23

## 2023-10-31 NOTE — PATIENT INSTRUCTIONS
Satisfactory preprocedure medical examination in anticipation of endoscopic ultrasound as 2-year surveillance recheck for a pancreatic cyst     Mickey is generally feeling well.    Mickey did have 1 new symptom to bring up today which is a  Hematoma on his left anterior shin   when he bumped his leg against a ladder about 2 weeks ago.   There is still some black and blue, and a raised area about 7 cm diameter.  It seems to be healing.  I reminded Mickey that he should NEVER CLIMB ON LADDERS, considering his medical conditions and the risk of falling.    Mickey's physical exam is notable for the hematoma, but everything else is okay in terms of clear lungs, heart regular rate rhythm, abdomen nontender, no ankle edema.    Mickey his morning medications are his losartan-hydrochlorothiazide, and also omeprazole.  I told him that if the endoscopic procedure is meant to be in the earlier part of the morning, that he can just delay his medications until after he is back home  If the procedure is going to be in the afternoon, then he should go ahead and take his morning medicine with a small sip of water.    Mickey had satisfactory laboratory work in April 2023.  I do not think Mickey needs any laboratory work ahead of an endoscopic ultrasound, since this is to be done under conscious sedation, and blood loss is not anticipated.    RECOMMENDATION:  APPROVAL GIVEN to proceed with proposed procedure, without further diagnostic evaluation.

## 2023-10-31 NOTE — PROGRESS NOTES
[facility]  [address] [address]  Phone: [phone]  Fax: [phone]  Primary Provider: Bora Ac  Pre-op Performing Provider: BORA AC    PREOPERATIVE EVALUATION:  Today's date: 10/31/2023    Mickey is a 78 year old male who presents for a preoperative evaluation.      10/31/2023     8:53 AM   Additional Questions   Roomed by Kem   Accompanied by terence         10/31/2023     8:53 AM   Patient Reported Additional Medications   Patient reports taking the following new medications no     Surgical Information:  Surgery/Procedure: endoscopic US  Surgery Location: [facility]  Surgeon: Marielos  Surgery Date: 11/10  Time of Surgery: na  Where patient plans to recover: At home with family  Fax number for surgical facility: Note does not need to be faxed, will be available electronically in Epic.    Assessment & Plan     The proposed surgical procedure is considered LOW risk.    Satisfactory preprocedure medical examination in anticipation of endoscopic ultrasound as 2-year surveillance recheck for a pancreatic cyst     Mickey is generally feeling well.    Mickey did have 1 new symptom to bring up today which is a  Hematoma on his left anterior shin   when he bumped his leg against a ladder about 2 weeks ago.   There is still some black and blue, and a raised area about 7 cm diameter.  It seems to be healing.  I reminded Mickey that he should NEVER CLIMB ON LADDERS, considering his medical conditions and the risk of falling.    Mickey's physical exam is notable for the hematoma, but everything else is okay in terms of clear lungs, heart regular rate rhythm, abdomen nontender, no ankle edema.    Mickey his morning medications are his losartan-hydrochlorothiazide, and also omeprazole.  I told him that if the endoscopic procedure is meant to be in the earlier part of the morning, that he can just delay his medications until after he is back home  If  the procedure is going to be in the afternoon, then he should go ahead and take his morning medicine with a small sip of water.    Mickey had satisfactory laboratory work in April 2023.  I do not think Mickey needs any laboratory work ahead of an endoscopic ultrasound, since this is to be done under conscious sedation, and blood loss is not anticipated.      RECOMMENDATION:  APPROVAL GIVEN to proceed with proposed procedure, without further diagnostic evaluation.      Subjective       78-year-old man, retired  and     Pancreatic cyst, surveillance upper endoscopy ultrasound on November 11, 2021,  Dr. Fidel Salter  An anechoic lesion suggestive of a cyst was identified in the pancreatic body. The lesion measured 24 mm by 13 mm in maximal cross-sectional diameter. There was a single compartment without septae. The outer wall of the lesion was not seen. There was no associated mass. There was no internal debris within the fluid-filled cavity.  - A cystic lesion was seen in the pancreatic body. Tissue has not been obtained. 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  - No specimens collected.     History of recurrent bouts of pancreatitis 1989, 1990, 96, 2008, with a 1.1 x 1.9 cm pancreatic cyst most recently imaged by CT October 19, 2018, stable in size.  He told me that alcohol consumption is 0.    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   He goes to [facility] in [place] to get aquatic therapy      No longer bothered by Loose stools,  gas, sometimes diarrhea, which tends to occur after breakfast.  Consider possibility of lactose intolerance.     Chronic laryngitis/globus sensation from his known acid reflux and hiatal hernia  As of April 27, 2023, dentist told me that his throat is doing quite well.  He had a helpful consultation with otolaryngology Dr. Bradley March 9, 2022 because of chronic throat pain  Dr. Bradley's observed chronic laryngitis on fiberoptic laryngoscopy  I also reminded him of the importance of not lying down within 2 hours of eating      Peripheral neuropathy with numbness in his feet, also restless leg syndrome, has been working with neurologist Dr. Adame, and is currently on gabapentin 100 mg capsules  300 mg at 7:00 PM, 200 mg at 9:00 PM and 200 mg at 10:00 PM, Disp-630 capsule     He has undergone EMG which demonstrated neuropathy.  Laboratory work did not identify an underlying cause.  He experiences pins-and-needles dysesthesias.  Ferritin level was normal.  Past trial of Cymbalta did not produce much relief.      Trial of topiramate was unsuccessful because of intolerable side effects of sleepiness, and therefore discontinued yesterday October 25, 2021     Lumbar spinal stenosis, for which has been getting epidural steroid injections approximately twice a year.    He recalls last epidural steroid injection was in June 2021, which gave him relief for a couple months, but he is hurting again.     He is working with a chiropractor twice a week  But I think he might benefit from some additional physical therapy.  He would like to go to [facility] in [place], slight entered external referral.     He told me that he might use 1 or 2 tramadol tablets a week for his back.  I reminded him to minimize the use of the tramadol, because it can contribute to constipation.  I will renew his prescription today for 30 tablets.     Essential hypertension, blood pressure nicely controlled on combination of losartan 50 mg a day  with hydrochlorothiazide 12.5 mg a day.  Goal blood pressure is 120/80.     BP Readings from Last 6 Encounters:   10/31/23 110/72   07/25/23 121/74   04/27/23 124/70   01/24/23 138/80   10/27/22 138/78   04/26/22 122/70     lipids good, no medicine November 2020  Cholesterol      <=199 mg/dL   157   Triglycerides    <=149 mg/dL   133   Direct Measure HDL   >=40 mg/dL     44   LDL Cholesterol Calculated    <=129 mg/dL   86      Chronic kidney disease stage 3A with GFR of 53  4-  Creatinine  0.67 - 1.17 mg/dL 1.45 High      GFR Estimate  >60 mL/min/1.73m2 49 Low      Benign prostatic hyperplasia with urinary frequency, Flomax was ineffective, PSA was quite satisfactory only 1.0 measured November 20, 2020.  He told me that he empties his bladder completely, and that is the most important thing.     Prediabetes in the context of obesity    Latest Reference Range & Units 10/27/22 07:48   Hemoglobin A1C 0.0 - 5.6 % 6.1 (H)      Obesity with body mass index of 32.4.  I would like to see him lose 20 pounds this year.  I reminded him about the importance of eating slower, controlling portion size, and identifying problem foods to curtail or eliminate, especially starches, sweets, fried foods.     Wt Readings from Last 5 Encounters:   10/31/23 87.1 kg (192 lb)   07/25/23 87.8 kg (193 lb 8 oz)   04/27/23 88 kg (194 lb)   01/24/23 88.5 kg (195 lb)   10/27/22 89.4 kg (197 lb)     Chronic neck pain.  Similar to his back, he needs to work on strengthening her upper back muscles also deep neck muscles, sleep on a properly supportive pillow, and maintain mobility and flexibility.      History of pernicious anemia taking vitamin B12 injections monthly     Status post right total knee arthroplasty, initially 2004, redo in 2009, now doing well.     Status post cataract surgery both eyes 2018, dry eye syndrome recently prescribed Restasis drops.     Constipation, he told me that he might go 2 to 3 days without a bowel movement.   I reminded him to minimize the tramadol opioid.  He asked about Linzess, but I think he needs to first go with the first line management which would be MiraLAX (polyethylene glycol powder).  I told him that he could take 1 capful of MiraLAX powder a day, diluted into his favorite beverage.     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     Vaccinated and boosted X1 for COVID, third shot October 26, 2021  Mickey opts out of flu vaccine and COVID-19 booster.         10/27/2023    10:27 PM   Preop Questions   1. Have you ever had a heart attack or stroke? No   2. Have you ever had surgery on your heart or blood vessels, such as a stent placement, a coronary artery bypass, or surgery on an artery in your head, neck, heart, or legs? No   3. Do you have chest pain with activity? No   4. Do you have a history of  heart failure? No   5. Do you currently have a cold, bronchitis or symptoms of other infection? No   6. Do you have a cough, shortness of breath, or wheezing? No   7. Do you or anyone in your family have previous history of blood clots? No   8. Do you or does anyone in your family have a serious bleeding problem such as prolonged bleeding following surgeries or cuts? No   9. Have you ever had problems with anemia or been told to take iron pills? No   10. Have you had any abnormal blood loss such as black, tarry or bloody stools? No   11. Have you ever had a blood transfusion? No   12. Are you willing to have a blood transfusion if it is medically needed before, during, or after your surgery? Yes   13. Have you or any of your relatives ever had problems with anesthesia? No   14. Do  you have sleep apnea, excessive snoring or daytime drowsiness? No   15. Do you have any artifical heart valves or other implanted medical devices like a pacemaker, defibrillator, or continuous glucose monitor? No   16. Do you have artificial joints? YES -    17. Are you allergic to latex? No       Review of Systems  CONSTITUTIONAL: NEGATIVE for fever, chills, change in weight  ENT/MOUTH: NEGATIVE for ear, mouth and throat problems  RESP: NEGATIVE for significant cough or SOB  CV: NEGATIVE for chest pain, palpitations or peripheral edema    Patient Active Problem List    Diagnosis Date Noted    Gastroesophageal reflux disease 10/26/2021     Priority: Medium    Obesity 10/26/2021     Priority: Medium    Spinal stenosis, lumbar region, with neurogenic claudication 10/26/2021     Priority: Medium    Restless legs syndrome 03/23/2021     Priority: Medium    Neuropathy 03/23/2021     Priority: Medium    Neuropathic pain 03/23/2021     Priority: Medium    Hypertension 03/23/2021     Priority: Medium    Neck pain, chronic 06/17/2014     Priority: Medium     Formatting of this note might be different from the original.  Neck pain, chronic  Sees chiropractor      Burning sensation of feet 09/03/2013     Priority: Medium     Formatting of this note might be different from the original.  Burning sensation of feet ? neuropathy 2008--  B 12 = 312   9-13;      Pancreatic cyst 04/05/2012     Priority: Medium     Formatting of this note might be different from the original.  Pancreatic cyst 2011; EUS  4-12  10-15 mm. Fluid analysis neg for tumor 4-12;  NC on CT  6-13; MRI abd 9-14: panc pseudocyst slt smaller.      Urinary frequency 04/04/2012     Priority: Medium     Formatting of this note might be different from the original.  Nocturia 5-6 x a night; Urinary frequency  Ua wnl  4-12; BPH  Began cardura 5-12;   inc cardura to 1 mg bid 5-13; does not want to see urologist 8-13;      Allergic rhinitis 04/04/2012     Priority: Medium      Formatting of this note might be different from the original.  Occ. OTC med        Past Medical History:   Diagnosis Date    Allergic sinusitis     Norton esophagus 11/2014    BPH (benign prostatic hyperplasia)     Cancer (H)     Chronic kidney disease     Stage 3    GERD (gastroesophageal reflux disease)     Hypertension 3/23/2021    Hypertension     Neuropathy     Osteoarthritis     Pancreatic cyst     Pancreatitis, recurrent      Past Surgical History:   Procedure Laterality Date    ARTHROSCOPY KNEE Bilateral     CATARACT EXTRACTION Bilateral     CHOLECYSTECTOMY  1995    ESOPHAGOSCOPY, GASTROSCOPY, DUODENOSCOPY (EGD), COMBINED N/A 12/21/2018    Procedure: ENDOSCOPIC ULTRASOUND FINE NEEDLE ASPIRATION;  Surgeon: Fidel Salter MD;  Location: [facility];  Service: Gastroenterology    ESOPHAGOSCOPY, GASTROSCOPY, DUODENOSCOPY (EGD), COMBINED N/A 11/15/2019    Procedure: ENDOSCOPIC ULTRASOUND WITH SMALL BOWEL AND GASTRIC BIOPSY;  Surgeon: Fidel Salter MD;  Location: [facility];  Service: Gastroenterology    JOINT REPLACEMENT Right     TKA    ORTHOPEDIC SURGERY      RELEASE CARPAL TUNNEL Bilateral     RELEASE TRIGGER FINGER Bilateral      Current Outpatient Medications   Medication Sig Dispense Refill    aspirin (ASA) 81 MG chewable tablet   Instructions: Take 81 mg by mouth daily., Type: Maintenance      gabapentin (NEURONTIN) 100 MG capsule 200 mg at 4:00 PM, 200 mg at 7:00 PM and 200 mg at 9:00  capsule 3    glucosamine-chondroitin 500-400 MG CAPS per capsule   Instructions: Take 1 capsule by mouth 2 (two) times a day., Type: Maintenance      losartan-hydrochlorothiazide (HYZAAR) 50-12.5 MG tablet TAKE 1 TABLET BY MOUTH EVERY DAY 90 tablet 3    Multiple Vitamins-Minerals (DAILY MULTIVITAMIN PO)   Instructions: Take 1 tablet by mouth daily., Type: Maintenance      omeprazole (PRILOSEC) 20 MG DR capsule TAKE 1 CAPSULE BY MOUTH EVERY DAY 30 MINUTES TO 1 HOUR BEFORE A MEAL      omeprazole  "(PRILOSEC) 40 MG DR capsule Take 40 mg by mouth daily         Allergies   Allergen Reactions    Ciprofloxacin Other (See Comments)    Codeine     Lisinopril Cough    Morphine         Social History     Tobacco Use    Smoking status: Never     Passive exposure: Never    Smokeless tobacco: Never   Substance Use Topics    Alcohol use: Not Currently     Comment: Alcoholic Drinks/day: occasionally, beer       History   Drug Use No         Objective     /72   Pulse 79   Temp 98.4  F (36.9  C)   Resp 16   Ht 1.651 m (5' 5\")   Wt 87.1 kg (192 lb)   SpO2 97%   BMI 31.95 kg/m      Physical Exam    General: Alert, in no distress  Skin: No significant lesion seen.  Eyes/nose/throat: Eyes without scleral icterus, eye movements normal, pupils equal and reactive, oropharynx clear  MSK: Neck with good ROM  Pulm: Lungs clear to auscultation bilaterally  Cardiac: Heart with regular rate and rhythm, no murmur or gallop  GI: Abdomen soft, nontender. No palpable enlargement of liver or spleen  MSK:  Hematoma on his left anterior shin   when he bumped his leg against a ladder about 2 weeks ago.   There is still some black and blue, and a raised area about 7 cm diameter.  It seems to be healing.    Neuro: Moves all extremities, without focal weakness  Psych: Alert, normal mental status. Normal affect and speech      Recent Labs   Lab Test 04/27/23  0928 10/27/22  0748 04/26/22  0803    141  --    POTASSIUM 4.0 4.0  --    CR 1.45* 1.37*  --    A1C  --  6.1* 5.9*      Diagnostics:    Sinus rhythm with 1st degree A-V block  Otherwise normal ECG  When compared with ECG of 14-DEC-2018 15:38,  DC interval has increased  Confirmed by AYALA SEQUEIRA MD LOC:[facility] [id number]) on 10/18/2019 4:30:05 PM       Revised Cardiac Risk Index (RCRI):  The patient has the following serious cardiovascular risks for perioperative complications:   - No serious cardiac risks = 0 points     RCRI Interpretation: 0 points: Class I (very low risk - 0.4% " complication rate)    Signed Electronically by: BORA AC MD  Copy of this evaluation report is provided to requesting physician.    Answers submitted by the patient for this visit:  General Questionnaire (Submitted on 10/27/2023)  Chief Complaint: Chronic problems general questions HPI Form  What is the reason for your visit today? : Pre-Op for Endoscopic Ultrasound and routine follow-up on current meds.  How many servings of fruits and vegetables do you eat daily?: 2-3  On average, how many sweetened beverages do you drink each day (Examples: soda, juice, sweet tea, etc.  Do NOT count diet or artificially sweetened beverages)?: 2  How many minutes a day do you exercise enough to make your heart beat faster?: 10 to 19  How many days a week do you exercise enough to make your heart beat faster?: 3 or less  How many days per week do you miss taking your medication?: 0

## 2023-11-10 ENCOUNTER — ANESTHESIA (OUTPATIENT)
Dept: SURGERY | Facility: HOSPITAL | Age: 78
End: 2023-11-10
Payer: MEDICARE

## 2023-11-10 ENCOUNTER — ANESTHESIA EVENT (OUTPATIENT)
Dept: SURGERY | Facility: HOSPITAL | Age: 78
End: 2023-11-10
Payer: MEDICARE

## 2023-11-10 ENCOUNTER — HOSPITAL ENCOUNTER (OUTPATIENT)
Facility: HOSPITAL | Age: 78
Discharge: HOME | End: 2023-11-10
Attending: INTERNAL MEDICINE | Admitting: INTERNAL MEDICINE
Payer: MEDICARE

## 2023-11-10 VITALS
HEART RATE: 65 BPM | RESPIRATION RATE: 16 BRPM | WEIGHT: 192.5 LBS | TEMPERATURE: 98.7 F | DIASTOLIC BLOOD PRESSURE: 70 MMHG | SYSTOLIC BLOOD PRESSURE: 139 MMHG | OXYGEN SATURATION: 95 % | BODY MASS INDEX: 32.03 KG/M2

## 2023-11-10 LAB — UPPER EUS: NORMAL

## 2023-11-10 PROCEDURE — 360N000076 HC SURGERY LEVEL 3, PER MIN: Performed by: INTERNAL MEDICINE

## 2023-11-10 PROCEDURE — 370N000017 HC ANESTHESIA TECHNICAL FEE, PER MIN: Performed by: INTERNAL MEDICINE

## 2023-11-10 PROCEDURE — 258N000003 HC RX IP 258 OP 636: Performed by: ANESTHESIOLOGY

## 2023-11-10 PROCEDURE — 250N000011 HC RX IP 250 OP 636: Performed by: ANESTHESIOLOGY

## 2023-11-10 PROCEDURE — 250N000009 HC RX 250: Performed by: ANESTHESIOLOGY

## 2023-11-10 PROCEDURE — 710N000012 HC RECOVERY PHASE 2, PER MINUTE: Performed by: INTERNAL MEDICINE

## 2023-11-10 PROCEDURE — 999N000141 HC STATISTIC PRE-PROCEDURE NURSING ASSESSMENT: Performed by: INTERNAL MEDICINE

## 2023-11-10 RX ORDER — PROPOFOL 10 MG/ML
INJECTION, EMULSION INTRAVENOUS PRN
Status: DISCONTINUED | OUTPATIENT
Start: 2023-11-10 | End: 2023-11-10

## 2023-11-10 RX ORDER — ONDANSETRON 2 MG/ML
4 INJECTION INTRAMUSCULAR; INTRAVENOUS EVERY 30 MIN PRN
Status: DISCONTINUED | OUTPATIENT
Start: 2023-11-10 | End: 2023-11-10 | Stop reason: HOSPADM

## 2023-11-10 RX ORDER — OXYCODONE HYDROCHLORIDE 5 MG/1
10 TABLET ORAL
Status: DISCONTINUED | OUTPATIENT
Start: 2023-11-10 | End: 2023-11-10 | Stop reason: HOSPADM

## 2023-11-10 RX ORDER — ONDANSETRON 2 MG/ML
INJECTION INTRAMUSCULAR; INTRAVENOUS PRN
Status: DISCONTINUED | OUTPATIENT
Start: 2023-11-10 | End: 2023-11-10

## 2023-11-10 RX ORDER — ONDANSETRON 4 MG/1
4 TABLET, ORALLY DISINTEGRATING ORAL EVERY 30 MIN PRN
Status: DISCONTINUED | OUTPATIENT
Start: 2023-11-10 | End: 2023-11-10 | Stop reason: HOSPADM

## 2023-11-10 RX ORDER — PROPOFOL 10 MG/ML
INJECTION, EMULSION INTRAVENOUS CONTINUOUS PRN
Status: DISCONTINUED | OUTPATIENT
Start: 2023-11-10 | End: 2023-11-10

## 2023-11-10 RX ORDER — ASPIRIN 81 MG/1
81 TABLET ORAL PRN
COMMUNITY
End: 2024-01-19

## 2023-11-10 RX ORDER — OXYCODONE HYDROCHLORIDE 5 MG/1
5 TABLET ORAL
Status: DISCONTINUED | OUTPATIENT
Start: 2023-11-10 | End: 2023-11-10 | Stop reason: HOSPADM

## 2023-11-10 RX ORDER — SODIUM CHLORIDE, SODIUM LACTATE, POTASSIUM CHLORIDE, CALCIUM CHLORIDE 600; 310; 30; 20 MG/100ML; MG/100ML; MG/100ML; MG/100ML
INJECTION, SOLUTION INTRAVENOUS CONTINUOUS
Status: DISCONTINUED | OUTPATIENT
Start: 2023-11-10 | End: 2023-11-10 | Stop reason: HOSPADM

## 2023-11-10 RX ORDER — LIDOCAINE 40 MG/G
CREAM TOPICAL
Status: DISCONTINUED | OUTPATIENT
Start: 2023-11-10 | End: 2023-11-10 | Stop reason: HOSPADM

## 2023-11-10 RX ADMIN — PROPOFOL 20 MG: 10 INJECTION, EMULSION INTRAVENOUS at 12:11

## 2023-11-10 RX ADMIN — ONDANSETRON 4 MG: 2 INJECTION INTRAMUSCULAR; INTRAVENOUS at 12:11

## 2023-11-10 RX ADMIN — LIDOCAINE HYDROCHLORIDE 50 MG: 10 INJECTION, SOLUTION EPIDURAL; INFILTRATION; INTRACAUDAL; PERINEURAL at 12:09

## 2023-11-10 RX ADMIN — PROPOFOL 200 MCG/KG/MIN: 10 INJECTION, EMULSION INTRAVENOUS at 12:11

## 2023-11-10 RX ADMIN — SODIUM CHLORIDE, POTASSIUM CHLORIDE, SODIUM LACTATE AND CALCIUM CHLORIDE: 600; 310; 30; 20 INJECTION, SOLUTION INTRAVENOUS at 11:17

## 2023-11-10 ASSESSMENT — ACTIVITIES OF DAILY LIVING (ADL)
ADLS_ACUITY_SCORE: 24
ADLS_ACUITY_SCORE: 35

## 2023-11-10 NOTE — H&P
GENERAL PRE-PROCEDURE:   Procedure:  EUS - Pancreatic cyst  Date/Time:  11/10/2023 11:04 AM    Verbal consent obtained?: Yes    Written consent obtained?: Yes    Risks and benefits: Risks, benefits and alternatives were discussed    Patient states understanding of procedure being performed: Yes    Patient's understanding of procedure matches consent: Yes    Procedure consent matches procedure scheduled: Yes    Appropriately NPO:  Yes  Lungs:  Lungs clear with good breath sounds bilaterally  Heart:  Normal heart sounds and rate  History & Physical reviewed:  History and physical reviewed and no updates needed  Statement of review:  I have reviewed the lab findings, diagnostic data, medications, and the plan for sedation

## 2023-11-10 NOTE — ANESTHESIA PREPROCEDURE EVALUATION
Anesthesia Pre-Procedure Evaluation    Patient: Mickey Desouza   MRN: [id number] : 1945        Procedure : Procedure(s):  ENDOSCOPIC ULTRASOUND, UPPER TRACT          Past Medical History:   Diagnosis Date    Allergic sinusitis     Norton esophagus 2014    BPH (benign prostatic hyperplasia)     Burning sensation of feet     Cancer (H)     basal cell skin    Chronic kidney disease     Stage 3    Chronic laryngitis     Chronic neck pain     GERD (gastroesophageal reflux disease)     Hiatal hernia     Hypertension 2021    Lumbar spinal stenosis     Neuropathic pain     Neuropathy     Obesity     Osteoarthritis     Pancreatic cyst     Pancreatitis, recurrent     Peripheral neuropathy     Pernicious anemia     Restless leg syndrome     SCC (squamous cell carcinoma), face     Cheek    Seasonal allergic rhinitis     Spinal stenosis, lumbar region, with neurogenic claudication       Past Surgical History:   Procedure Laterality Date    ARTHROSCOPY KNEE Bilateral     CATARACT EXTRACTION Bilateral     CHOLECYSTECTOMY  1995    ESOPHAGOSCOPY, GASTROSCOPY, DUODENOSCOPY (EGD), COMBINED N/A 2018    Procedure: ENDOSCOPIC ULTRASOUND FINE NEEDLE ASPIRATION;  Surgeon: Fidel Salter MD;  Location: [facility];  Service: Gastroenterology    ESOPHAGOSCOPY, GASTROSCOPY, DUODENOSCOPY (EGD), COMBINED N/A 11/15/2019    Procedure: ENDOSCOPIC ULTRASOUND WITH SMALL BOWEL AND GASTRIC BIOPSY;  Surgeon: Fidel Salter MD;  Location: [facility];  Service: Gastroenterology    JOINT REPLACEMENT Right     TKA    ORTHOPEDIC SURGERY      RELEASE CARPAL TUNNEL Bilateral     RELEASE TRIGGER FINGER Bilateral       Allergies   Allergen Reactions    Ciprofloxacin Unknown    Codeine Other (See Comments)     Abd pain    Lisinopril Cough    Morphine Other (See Comments)     Abd pain    Sulfamethizole Nausea and Vomiting      Social History     Tobacco Use    Smoking status: Never     Passive exposure: Never     Smokeless tobacco: Never   Substance Use Topics    Alcohol use: Not Currently     Comment: last consumed >1 year ago      Wt Readings from Last 1 Encounters:   11/10/23 87.3 kg (192 lb 8 oz)        Anesthesia Evaluation            ROS/MED HX  ENT/Pulmonary:  - neg pulmonary ROS     Neurologic:  - neg neurologic ROS     Cardiovascular:     (+)  hypertension- -   -  - -                                      METS/Exercise Tolerance: >4 METS    Hematologic:  - neg hematologic  ROS     Musculoskeletal:  - neg musculoskeletal ROS     GI/Hepatic: Comment: anechoic lesion suggestive of a cyst was identified in the pancreatic body    (+) GERD,     hiatal hernia,              Renal/Genitourinary:     (+) renal disease,             Endo:     (+)               Obesity,       Psychiatric/Substance Use:  - neg psychiatric ROS     Infectious Disease:  - neg infectious disease ROS     Malignancy:  - neg malignancy ROS     Other:  - neg other ROS          Physical Exam    Airway  airway exam normal      Mallampati: II       Respiratory Devices and Support         Dental  no notable dental history         Cardiovascular   cardiovascular exam normal       Rhythm and rate: regular and normal     Pulmonary   pulmonary exam normal        breath sounds clear to auscultation           OUTSIDE LABS:  CBC:   Lab Results   Component Value Date    WBC 6.7 11/20/2020    WBC 8.8 10/18/2019    HGB 14.5 11/20/2020    HGB 13.2 (L) 10/18/2019    HCT 42.5 11/20/2020    HCT 39.1 (L) 10/18/2019     11/20/2020     10/18/2019     BMP:   Lab Results   Component Value Date     04/27/2023     10/27/2022    POTASSIUM 4.0 04/27/2023    POTASSIUM 4.0 10/27/2022    CHLORIDE 99 04/27/2023    CHLORIDE 104 10/27/2022    CO2 24 04/27/2023    CO2 27 10/27/2022    BUN 19.7 04/27/2023    BUN 18.7 10/27/2022    CR 1.45 (H) 04/27/2023    CR 1.37 (H) 10/27/2022    GLC 96 04/27/2023     (H) 10/27/2022     COAGS: No results found for:  "\"PTT\", \"INR\", \"FIBR\"  POC: No results found for: \"BGM\", \"HCG\", \"HCGS\"  HEPATIC:   Lab Results   Component Value Date    ALBUMIN 4.3 11/20/2020    PROTTOTAL 7.1 11/20/2020    ALT 17 11/20/2020    AST 17 11/20/2020    ALKPHOS 86 11/20/2020    BILITOTAL 0.7 11/20/2020     OTHER:   Lab Results   Component Value Date    A1C 6.1 (H) 10/27/2022    KG 9.6 04/27/2023    PHOS 2.1 (L) 10/18/2019    TSH 1.61 04/26/2022    SED 13 10/15/2018       Anesthesia Plan    ASA Status:  3       Anesthesia Type: MAC.              Consents    Anesthesia Plan(s) and associated risks, benefits, and realistic alternatives discussed. Questions answered and patient/representative(s) expressed understanding.     - Discussed:     - Discussed with:  Patient      - Extended Intubation/Ventilatory Support Discussed: No.      - Patient is DNR/DNI Status: No     Use of blood products discussed: No .     Postoperative Care    Pain management: IV analgesics.   PONV prophylaxis: Ondansetron (or other 5HT-3), Dexamethasone or Solumedrol     Comments:    Other Comments: The patient understands and accepts the risks of MAC anesthesia including (but not limited to) nausea, vomiting, dizziness, and chipped teeth. I also discussed the possibility of conversion to GAETT/GALMA anesthesia which include hoarse voice, sore throat, and pinched lip or chipped teeth.  Versed/fent  propofol ggt  Decadron/zofran                 Yang Can MD  "

## 2023-11-10 NOTE — ANESTHESIA POSTPROCEDURE EVALUATION
Patient: Mickey Desouza    Procedure: Procedure(s):  ENDOSCOPIC ULTRASOUND, UPPER TRACT       Anesthesia Type:  No value filed.    Note:  Disposition: Outpatient   Postop Pain Control: Uneventful            Sign Out: Well controlled pain   PONV: No   Neuro/Psych: Uneventful            Sign Out: Acceptable/Baseline neuro status   Airway/Respiratory: Uneventful            Sign Out: Acceptable/Baseline resp. status   CV/Hemodynamics: Uneventful            Sign Out: Acceptable CV status; No obvious hypovolemia; No obvious fluid overload   Other NRE:    DID A NON-ROUTINE EVENT OCCUR?            Last vitals:  Vitals Value Taken Time   /70 11/10/23 1300   Temp 37.1  C (98.7  F) 11/10/23 1300   Pulse 64 11/10/23 1308   Resp 16 11/10/23 1300   SpO2 96 % 11/10/23 1308   Vitals shown include unfiled device data.    Electronically Signed By: Yang Can MD  November 10, 2023  1:39 PM

## 2023-11-16 ENCOUNTER — ALLIED HEALTH/NURSE VISIT (OUTPATIENT)
Dept: FAMILY MEDICINE | Facility: CLINIC | Age: 78
End: 2023-11-16
Payer: MEDICARE

## 2023-11-16 DIAGNOSIS — E53.8 VITAMIN B12 DEFICIENCY (NON ANEMIC): Primary | ICD-10-CM

## 2023-11-16 PROCEDURE — 96372 THER/PROPH/DIAG INJ SC/IM: CPT | Performed by: INTERNAL MEDICINE

## 2023-11-16 PROCEDURE — 99207 PR NO CHARGE NURSE ONLY: CPT

## 2023-11-16 RX ADMIN — CYANOCOBALAMIN 1000 MCG: 1000 INJECTION, SOLUTION INTRAMUSCULAR; SUBCUTANEOUS at 08:29

## 2023-12-14 ENCOUNTER — ALLIED HEALTH/NURSE VISIT (OUTPATIENT)
Dept: FAMILY MEDICINE | Facility: CLINIC | Age: 78
End: 2023-12-14
Payer: MEDICARE

## 2023-12-14 DIAGNOSIS — Z23 ENCOUNTER FOR IMMUNIZATION: Primary | ICD-10-CM

## 2023-12-14 PROCEDURE — 99207 PR NO CHARGE NURSE ONLY: CPT

## 2023-12-14 PROCEDURE — 96372 THER/PROPH/DIAG INJ SC/IM: CPT | Performed by: INTERNAL MEDICINE

## 2023-12-14 RX ADMIN — CYANOCOBALAMIN 1000 MCG: 1000 INJECTION, SOLUTION INTRAMUSCULAR; SUBCUTANEOUS at 08:32

## 2024-01-10 ENCOUNTER — TELEPHONE (OUTPATIENT)
Dept: INTERNAL MEDICINE | Facility: CLINIC | Age: 79
End: 2024-01-10
Payer: MEDICARE

## 2024-01-10 NOTE — TELEPHONE ENCOUNTER
Reason for Call:  Appointment Request    Patient requesting this type of appt:  ear infection    Requested provider: Be Maria or anyone else    Reason patient unable to be scheduled: Not within requested timeframe    When does patient want to be seen/preferred time:  asap    Comments: is wondering if he could be worked in sooner then 1-19. Please call asap    Could we send this information to you in [facility] or would you prefer to receive a phone call?:   Patient would prefer a phone call   Okay to leave a detailed message?: Yes at Other phone number:  [phone]     Call taken on 1/10/2024 at 1:50 PM by May Arnold

## 2024-01-11 ENCOUNTER — ALLIED HEALTH/NURSE VISIT (OUTPATIENT)
Dept: FAMILY MEDICINE | Facility: CLINIC | Age: 79
End: 2024-01-11
Payer: MEDICARE

## 2024-01-11 DIAGNOSIS — E53.8 VITAMIN B12 DEFICIENCY (NON ANEMIC): Primary | ICD-10-CM

## 2024-01-11 PROCEDURE — 99207 PR NO CHARGE NURSE ONLY: CPT

## 2024-01-11 PROCEDURE — 96372 THER/PROPH/DIAG INJ SC/IM: CPT | Performed by: INTERNAL MEDICINE

## 2024-01-11 RX ADMIN — CYANOCOBALAMIN 1000 MCG: 1000 INJECTION, SOLUTION INTRAMUSCULAR; SUBCUTANEOUS at 08:52

## 2024-01-11 NOTE — TELEPHONE ENCOUNTER
Outgoing call, spoke with patient.     Pt was seen at the Urgent care yesterday and was prescribed with medications already.   Pt plans to try prescribed medications first.   Pt still wants to keep the appointment for next week in case symptoms are not improving.   Pt plan to cancel appointment if symptoms are improving overtime.     Pt does not want a sooner appointment time at this time.   No further questions.     Pay P. RN

## 2024-01-19 ENCOUNTER — OFFICE VISIT (OUTPATIENT)
Dept: INTERNAL MEDICINE | Facility: CLINIC | Age: 79
End: 2024-01-19
Payer: MEDICARE

## 2024-01-19 VITALS
DIASTOLIC BLOOD PRESSURE: 68 MMHG | HEIGHT: 65 IN | TEMPERATURE: 97.8 F | HEART RATE: 76 BPM | OXYGEN SATURATION: 97 % | BODY MASS INDEX: 32.32 KG/M2 | WEIGHT: 194 LBS | SYSTOLIC BLOOD PRESSURE: 120 MMHG | RESPIRATION RATE: 16 BRPM

## 2024-01-19 DIAGNOSIS — I10 ESSENTIAL HYPERTENSION: ICD-10-CM

## 2024-01-19 DIAGNOSIS — K86.2 PANCREAS CYST: ICD-10-CM

## 2024-01-19 DIAGNOSIS — H66.92 OTITIS MEDIA TREATED WITH ANTIBIOTICS IN THE PAST 60 DAYS, LEFT: Primary | ICD-10-CM

## 2024-01-19 DIAGNOSIS — N18.31 STAGE 3A CHRONIC KIDNEY DISEASE (H): ICD-10-CM

## 2024-01-19 DIAGNOSIS — R73.03 PREDIABETES: ICD-10-CM

## 2024-01-19 LAB — HBA1C MFR BLD: 6 % (ref 0–5.6)

## 2024-01-19 PROCEDURE — 99213 OFFICE O/P EST LOW 20 MIN: CPT | Performed by: INTERNAL MEDICINE

## 2024-01-19 PROCEDURE — 36415 COLL VENOUS BLD VENIPUNCTURE: CPT | Performed by: INTERNAL MEDICINE

## 2024-01-19 PROCEDURE — 80048 BASIC METABOLIC PNL TOTAL CA: CPT | Performed by: INTERNAL MEDICINE

## 2024-01-19 PROCEDURE — 83036 HEMOGLOBIN GLYCOSYLATED A1C: CPT | Performed by: INTERNAL MEDICINE

## 2024-01-19 NOTE — PATIENT INSTRUCTIONS
Follow-up after visit to the [facility] urgency room January 10, 2024, diagnosed with left otitis media    78-year-old man, retired  and      Left otitis media, symptomatically seems to have responded nicely to amoxicillin 875 mg twice a day for 5 days, started January 10, 2024, ended January 15, 2024    I reviewed the note from [facility] urgency room, where it is reported having had left ear symptoms with pressure and reduced hearing for about 10 days.  There examination reported dull light reflex on the superior aspect of the drum and some mild erythema.    Mickey told me that at about the same time he was experiencing symptoms that sounded like a common cold with some sore throat and scratchiness.    Today January 19, 2024, Mickey reports that his ear left ear is feeling better.  On examination, I still see a little bit of redness on the superior portion of the tympanic membrane on the left.  Right side looks totally normal.  Oropharynx clear.    I told Mickey that the ear infection likely was triggered by a virus, and in fact may be the whole thing is viral.  He did seem to respond to the course of antibiotics, which is now completed.  I told Mickey that at this point we should not use any more antibiotics, and just let his body recover.    His blood pressure looks good  Since he is here today, lets check the status of his diabetes with a basic metabolic panel and hemoglobin A1c level.  I told Mickey he could do an annual wellness visit anytime in 2024  Since we are doing some lab work today January 19, 2024, perhaps Mickey could schedule his annual wellness visit for sometime mid year let, around June or July    Pancreatic cyst  Endoscopic ultrasound for surveillance 11-  Impression:  - A cystic lesion was seen in the pancreatic body.                          Tissue has not been obtained. However, the endosonographic appearance is highly suspicious for a  branched intraductal papillary  mucinous neoplasm.                          - Normal examined esophagus.   Recommendation:                          - Repeat the upper endoscopic ultrasound in 2 years                          for surveillance.     surveillance upper endoscopy ultrasound on November 11, 2021,  Dr. Fidel Salter     History of recurrent bouts of pancreatitis 1989, 1990, 96, 2008, with a 1.1 x 1.9 cm pancreatic cyst most recently imaged by CT October 19, 2018, stable in size.  He told me that alcohol consumption is 0.     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   He goes to [facility] in [place] to get aquatic therapy      No longer bothered by Loose stools, gas, sometimes diarrhea, which tends to occur after breakfast.  Consider possibility of lactose intolerance.     Chronic laryngitis/globus sensation from his known acid reflux and hiatal hernia  As of April 27, 2023, dentist told me that his throat is doing quite well.  He had a helpful consultation with otolaryngology Dr. Bradley March 9, 2022 because of chronic throat pain  Dr. Bradley's observed chronic laryngitis on fiberoptic laryngoscopy  I also reminded him of the importance of not lying down within 2 hours of eating      Peripheral neuropathy with numbness in his feet, also restless leg syndrome, has been working with neurologist Dr. Adame, and is currently on gabapentin 100 mg capsules  300 mg at 7:00 PM, 200 mg at 9:00 PM and 200 mg at 10:00 PM, Disp-630 capsule     He has undergone EMG which demonstrated neuropathy.  Laboratory work did not identify an underlying cause.  He  experiences pins-and-needles dysesthesias.  Ferritin level was normal.  Past trial of Cymbalta did not produce much relief.      Trial of topiramate was unsuccessful because of intolerable side effects of sleepiness, and therefore discontinued yesterday October 25, 2021     Lumbar spinal stenosis, for which has been getting epidural steroid injections approximately twice a year.    He recalls last epidural steroid injection was in June 2021, which gave him relief for a couple months, but he is hurting again.     He is working with a chiropractor twice a week  But I think he might benefit from some additional physical therapy.  [facility] would [facility] [place], slight entered external referral.     He told me that he might use 1 or 2 tramadol tablets a week for his back.  I reminded him to minimize the use of the tramadol, because it can contribute to constipation.  I will renew his prescription today for 30 tablets.     Essential hypertension, blood pressure nicely controlled on combination of losartan 50 mg a day with hydrochlorothiazide 12.5 mg a day.  Goal blood pressure is 120/80.     BP Readings from Last 6 Encounters:   01/19/24 120/68   11/10/23 139/70   10/31/23 110/72   07/25/23 121/74   04/27/23 124/70   01/24/23 138/80     lipids good, no medicine November 2020  Cholesterol      <=199 mg/dL   157   Triglycerides    <=149 mg/dL   133   Direct Measure HDL   >=40 mg/dL     44   LDL Cholesterol Calculated    <=129 mg/dL   86      Chronic kidney disease stage 3A with GFR of 53  4-  Creatinine  0.67 - 1.17 mg/dL 1.45 High       GFR Estimate  >60 mL/min/1.73m2 49 Low       Benign prostatic hyperplasia with urinary frequency, Flomax was ineffective, PSA was quite satisfactory only 1.0 measured November 20, 2020.  He told me that he empties his bladder completely, and that is the most important thing.     Prediabetes in the context of obesity    Latest Reference Range & Units 10/27/22  07:48   Hemoglobin A1C 0.0 - 5.6 % 6.1 (H)      Obesity with body mass index of 32.4.  I would like to see him lose 20 pounds this year.  I reminded him about the importance of eating slower, controlling portion size, and identifying problem foods to curtail or eliminate, especially starches, sweets, fried foods.     Wt Readings from Last 5 Encounters:   01/19/24 88 kg (194 lb)   11/10/23 87.3 kg (192 lb 8 oz)   10/31/23 87.1 kg (192 lb)   07/25/23 87.8 kg (193 lb 8 oz)   04/27/23 88 kg (194 lb)     Chronic neck pain.  Similar to his back, he needs to work on strengthening her upper back muscles also deep neck muscles, sleep on a properly supportive pillow, and maintain mobility and flexibility.      History of pernicious anemia taking vitamin B12 injections monthly     Status post right total knee arthroplasty, initially 2004, redo in 2009, now doing well.     Status post cataract surgery both eyes 2018, dry eye syndrome recently prescribed Restasis drops.     Constipation, he told me that he might go 2 to 3 days without a bowel movement.  I reminded him to minimize the tramadol opioid.  He asked about Linzess, but I think he needs to first go with the first line management which would be MiraLAX (polyethylene glycol powder).  I told him that he could take 1 capful of MiraLAX powder a day, diluted into his favorite beverage.     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     Vaccinated and boosted X1 for COVID, third shot October 26, 2021  Mickey opts out of flu vaccine and COVID-19 booster.    Immunization History   Administered Date(s) Administered    COVID-19 MONOVALENT 12+  (Pfizer) 02/12/2021, 03/05/2021, 10/26/2021    TDAP (Adacel,Boostrix) 08/17/2011    Td (Adult), Adsorbed 01/01/2006

## 2024-01-19 NOTE — PROGRESS NOTES
Office Visit - Follow Up   Mickey Desouza   78 year old male    Date of Visit: 1/19/2024    Chief Complaint   Patient presents with    Otalgia     Left ear        -------------------------------------------------------------------------------------------------------------------------  Assessment and Plan    Follow-up after visit to the [facility] urgency room January 10, 2024, diagnosed with left otitis media    78-year-old man, retired  and      Left otitis media, symptomatically seems to have responded nicely to amoxicillin 875 mg twice a day for 5 days, started January 10, 2024, ended January 15, 2024    I reviewed the note from [facility] urgency room, where it is reported having had left ear symptoms with pressure and reduced hearing for about 10 days.  There examination reported dull light reflex on the superior aspect of the drum and some mild erythema.    Mickey told me that at about the same time he was experiencing symptoms that sounded like a common cold with some sore throat and scratchiness.    Today January 19, 2024, Mickey reports that his ear left ear is feeling better.  On examination, I still see a little bit of redness on the superior portion of the tympanic membrane on the left.  Right side looks totally normal.  Oropharynx clear.    I told Mickey that the ear infection likely was triggered by a virus, and in fact may be the whole thing is viral.  He did seem to respond to the course of antibiotics, which is now completed.  I told Mickey that at this point we should not use any more antibiotics, and just let his body recover.    His blood pressure looks good  Since he is here today, lets check the status of his diabetes with a basic metabolic panel and hemoglobin A1c level.  I told Mickey he could do an annual wellness visit anytime in 2024  Since we are doing some lab work today January 19, 2024, perhaps Mickey could schedule his annual wellness visit for sometime mid year let,  around June or July    Pancreatic cyst  Endoscopic ultrasound for surveillance 11-  Impression:  - A cystic lesion was seen in the pancreatic body.                          Tissue has not been obtained. However, the endosonographic appearance is highly suspicious for a  branched intraductal papillary mucinous neoplasm.                          - Normal examined esophagus.   Recommendation:                          - Repeat the upper endoscopic ultrasound in 2 years                          for surveillance.     surveillance upper endoscopy ultrasound on November 11, 2021,  Dr. Fidel Salter     History of recurrent bouts of pancreatitis 1989, 1990, 96, 2008, with a 1.1 x 1.9 cm pancreatic cyst most recently imaged by CT October 19, 2018, stable in size.  He told me that alcohol consumption is 0.     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   He goes to [facility] in [place] to get aquatic therapy      No longer bothered by Loose stools, gas, sometimes diarrhea, which tends to occur after breakfast.  Consider possibility of lactose intolerance.     Chronic laryngitis/globus sensation from his known acid reflux and hiatal hernia  As of April 27, 2023, dentist told me that his throat is doing quite well.  He had a helpful consultation with otolaryngology Dr. Bradley March 9, 2022 because of chronic throat pain  Dr. Bradley's observed chronic laryngitis on fiberoptic laryngoscopy  I also reminded him of the importance of not lying down within 2 hours of eating      Peripheral neuropathy with numbness in his feet, also  restless leg syndrome, has been working with neurologist Dr. Adame, and is currently on gabapentin 100 mg capsules  300 mg at 7:00 PM, 200 mg at 9:00 PM and 200 mg at 10:00 PM, Disp-630 capsule     He has undergone EMG which demonstrated neuropathy.  Laboratory work did not identify an underlying cause.  He experiences pins-and-needles dysesthesias.  Ferritin level was normal.  Past trial of Cymbalta did not produce much relief.      Trial of topiramate was unsuccessful because of intolerable side effects of sleepiness, and therefore discontinued yesterday October 25, 2021     Lumbar spinal stenosis, for which has been getting epidural steroid injections approximately twice a year.    He recalls last epidural steroid injection was in June 2021, which gave him relief for a couple months, but he is hurting again.     He is working with a chiropractor twice a week  But I think he might benefit from some additional physical therapy.  He would like to go to [facility] in [place], slight entered external referral.     He told me that he might use 1 or 2 tramadol tablets a week for his back.  I reminded him to minimize the use of the tramadol, because it can contribute to constipation.  I will renew his prescription today for 30 tablets.     Essential hypertension, blood pressure nicely controlled on combination of losartan 50 mg a day with hydrochlorothiazide 12.5 mg a day.  Goal blood pressure is 120/80.     BP Readings from Last 6 Encounters:   01/19/24 120/68   11/10/23 139/70   10/31/23 110/72   07/25/23 121/74   04/27/23 124/70   01/24/23 138/80     lipids good, no medicine November 2020  Cholesterol      <=199 mg/dL   157   Triglycerides    <=149 mg/dL   133   Direct Measure HDL   >=40 mg/dL     44   LDL Cholesterol Calculated    <=129 mg/dL   86      Chronic kidney disease stage 3A with GFR of 53  4-  Creatinine  0.67 - 1.17 mg/dL 1.45 High       GFR Estimate  >60 mL/min/1.73m2 49 Low       Benign  prostatic hyperplasia with urinary frequency, Flomax was ineffective, PSA was quite satisfactory only 1.0 measured November 20, 2020.  He told me that he empties his bladder completely, and that is the most important thing.     Prediabetes in the context of obesity    Latest Reference Range & Units 10/27/22 07:48   Hemoglobin A1C 0.0 - 5.6 % 6.1 (H)      Obesity with body mass index of 32.4.  I would like to see him lose 20 pounds this year.  I reminded him about the importance of eating slower, controlling portion size, and identifying problem foods to curtail or eliminate, especially starches, sweets, fried foods.     Wt Readings from Last 5 Encounters:   01/19/24 88 kg (194 lb)   11/10/23 87.3 kg (192 lb 8 oz)   10/31/23 87.1 kg (192 lb)   07/25/23 87.8 kg (193 lb 8 oz)   04/27/23 88 kg (194 lb)     Chronic neck pain.  Similar to his back, he needs to work on strengthening her upper back muscles also deep neck muscles, sleep on a properly supportive pillow, and maintain mobility and flexibility.      History of pernicious anemia taking vitamin B12 injections monthly     Status post right total knee arthroplasty, initially 2004, redo in 2009, now doing well.     Status post cataract surgery both eyes 2018, dry eye syndrome recently prescribed Restasis drops.     Constipation, he told me that he might go 2 to 3 days without a bowel movement.  I reminded him to minimize the tramadol opioid.  He asked about Linzess, but I think he needs to first go with the first line management which would be MiraLAX (polyethylene glycol powder).  I told him that he could take 1 capful of MiraLAX powder a day, diluted into his favorite beverage.     Resolved 1 to 2-second episodes from April 2021 of blurry vision, lightheadedness, pressure behind eyes, which I wonder whether this might be symptoms of sinus congestion and irritation; long history of seasonal allergic rhinitis     History of squamous cell cancer left cheek treated  with external beam radiation, and he follows up with his dermatologist regularly every 6 months.     Vaccinated and boosted X1 for COVID, third shot October 26, 2021  Mickey opts out of flu vaccine and COVID-19 booster.    Immunization History   Administered Date(s) Administered    COVID-19 MONOVALENT 12+ (Pfizer) 02/12/2021, 03/05/2021, 10/26/2021    TDAP (Adacel,Boostrix) 08/17/2011    Td (Adult), Adsorbed 01/01/2006          --------------------------------------------------------------------------------------------------------------------------  History of Present Illness  This 78 year old old         Reason for visit:  Follow-up for middle ear infection; on 1/10/24 was seen at the Urgency Room; prescribed  Amoxicillin, 875 mg. 2 x day, for 5 days  Symptom onset:  3-7 days ago  Symptoms include:  Ear has aching pain and feels  plugged up   Symptom intensity:  Moderate  Symptom progression:  Staying the same  Had these symptoms before:  No  What makes it worse:  Going outside in the cold, even when ear is covered  What makes it better:  Temporarily feels better after a warm  compress is applied     He eats 0-1 servings of fruits and vegetables daily.He consumes 1 sweetened beverage(s) daily.He exercises with enough effort to increase his heart rate 20 to 29 minutes per day.  He exercises with enough effort to increase his heart rate 3 or less days per week.   He is taking medications regularly.       Wt Readings from Last 3 Encounters:   01/19/24 88 kg (194 lb)   11/10/23 87.3 kg (192 lb 8 oz)   10/31/23 87.1 kg (192 lb)     BP Readings from Last 3 Encounters:   01/19/24 120/68   11/10/23 139/70   10/31/23 110/72       ---------------------------------------------------------------------------------------------------------------------------    Medications, Allergies, Social, and Problem List       Current Outpatient Medications   Medication Sig Dispense Refill    aspirin (ASA) 81 MG chewable tablet Take 81 mg by  "mouth as needed for pain      Cyanocobalamin 1000 MCG/ML KIT Inject 1,000 mcg as directed every 30 days      gabapentin (NEURONTIN) 100 MG capsule 200 mg at 4:00 PM, 200 mg at 7:00 PM and 200 mg at 9:00  capsule 3    glucosamine-chondroitin 500-400 MG CAPS per capsule   Instructions: Take 1 capsule by mouth 2 (two) times a day., Type: Maintenance      losartan-hydrochlorothiazide (HYZAAR) 50-12.5 MG tablet Take 1 tablet by mouth daily 90 tablet 3    Multiple Vitamins-Minerals (DAILY MULTIVITAMIN PO)   Instructions: Take 1 tablet by mouth daily., Type: Maintenance      omeprazole (PRILOSEC) 40 MG DR capsule Take 40 mg by mouth daily      aspirin 81 MG EC tablet Take 81 mg by mouth as needed for pain       Allergies   Allergen Reactions    Ciprofloxacin Unknown    Codeine Other (See Comments)     Abd pain    Lisinopril Cough    Morphine Other (See Comments)     Abd pain    Sulfamethizole Nausea and Vomiting     Social History     Tobacco Use    Smoking status: Never     Passive exposure: Never    Smokeless tobacco: Never   Vaping Use    Vaping Use: Never used   Substance Use Topics    Alcohol use: Not Currently     Comment: last consumed >1 year ago    Drug use: No     Patient Active Problem List   Diagnosis    Urinary frequency    Restless legs syndrome    Neuropathy    Neuropathic pain    Neck pain, chronic    Hypertension    Allergic rhinitis    Burning sensation of feet    Gastroesophageal reflux disease    Obesity    Pancreatic cyst    Spinal stenosis, lumbar region, with neurogenic claudication        Reviewed, reconciled and updated       Physical Exam   General Appearance:       /68 (BP Location: Right arm, Patient Position: Sitting, Cuff Size: Adult Regular)   Pulse 76   Temp 97.8  F (36.6  C)   Resp 16   Ht 1.651 m (5' 5\")   Wt 88 kg (194 lb)   SpO2 97%   BMI 32.28 kg/m      Today January 19, 2024, Mickey reports that his ear left ear is feeling better.  On examination, I still see a " little bit of redness on the superior portion of the tympanic membrane on the left.  Right side looks totally normal.  Oropharynx clear.     Additional Information        BORA AC MD, MD

## 2024-01-20 LAB
ANION GAP SERPL CALCULATED.3IONS-SCNC: 12 MMOL/L (ref 7–15)
BUN SERPL-MCNC: 20.5 MG/DL (ref 8–23)
CALCIUM SERPL-MCNC: 9.3 MG/DL (ref 8.8–10.2)
CHLORIDE SERPL-SCNC: 98 MMOL/L (ref 98–107)
CREAT SERPL-MCNC: 1.43 MG/DL (ref 0.67–1.17)
DEPRECATED HCO3 PLAS-SCNC: 26 MMOL/L (ref 22–29)
EGFRCR SERPLBLD CKD-EPI 2021: 50 ML/MIN/1.73M2
GLUCOSE SERPL-MCNC: 106 MG/DL (ref 70–99)
POTASSIUM SERPL-SCNC: 4.3 MMOL/L (ref 3.4–5.3)
SODIUM SERPL-SCNC: 136 MMOL/L (ref 135–145)

## 2024-02-08 ENCOUNTER — ALLIED HEALTH/NURSE VISIT (OUTPATIENT)
Dept: FAMILY MEDICINE | Facility: CLINIC | Age: 79
End: 2024-02-08
Payer: MEDICARE

## 2024-02-08 DIAGNOSIS — Z23 ENCOUNTER FOR IMMUNIZATION: Primary | ICD-10-CM

## 2024-02-08 PROCEDURE — 96372 THER/PROPH/DIAG INJ SC/IM: CPT | Performed by: INTERNAL MEDICINE

## 2024-02-08 PROCEDURE — 99207 PR NO CHARGE NURSE ONLY: CPT

## 2024-02-08 RX ADMIN — CYANOCOBALAMIN 1000 MCG: 1000 INJECTION, SOLUTION INTRAMUSCULAR; SUBCUTANEOUS at 08:09

## 2024-02-21 ENCOUNTER — TRANSFERRED RECORDS (OUTPATIENT)
Dept: HEALTH INFORMATION MANAGEMENT | Facility: CLINIC | Age: 79
End: 2024-02-21
Payer: MEDICARE

## 2024-03-07 ENCOUNTER — ALLIED HEALTH/NURSE VISIT (OUTPATIENT)
Dept: FAMILY MEDICINE | Facility: CLINIC | Age: 79
End: 2024-03-07
Payer: MEDICARE

## 2024-03-07 DIAGNOSIS — E53.8 VITAMIN B12 DEFICIENCY (NON ANEMIC): Primary | ICD-10-CM

## 2024-03-07 PROCEDURE — 99207 PR NO CHARGE NURSE ONLY: CPT

## 2024-03-07 PROCEDURE — 96372 THER/PROPH/DIAG INJ SC/IM: CPT | Performed by: INTERNAL MEDICINE

## 2024-03-07 RX ADMIN — CYANOCOBALAMIN 1000 MCG: 1000 INJECTION, SOLUTION INTRAMUSCULAR; SUBCUTANEOUS at 08:02

## 2024-03-08 ENCOUNTER — OFFICE VISIT (OUTPATIENT)
Dept: OTOLARYNGOLOGY | Facility: CLINIC | Age: 79
End: 2024-03-08
Payer: MEDICARE

## 2024-03-08 ENCOUNTER — OFFICE VISIT (OUTPATIENT)
Dept: AUDIOLOGY | Facility: CLINIC | Age: 79
End: 2024-03-08
Payer: MEDICARE

## 2024-03-08 DIAGNOSIS — H90.3 SENSORINEURAL HEARING LOSS (SNHL) OF BOTH EARS: Primary | ICD-10-CM

## 2024-03-08 DIAGNOSIS — M26.609 TMJ (TEMPOROMANDIBULAR JOINT SYNDROME): Primary | ICD-10-CM

## 2024-03-08 DIAGNOSIS — H90.3 SENSORINEURAL HEARING LOSS (SNHL) OF BOTH EARS: ICD-10-CM

## 2024-03-08 DIAGNOSIS — H92.02 OTALGIA, LEFT: ICD-10-CM

## 2024-03-08 PROCEDURE — 92567 TYMPANOMETRY: CPT | Performed by: AUDIOLOGIST

## 2024-03-08 PROCEDURE — 99213 OFFICE O/P EST LOW 20 MIN: CPT | Mod: 25 | Performed by: OTOLARYNGOLOGY

## 2024-03-08 PROCEDURE — 92557 COMPREHENSIVE HEARING TEST: CPT | Performed by: AUDIOLOGIST

## 2024-03-08 NOTE — PROGRESS NOTES
CHIEF COMPLAINT: Patient presents with:  RECHECK: ED Follow up 01/24, treated for Left Ear infection, continues to have a dull aching pain, sore throat that comes and goes mainly on left side, does have GERD            HISTORY OF PRESENT ILLNESS    Mickey was seen at the behest of Be Maria MD for ear conern.    AUDIOLOGY NOTE:    Patient reports decreased hearing, otalgia, and aural fullness in the left ear for the past 2 months. He reports being put on an  antibiotic about 2 months ago which helped his symptoms for a short time, but they have returned. He continues to have a dull ache in the  left ear and aural fullness. Being in the wind without a hat irritates his left ear. He had tinnitus in the left ear when this all began, but after  the antibiotic this resolved. He is also having a sore throat on the left side and he has seen ENT for this and was told it is due to acid reflux.  He has had a cyst removed from his left pinna about 30 years ago. He denies otorrhea, dizziness, family history of hearing loss, noise  exposure, and prior use of amplification.  RESULTS: Otoscopy: clear ear canals, bilaterally. Tymps: Type As bilaterally. Reflexes: CNT due to equipment limitations. Audio: normal  hearing 250-2000 Hz sloping to a moderate SNHL right ear and normal hearing 250-2000 Hz sloping to moderately-severe SNHL left ear.  REC: F/U with ENT. Patient is not interested in hearing aids at this time.       REVIEW OF SYSTEMS    Review of Systems as per HPI and PMHx, otherwise 10 system review system are negative.       ALLERGIES    Ciprofloxacin, Codeine, Lisinopril, Morphine, and Sulfamethizole    CURRENT MEDICATIONS      Current Outpatient Medications:     aspirin (ASA) 81 MG chewable tablet, Take 81 mg by mouth as needed for pain, Disp: , Rfl:     Cyanocobalamin 1000 MCG/ML KIT, Inject 1,000 mcg as directed every 30 days, Disp: , Rfl:     gabapentin (NEURONTIN) 100 MG capsule, 200 mg at 4:00 PM, 200 mg at 7:00  PM and 200 mg at 9:00 PM, Disp: 540 capsule, Rfl: 3    glucosamine-chondroitin 500-400 MG CAPS per capsule,  Instructions: Take 1 capsule by mouth 2 (two) times a day., Type: Maintenance, Disp: , Rfl:     losartan-hydrochlorothiazide (HYZAAR) 50-12.5 MG tablet, Take 1 tablet by mouth daily, Disp: 90 tablet, Rfl: 3    Multiple Vitamins-Minerals (DAILY MULTIVITAMIN PO),  Instructions: Take 1 tablet by mouth daily., Type: Maintenance, Disp: , Rfl:     omeprazole (PRILOSEC) 40 MG DR capsule, Take 40 mg by mouth daily, Disp: , Rfl:     Current Facility-Administered Medications:     cyanocobalamin injection 1,000 mcg, 1,000 mcg, Intramuscular, Q30 Days, Be Maria MD, 1,000 mcg at 03/07/24 0802    cyanocobalamin injection 1,000 mcg, 1,000 mcg, Intramuscular, Q30 Days, Be Maria MD, 1,000 mcg at 03/09/23 0831     PAST MEDICAL HISTORY    PAST MEDICAL HISTORY:   Past Medical History:   Diagnosis Date    Allergic sinusitis     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    BPH (benign prostatic hyperplasia)     Burning sensation of feet     Cancer (H)     basal cell skin    Chronic kidney disease     Stage 3    Chronic laryngitis     Chronic neck pain     GERD (gastroesophageal reflux disease)     Hiatal hernia     Hypertension 03/23/2021    Lumbar spinal stenosis     Neuropathic pain     Neuropathy     Obesity     Osteoarthritis     Pancreatic cyst     Pancreatitis, recurrent     Peripheral neuropathy     Pernicious anemia     Restless leg syndrome     SCC (squamous cell carcinoma), face     Cheek    Seasonal allergic rhinitis     Spinal stenosis, lumbar region, with neurogenic claudication        PAST SURGICAL HISTORY    PAST SURGICAL HISTORY:   Past Surgical History:   Procedure Laterality Date    ARTHROSCOPY KNEE Bilateral     CATARACT EXTRACTION Bilateral     CHOLECYSTECTOMY  01/01/1995    ESOPHAGOSCOPY, GASTROSCOPY, DUODENOSCOPY (EGD), COMBINED N/A 12/21/2018    Procedure: ENDOSCOPIC ULTRASOUND FINE NEEDLE  ASPIRATION;  Surgeon: Fidel Salter MD;  Location: [facility];  Service: Gastroenterology    ESOPHAGOSCOPY, GASTROSCOPY, DUODENOSCOPY (EGD), COMBINED N/A 11/15/2019    Procedure: ENDOSCOPIC ULTRASOUND WITH SMALL BOWEL AND GASTRIC BIOPSY;  Surgeon: Fidel Salter MD;  Location: [facility];  Service: Gastroenterology    ESOPHAGOSCOPY, GASTROSCOPY, DUODENOSCOPY (EGD), COMBINED N/A 11/10/2023    Procedure: ENDOSCOPIC ULTRASOUND, UPPER TRACT;  Surgeon: Fidel Salter MD;  Location: [facility]    JOINT REPLACEMENT Right     TKA    ORTHOPEDIC SURGERY      RELEASE CARPAL TUNNEL Bilateral     RELEASE TRIGGER FINGER Bilateral        FAMILY  HISTORY    FAMILY HISTORY:   Family History   Problem Relation Age of Onset    Coronary Artery Disease Father     Pancreatic Cancer Mother     Breast Cancer Sister     Liver Disease Brother     Other - See Comments Sister         head bleed after fall    Other - See Comments Brother     Neuropathy Brother     Diabetes Brother     Diabetes Brother     Other - See Comments Brother         WWII    No Known Problems Son        SOCIAL HISTORY    SOCIAL HISTORY:   Social History     Tobacco Use    Smoking status: Never     Passive exposure: Never    Smokeless tobacco: Never   Substance Use Topics    Alcohol use: Not Currently     Comment: last consumed >1 year ago        PHYSICAL EXAM    HEAD: Normal appearance and symmetry:  No cutaneous lesions.      NECK:  supple     EARS:    Right:  TM intact nl    LEFT:  TM intact nl     EYES:  EOMI    CN VII/XII:  intact     NOSE:     Dorsum:   straight  Septum:  midline  Mucosa:  moist        ORAL CAVITY/OROPHARYNX:     Lips:  Normal.  Tongue: normal, midline  Mucosa:   no lesions     NECK:  Trachea:  midline.              Thyroid:  normal              Adenopathy:  none        NEURO:   Alert and Oriented     GAIT AND STATION:  normal     RESPIRATORY:   Symmetry and Respiratory effort     PSYCH:  Normal mood and affect      SKIN:   warm and dry         IMPRESSION:    Encounter Diagnoses   Name Primary?    TMJ (temporomandibular joint syndrome) Yes    Otalgia, left     Sensorineural hearing loss (SNHL) of both ears           RECOMMENDATIONS:    TMJ precautions discussed  Referral to [facility]   Return annually for hearing test

## 2024-03-08 NOTE — PATIENT INSTRUCTIONS
Now that we've ruled out ENT conditions, the next step is evaluation at [facility], where transmandibular joint disorder (TMD or TMJ) and  orofacial pain are treated.  We'll put in a referral for you, but you can certainly call [phone] to schedule at your convenience. Thanks for allowing us the opportunity to get you the care you need!

## 2024-03-08 NOTE — LETTER
3/8/2024         RE: Mickey Desouza  [address] [facility] [address]        Dear Colleague,    Thank you for referring your patient, Mickey Desouza, to the [facility]. Please see a copy of my visit note below.    CHIEF COMPLAINT: Patient presents with:  RECHECK: ED Follow up 01/24, treated for Left Ear infection, continues to have a dull aching pain, sore throat that comes and goes mainly on left side, does have GERD            HISTORY OF PRESENT ILLNESS    Mickey was seen at the behest of No ref. provider found for dull left ear pain, pain anterior to ear and radiates down to throat on that side,   since AOM tx'd with amoxicillin 1/10/24 at .  Saw PCP subsequently who didn't note further infection on exam.  Windy cold days will make it worse.  Does have stiff neck on this side as well.  Sees chiro sometimes.  Saw an unknown provider a long time ago for TMJ, patient not sure what side. His dentist told him he was grinding his teeth at night, couldn't tolerate the mouth guard.  Alleve does help with the pain      Keeps turning up TV- wife notes it is very loud.  Denies known loud sound exposures.      Hx chronic GERD with globus sensation on left for several years, saw ENT 3/9/22 and had normal scope, had normal upper EGD about 5 months ago.  Still drinking coffee but quite soda.             REVIEW OF SYSTEMS    Review of Systems as per HPI and PMHx, otherwise 10 system review system are negative.       ALLERGIES    Ciprofloxacin, Codeine, Lisinopril, Morphine, and Sulfamethizole    CURRENT MEDICATIONS      Current Outpatient Medications:      aspirin (ASA) 81 MG chewable tablet, Take 81 mg by mouth as needed for pain, Disp: , Rfl:      Cyanocobalamin 1000 MCG/ML KIT, Inject 1,000 mcg as directed every 30 days, Disp: , Rfl:      gabapentin (NEURONTIN) 100 MG capsule, 200 mg at 4:00 PM, 200 mg at 7:00 PM and 200 mg at 9:00 PM, Disp: 540 capsule, Rfl: 3      glucosamine-chondroitin 500-400 MG CAPS per capsule,  Instructions: Take 1 capsule by mouth 2 (two) times a day., Type: Maintenance, Disp: , Rfl:      losartan-hydrochlorothiazide (HYZAAR) 50-12.5 MG tablet, Take 1 tablet by mouth daily, Disp: 90 tablet, Rfl: 3     Multiple Vitamins-Minerals (DAILY MULTIVITAMIN PO),  Instructions: Take 1 tablet by mouth daily., Type: Maintenance, Disp: , Rfl:      omeprazole (PRILOSEC) 40 MG DR capsule, Take 40 mg by mouth daily, Disp: , Rfl:     Current Facility-Administered Medications:      cyanocobalamin injection 1,000 mcg, 1,000 mcg, Intramuscular, Q30 Days, Be Maria MD, 1,000 mcg at 03/07/24 0802     cyanocobalamin injection 1,000 mcg, 1,000 mcg, Intramuscular, Q30 Days, Be Maria MD, 1,000 mcg at 03/09/23 0831     PAST MEDICAL HISTORY    PAST MEDICAL HISTORY:   Past Medical History:   Diagnosis Date     Allergic sinusitis      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     BPH (benign prostatic hyperplasia)      Burning sensation of feet      Cancer (H)     basal cell skin     Chronic kidney disease     Stage 3     Chronic laryngitis      Chronic neck pain      GERD (gastroesophageal reflux disease)      Hiatal hernia      Hypertension 03/23/2021     Lumbar spinal stenosis      Neuropathic pain      Neuropathy      Obesity      Osteoarthritis      Pancreatic cyst      Pancreatitis, recurrent      Peripheral neuropathy      Pernicious anemia      Restless leg syndrome      SCC (squamous cell carcinoma), face     Cheek     Seasonal allergic rhinitis      Spinal stenosis, lumbar region, with neurogenic claudication        PAST SURGICAL HISTORY    PAST SURGICAL HISTORY:   Past Surgical History:   Procedure Laterality Date     ARTHROSCOPY KNEE Bilateral      CATARACT EXTRACTION Bilateral      CHOLECYSTECTOMY  01/01/1995     ESOPHAGOSCOPY, GASTROSCOPY, DUODENOSCOPY (EGD), COMBINED N/A 12/21/2018    Procedure: ENDOSCOPIC ULTRASOUND FINE NEEDLE ASPIRATION;  Surgeon: Joie  Fidel PARK MD;  [place] [facility] [place]     ESOPHAGOSCOPY, GASTROSCOPY, DUODENOSCOPY (EGD), COMBINED N/A 11/15/2019    Procedure: ENDOSCOPIC ULTRASOUND WITH SMALL BOWEL AND GASTRIC BIOPSY;  Surgeon: Fidel Salter MD;  [place] [facility] [place]  Service: G[place]enterology     ESOPHAGOSCOPY, GASTROSCOPY, DUODENOSCOPY (EGD), COMBINED N/A 11/10/2023    Procedure: ENDOSCOPIC ULTRASOUND, UPPER TRACT;  Surgeon: Fidel Salter MD;  [place] [facility] [place]     JOINT REPLACEMENT Right     TKA     ORTHOPEDIC SURGERY       RELEASE CARPAL TUNNEL Bilateral      RELEASE TRIGGER FINGER Bilateral        FAMILY  HISTORY    FAMILY HISTORY:   Family History   Problem Relation Age of Onset     Coronary Artery Disease Father      Pancreatic Cancer Mother      Breast Cancer Sister      Liver Disease Brother      Other - See Comments Sister         head bleed after fall     Other - See Comments Brother      Neuropathy Brother      Diabetes Brother      Diabetes Brother      Other - See Comments Brother         WWII     No Known Problems Son        SOCIAL HISTORY    SOCIAL HISTORY:   Social History     Tobacco Use     Smoking status: Never     Passive exposure: Never     Smokeless tobacco: Never   Substance Use Topics     Alcohol use: Not Currently     Comment: last consumed >1 year ago        PHYSICAL EXAM    HEAD: Normal appearance and symmetry:  No cutaneous lesions.      NECK:  supple     EARS: Ears - The tympanic membranes are normal in appearance, bony landmarks are intact.  No retraction, perforation, or masses. .  No fluid or purulence was seen in the external canal or the middle ear. No evidence of infection of the middle ear or external canal, cerumen was normal in appearance.     + mild TTP just anterior to left tragus, jaw MELANIA, no crepitus    EYES:  EOMI    CN VII/XII:  intact     NOSE:     Dorsum:   straight  Septum:  midline  Mucosa:  moist        ORAL CAVITY/OROPHARYNX:     Lips:   Normal.  Tongue: normal, midline  Mucosa:   no lesions     NECK:  Trachea:  midline.              Thyroid:  normal              Adenopathy:  none        NEURO:   Alert and Oriented     GAIT AND STATION:  normal     RESPIRATORY:   Symmetry and Respiratory effort     PSYCH:  Normal mood and affect     SKIN:   warm and dry         IMPRESSION:    Encounter Diagnoses   Name Primary?     TMJ (temporomandibular joint syndrome) Yes     Otalgia, left      Sensorineural hearing loss (SNHL) of both ears           RECOMMENDATIONS:    Annual audiogram. Cleared for hearing aids.    Ispine referral for TMJ p/t.        No orders of the defined types were placed in this encounter.     [unfilled]       CHIEF COMPLAINT: Patient presents with:  RECHECK: ED Follow up 01/24, treated for Left Ear infection, continues to have a dull aching pain, sore throat that comes and goes mainly on left side, does have GERD            HISTORY OF PRESENT ILLNESS    Mickey was seen at the behest of Be Maria MD for ear conern.    AUDIOLOGY NOTE:    Patient reports decreased hearing, otalgia, and aural fullness in the left ear for the past 2 months. He reports being put on an  antibiotic about 2 months ago which helped his symptoms for a short time, but they have returned. He continues to have a dull ache in the  left ear and aural fullness. Being in the wind without a hat irritates his left ear. He had tinnitus in the left ear when this all began, but after  the antibiotic this resolved. He is also having a sore throat on the left side and he has seen ENT for this and was told it is due to acid reflux.  He has had a cyst removed from his left pinna about 30 years ago. He denies otorrhea, dizziness, family history of hearing loss, noise  exposure, and prior use of amplification.  RESULTS: Otoscopy: clear ear canals, bilaterally. Tymps: Type As bilaterally. Reflexes: CNT due to equipment limitations. Audio: normal  hearing 250-2000 Hz sloping to a  moderate SNHL right ear and normal hearing 250-2000 Hz sloping to moderately-severe SNHL left ear.  REC: F/U with ENT. Patient is not interested in hearing aids at this time.       REVIEW OF SYSTEMS    Review of Systems as per HPI and PMHx, otherwise 10 system review system are negative.       ALLERGIES    Ciprofloxacin, Codeine, Lisinopril, Morphine, and Sulfamethizole    CURRENT MEDICATIONS      Current Outpatient Medications:      aspirin (ASA) 81 MG chewable tablet, Take 81 mg by mouth as needed for pain, Disp: , Rfl:      Cyanocobalamin 1000 MCG/ML KIT, Inject 1,000 mcg as directed every 30 days, Disp: , Rfl:      gabapentin (NEURONTIN) 100 MG capsule, 200 mg at 4:00 PM, 200 mg at 7:00 PM and 200 mg at 9:00 PM, Disp: 540 capsule, Rfl: 3     glucosamine-chondroitin 500-400 MG CAPS per capsule,  Instructions: Take 1 capsule by mouth 2 (two) times a day., Type: Maintenance, Disp: , Rfl:      losartan-hydrochlorothiazide (HYZAAR) 50-12.5 MG tablet, Take 1 tablet by mouth daily, Disp: 90 tablet, Rfl: 3     Multiple Vitamins-Minerals (DAILY MULTIVITAMIN PO),  Instructions: Take 1 tablet by mouth daily., Type: Maintenance, Disp: , Rfl:      omeprazole (PRILOSEC) 40 MG DR capsule, Take 40 mg by mouth daily, Disp: , Rfl:     Current Facility-Administered Medications:      cyanocobalamin injection 1,000 mcg, 1,000 mcg, Intramuscular, Q30 Days, Be Maria MD, 1,000 mcg at 03/07/24 0802     cyanocobalamin injection 1,000 mcg, 1,000 mcg, Intramuscular, Q30 Days, Be Maria MD, 1,000 mcg at 03/09/23 0831     PAST MEDICAL HISTORY    PAST MEDICAL HISTORY:   Past Medical History:   Diagnosis Date     Allergic sinusitis      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     BPH (benign prostatic hyperplasia)      Burning sensation of feet      Cancer (H)     basal cell skin     Chronic kidney disease     Stage 3     Chronic laryngitis      Chronic neck pain      GERD (gastroesophageal reflux disease)      Hiatal hernia       Hypertension 03/23/2021     Lumbar spinal stenosis      Neuropathic pain      Neuropathy      Obesity      Osteoarthritis      Pancreatic cyst      Pancreatitis, recurrent      Peripheral neuropathy      Pernicious anemia      Restless leg syndrome      SCC (squamous cell carcinoma), face     Cheek     Seasonal allergic rhinitis      Spinal stenosis, lumbar region, with neurogenic claudication        PAST SURGICAL HISTORY    PAST SURGICAL HISTORY:   Past Surgical History:   Procedure Laterality Date     ARTHROSCOPY KNEE Bilateral      CATARACT EXTRACTION Bilateral      CHOLECYSTECTOMY  01/01/1995     ESOPHAGOSCOPY, GASTROSCOPY, DUODENOSCOPY (EGD), COMBINED N/A 12/21/2018    Procedure: ENDOSCOPIC ULTRASOUND FINE NEEDLE ASPIRATION;  Surgeon: Fidel Salter MD;  Location: [facility];  Service: Gastroenterology     ESOPHAGOSCOPY, GASTROSCOPY, DUODENOSCOPY (EGD), COMBINED N/A 11/15/2019    Procedure: ENDOSCOPIC ULTRASOUND WITH SMALL BOWEL AND GASTRIC BIOPSY;  Surgeon: Fidel Salter MD;  Location: [facility];  Service: Gastroenterology     ESOPHAGOSCOPY, GASTROSCOPY, DUODENOSCOPY (EGD), COMBINED N/A 11/10/2023    Procedure: ENDOSCOPIC ULTRASOUND, UPPER TRACT;  Surgeon: Fidel Salter MD;  [place] [facility] [place]     JOINT REPLACEMENT Right     TKA     ORTHOPEDIC SURGERY       RELEASE CARPAL TUNNEL Bilateral      RELEASE TRIGGER FINGER Bilateral        FAMILY  HISTORY    FAMILY HISTORY:   Family History   Problem Relation Age of Onset     Coronary Artery Disease Father      Pancreatic Cancer Mother      Breast Cancer Sister      Liver Disease Brother      Other - See Comments Sister         head bleed after fall     Other - See Comments Brother      Neuropathy Brother      Diabetes Brother      Diabetes Brother      Other - See Comments Brother         WWII     No Known Problems Son        SOCIAL HISTORY    SOCIAL HISTORY:   Social History     Tobacco Use     Smoking status: Never     Passive  exposure: Never     Smokeless tobacco: Never   Substance Use Topics     Alcohol use: Not Currently     Comment: last consumed >1 year ago        PHYSICAL EXAM    HEAD: Normal appearance and symmetry:  No cutaneous lesions.      NECK:  supple     EARS:    Right:  TM intact nl    LEFT:  TM intact nl     EYES:  EOMI    CN VII/XII:  intact     NOSE:     Dorsum:   straight  Septum:  midline  Mucosa:  moist        ORAL CAVITY/OROPHARYNX:     Lips:  Normal.  Tongue: normal, midline  Mucosa:   no lesions     NECK:  Trachea:  midline.              Thyroid:  normal              Adenopathy:  none        NEURO:   Alert and Oriented     GAIT AND STATION:  normal     RESPIRATORY:   Symmetry and Respiratory effort     PSYCH:  Normal mood and affect     SKIN:   warm and dry         IMPRESSION:    Encounter Diagnoses   Name Primary?     TMJ (temporomandibular joint syndrome) Yes     Otalgia, left      Sensorineural hearing loss (SNHL) of both ears           RECOMMENDATIONS:    TMJ precautions discussed  Referral to [facility] clinic   Return annually for hearing test        Again, thank you for allowing me to participate in the care of your patient.        Sincerely,        Kade Bernardo MD

## 2024-03-08 NOTE — PROGRESS NOTES
CHIEF COMPLAINT: Patient presents with:  RECHECK: ED Follow up 01/24, treated for Left Ear infection, continues to have a dull aching pain, sore throat that comes and goes mainly on left side, does have GERD            HISTORY OF PRESENT ILLNESS    Mickey was seen at the behest of No ref. provider found for dull left ear pain, pain anterior to ear and radiates down to throat on that side,   since AOM tx'd with amoxicillin 1/10/24 at .  Saw PCP subsequently who didn't note further infection on exam.  Windy cold days will make it worse.  Does have stiff neck on this side as well.  Sees chiro sometimes.  Saw an unknown provider a long time ago for TMJ, patient not sure what side. His dentist told him he was grinding his teeth at night, couldn't tolerate the mouth guard.  Alleve does help with the pain      Keeps turning up TV- wife notes it is very loud.  Denies known loud sound exposures.      Hx chronic GERD with globus sensation on left for several years, saw ENT 3/9/22 and had normal scope, had normal upper EGD about 5 months ago.  Still drinking coffee but quite soda.             REVIEW OF SYSTEMS    Review of Systems as per HPI and PMHx, otherwise 10 system review system are negative.       ALLERGIES    Ciprofloxacin, Codeine, Lisinopril, Morphine, and Sulfamethizole    CURRENT MEDICATIONS      Current Outpatient Medications:     aspirin (ASA) 81 MG chewable tablet, Take 81 mg by mouth as needed for pain, Disp: , Rfl:     Cyanocobalamin 1000 MCG/ML KIT, Inject 1,000 mcg as directed every 30 days, Disp: , Rfl:     gabapentin (NEURONTIN) 100 MG capsule, 200 mg at 4:00 PM, 200 mg at 7:00 PM and 200 mg at 9:00 PM, Disp: 540 capsule, Rfl: 3    glucosamine-chondroitin 500-400 MG CAPS per capsule,  Instructions: Take 1 capsule by mouth 2 (two) times a day., Type: Maintenance, Disp: , Rfl:     losartan-hydrochlorothiazide (HYZAAR) 50-12.5 MG tablet, Take 1 tablet by mouth daily, Disp: 90 tablet, Rfl: 3    Multiple  Vitamins-Minerals (DAILY MULTIVITAMIN PO),  Instructions: Take 1 tablet by mouth daily., Type: Maintenance, Disp: , Rfl:     omeprazole (PRILOSEC) 40 MG DR capsule, Take 40 mg by mouth daily, Disp: , Rfl:     Current Facility-Administered Medications:     cyanocobalamin injection 1,000 mcg, 1,000 mcg, Intramuscular, Q30 Days, Be Maria MD, 1,000 mcg at 03/07/24 0802    cyanocobalamin injection 1,000 mcg, 1,000 mcg, Intramuscular, Q30 Days, Be Maria MD, 1,000 mcg at 03/09/23 0831     PAST MEDICAL HISTORY    PAST MEDICAL HISTORY:   Past Medical History:   Diagnosis Date    Allergic sinusitis     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    BPH (benign prostatic hyperplasia)     Burning sensation of feet     Cancer (H)     basal cell skin    Chronic kidney disease     Stage 3    Chronic laryngitis     Chronic neck pain     GERD (gastroesophageal reflux disease)     Hiatal hernia     Hypertension 03/23/2021    Lumbar spinal stenosis     Neuropathic pain     Neuropathy     Obesity     Osteoarthritis     Pancreatic cyst     Pancreatitis, recurrent     Peripheral neuropathy     Pernicious anemia     Restless leg syndrome     SCC (squamous cell carcinoma), face     Cheek    Seasonal allergic rhinitis     Spinal stenosis, lumbar region, with neurogenic claudication        PAST SURGICAL HISTORY    PAST SURGICAL HISTORY:   Past Surgical History:   Procedure Laterality Date    ARTHROSCOPY KNEE Bilateral     CATARACT EXTRACTION Bilateral     CHOLECYSTECTOMY  01/01/1995    ESOPHAGOSCOPY, GASTROSCOPY, DUODENOSCOPY (EGD), COMBINED N/A 12/21/2018    Procedure: ENDOSCOPIC ULTRASOUND FINE NEEDLE ASPIRATION;  Surgeon: Fidel Salter MD;  [place] [facility] [place]    ESOPHAGOSCOPY, GASTROSCOPY, DUODENOSCOPY (EGD), COMBINED N/A 11/15/2019    Procedure: ENDOSCOPIC ULTRASOUND WITH SMALL BOWEL AND GASTRIC BIOPSY;  Surgeon: Fidel Salter MD;  [place] [facility] [place]     ESOPHAGOSCOPY, GASTROSCOPY, DUODENOSCOPY (EGD), COMBINED N/A 11/10/2023    Procedure: ENDOSCOPIC ULTRASOUND, UPPER TRACT;  Surgeon: Fidel Salter MD;  [place] [facility] [place]    JOINT REPLACEMENT Right     TKA    ORTHOPEDIC SURGERY      RELEASE CARPAL TUNNEL Bilateral     RELEASE TRIGGER FINGER Bilateral        FAMILY  HISTORY    FAMILY HISTORY:   Family History   Problem Relation Age of Onset    Coronary Artery Disease Father     Pancreatic Cancer Mother     Breast Cancer Sister     Liver Disease Brother     Other - See Comments Sister         head bleed after fall    Other - See Comments Brother     Neuropathy Brother     Diabetes Brother     Diabetes Brother     Other - See Comments Brother         WWII    No Known Problems Son        SOCIAL HISTORY    SOCIAL HISTORY:   Social History     Tobacco Use    Smoking status: Never     Passive exposure: Never    Smokeless tobacco: Never   Substance Use Topics    Alcohol use: Not Currently     Comment: last consumed >1 year ago        PHYSICAL EXAM    HEAD: Normal appearance and symmetry:  No cutaneous lesions.      NECK:  supple     EARS: Ears - The tympanic membranes are normal in appearance, bony landmarks are intact.  No retraction, perforation, or masses. .  No fluid or purulence was seen in the external canal or the middle ear. No evidence of infection of the middle ear or external canal, cerumen was normal in appearance.     + mild TTP just anterior to left tragus, jaw MELANIA, no crepitus    EYES:  EOMI    CN VII/XII:  intact     NOSE:     Dorsum:   straight  Septum:  midline  Mucosa:  moist        ORAL CAVITY/OROPHARYNX:     Lips:  Normal.  Tongue: normal, midline  Mucosa:   no lesions     NECK:  Trachea:  midline.              Thyroid:  normal              Adenopathy:  none        NEURO:   Alert and Oriented     GAIT AND STATION:  normal     RESPIRATORY:   Symmetry and Respiratory effort     PSYCH:  Normal mood and affect     SKIN:   warm and dry          IMPRESSION:    Encounter Diagnoses   Name Primary?    TMJ (temporomandibular joint syndrome) Yes    Otalgia, left     Sensorineural hearing loss (SNHL) of both ears           RECOMMENDATIONS:    Annual audiogram. Cleared for hearing aids.    Ispine referral for TMJ p/t.        No orders of the defined types were placed in this encounter.     [unfilled]

## 2024-03-08 NOTE — PROGRESS NOTES
AUDIOLOGY REPORT     SUMMARY: Audiology visit completed. See audiogram for results.       RECOMMENDATIONS: Follow-up with ENT.    aTlib Teague, CCC-A  [place] Licensed Audiologist #[id number]

## 2024-04-04 ENCOUNTER — ALLIED HEALTH/NURSE VISIT (OUTPATIENT)
Dept: FAMILY MEDICINE | Facility: CLINIC | Age: 79
End: 2024-04-04
Payer: MEDICARE

## 2024-04-04 DIAGNOSIS — E53.8 VITAMIN B12 DEFICIENCY (NON ANEMIC): Primary | ICD-10-CM

## 2024-04-04 PROCEDURE — 96372 THER/PROPH/DIAG INJ SC/IM: CPT | Performed by: INTERNAL MEDICINE

## 2024-04-04 PROCEDURE — 99207 PR NO CHARGE NURSE ONLY: CPT

## 2024-04-04 RX ADMIN — CYANOCOBALAMIN 1000 MCG: 1000 INJECTION, SOLUTION INTRAMUSCULAR; SUBCUTANEOUS at 07:56

## 2024-04-18 ENCOUNTER — TRANSFERRED RECORDS (OUTPATIENT)
Dept: HEALTH INFORMATION MANAGEMENT | Facility: CLINIC | Age: 79
End: 2024-04-18
Payer: MEDICARE

## 2024-05-02 ENCOUNTER — ALLIED HEALTH/NURSE VISIT (OUTPATIENT)
Dept: FAMILY MEDICINE | Facility: CLINIC | Age: 79
End: 2024-05-02
Payer: MEDICARE

## 2024-05-02 DIAGNOSIS — E53.8 VITAMIN B12 DEFICIENCY (NON ANEMIC): Primary | ICD-10-CM

## 2024-05-02 PROCEDURE — 96372 THER/PROPH/DIAG INJ SC/IM: CPT | Performed by: INTERNAL MEDICINE

## 2024-05-02 PROCEDURE — 99207 PR NO CHARGE NURSE ONLY: CPT

## 2024-05-02 RX ADMIN — CYANOCOBALAMIN 1000 MCG: 1000 INJECTION, SOLUTION INTRAMUSCULAR; SUBCUTANEOUS at 08:05

## 2024-05-25 DIAGNOSIS — G62.9 NEUROPATHY: ICD-10-CM

## 2024-05-28 RX ORDER — GABAPENTIN 100 MG/1
CAPSULE ORAL
Qty: 540 CAPSULE | Refills: 0 | Status: SHIPPED | OUTPATIENT
Start: 2024-05-28 | End: 2024-08-06

## 2024-05-28 NOTE — TELEPHONE ENCOUNTER
Refill request for: Gabapentin     LOV: 7/25/23  NOV: 8/6/24    90 day supply with 0 refills Medication T'd for review and signature

## 2024-05-30 ENCOUNTER — ALLIED HEALTH/NURSE VISIT (OUTPATIENT)
Dept: FAMILY MEDICINE | Facility: CLINIC | Age: 79
End: 2024-05-30
Payer: MEDICARE

## 2024-05-30 DIAGNOSIS — E53.8 VITAMIN B12 DEFICIENCY (NON ANEMIC): Primary | ICD-10-CM

## 2024-05-30 PROCEDURE — 96372 THER/PROPH/DIAG INJ SC/IM: CPT | Performed by: INTERNAL MEDICINE

## 2024-05-30 PROCEDURE — 99207 PR NO CHARGE NURSE ONLY: CPT

## 2024-05-30 RX ADMIN — CYANOCOBALAMIN 1000 MCG: 1000 INJECTION, SOLUTION INTRAMUSCULAR; SUBCUTANEOUS at 08:02

## 2024-06-27 ENCOUNTER — ALLIED HEALTH/NURSE VISIT (OUTPATIENT)
Dept: FAMILY MEDICINE | Facility: CLINIC | Age: 79
End: 2024-06-27
Payer: MEDICARE

## 2024-06-27 DIAGNOSIS — G25.81 RESTLESS LEGS SYNDROME: Primary | ICD-10-CM

## 2024-06-27 PROCEDURE — 99207 PR NO CHARGE NURSE ONLY: CPT

## 2024-06-27 PROCEDURE — 96372 THER/PROPH/DIAG INJ SC/IM: CPT | Performed by: INTERNAL MEDICINE

## 2024-06-27 RX ADMIN — CYANOCOBALAMIN 1000 MCG: 1000 INJECTION, SOLUTION INTRAMUSCULAR; SUBCUTANEOUS at 08:01

## 2024-07-14 ENCOUNTER — HEALTH MAINTENANCE LETTER (OUTPATIENT)
Age: 79
End: 2024-07-14

## 2024-07-18 ENCOUNTER — TRANSFERRED RECORDS (OUTPATIENT)
Dept: HEALTH INFORMATION MANAGEMENT | Facility: CLINIC | Age: 79
End: 2024-07-18
Payer: MEDICARE

## 2024-07-23 ENCOUNTER — OFFICE VISIT (OUTPATIENT)
Dept: INTERNAL MEDICINE | Facility: CLINIC | Age: 79
End: 2024-07-23
Payer: MEDICARE

## 2024-07-23 VITALS
TEMPERATURE: 98.2 F | WEIGHT: 191 LBS | RESPIRATION RATE: 18 BRPM | DIASTOLIC BLOOD PRESSURE: 70 MMHG | SYSTOLIC BLOOD PRESSURE: 130 MMHG | BODY MASS INDEX: 31.82 KG/M2 | HEIGHT: 65 IN | HEART RATE: 64 BPM | OXYGEN SATURATION: 97 %

## 2024-07-23 DIAGNOSIS — R73.03 PREDIABETES: ICD-10-CM

## 2024-07-23 DIAGNOSIS — I10 ESSENTIAL (PRIMARY) HYPERTENSION: ICD-10-CM

## 2024-07-23 DIAGNOSIS — N18.31 STAGE 3A CHRONIC KIDNEY DISEASE (H): Primary | ICD-10-CM

## 2024-07-23 DIAGNOSIS — E53.8 VITAMIN B12 DEFICIENCY (NON ANEMIC): ICD-10-CM

## 2024-07-23 LAB
ANION GAP SERPL CALCULATED.3IONS-SCNC: 13 MMOL/L (ref 7–15)
BUN SERPL-MCNC: 18.9 MG/DL (ref 8–23)
CALCIUM SERPL-MCNC: 9.2 MG/DL (ref 8.8–10.4)
CHLORIDE SERPL-SCNC: 102 MMOL/L (ref 98–107)
CREAT SERPL-MCNC: 1.3 MG/DL (ref 0.67–1.17)
EGFRCR SERPLBLD CKD-EPI 2021: 56 ML/MIN/1.73M2
ERYTHROCYTE [DISTWIDTH] IN BLOOD BY AUTOMATED COUNT: 12.7 % (ref 10–15)
GLUCOSE SERPL-MCNC: 97 MG/DL (ref 70–99)
HBA1C MFR BLD: 6.1 % (ref 0–5.6)
HCO3 SERPL-SCNC: 23 MMOL/L (ref 22–29)
HCT VFR BLD AUTO: 42.2 % (ref 40–53)
HGB BLD-MCNC: 14.3 G/DL (ref 13.3–17.7)
MCH RBC QN AUTO: 32.5 PG (ref 26.5–33)
MCHC RBC AUTO-ENTMCNC: 33.9 G/DL (ref 31.5–36.5)
MCV RBC AUTO: 96 FL (ref 78–100)
PLATELET # BLD AUTO: 144 10E3/UL (ref 150–450)
POTASSIUM SERPL-SCNC: 4.2 MMOL/L (ref 3.4–5.3)
RBC # BLD AUTO: 4.4 10E6/UL (ref 4.4–5.9)
SODIUM SERPL-SCNC: 138 MMOL/L (ref 135–145)
WBC # BLD AUTO: 6.3 10E3/UL (ref 4–11)

## 2024-07-23 PROCEDURE — 99214 OFFICE O/P EST MOD 30 MIN: CPT | Performed by: INTERNAL MEDICINE

## 2024-07-23 PROCEDURE — 83036 HEMOGLOBIN GLYCOSYLATED A1C: CPT | Performed by: INTERNAL MEDICINE

## 2024-07-23 PROCEDURE — 85027 COMPLETE CBC AUTOMATED: CPT | Performed by: INTERNAL MEDICINE

## 2024-07-23 PROCEDURE — G2211 COMPLEX E/M VISIT ADD ON: HCPCS | Performed by: INTERNAL MEDICINE

## 2024-07-23 PROCEDURE — 36415 COLL VENOUS BLD VENIPUNCTURE: CPT | Performed by: INTERNAL MEDICINE

## 2024-07-23 PROCEDURE — 80048 BASIC METABOLIC PNL TOTAL CA: CPT | Performed by: INTERNAL MEDICINE

## 2024-07-23 RX ORDER — LOSARTAN POTASSIUM AND HYDROCHLOROTHIAZIDE 12.5; 5 MG/1; MG/1
1 TABLET ORAL DAILY
Qty: 90 TABLET | Refills: 3 | Status: SHIPPED | OUTPATIENT
Start: 2024-07-23

## 2024-07-23 RX ORDER — PENICILLIN V POTASSIUM 500 MG/1
500 TABLET, FILM COATED ORAL 4 TIMES DAILY
COMMUNITY

## 2024-07-23 NOTE — PROGRESS NOTES
Office Visit - Follow Up   Mickey Desouza   79 year old male    Date of Visit: 7/23/2024    Chief Complaint   Patient presents with    Follow Up     General medication review        -------------------------------------------------------------------------------------------------------------------------  Assessment and Plan    Follow-up, midyear check-in, doing great since our last visit of January 2024    To monitor kidney function, pernicious anemia (on B12 replacement), and prediabetes, will check Mickey is basic metabolic panel, blood cell counts, and hemoglobin A1c.     79-year-old man, retired  and     Gingivitis surrounding left upper molar (currently crowned).  Mickey his dentist put him on penicillin, which I told Mickey is excellent coverage for the oral bacteria that cause dental abscesses.  Mickey told me that this course of penicillin is supposed to run for 10 days.    Pancreatic cyst  Endoscopic ultrasound for surveillance 11-  Impression:  - A cystic lesion was seen in the pancreatic body.                          Tissue has not been obtained. However, the endosonographic appearance is highly suspicious for a  branched intraductal papillary mucinous neoplasm.                          - Normal examined esophagus.   Recommendation:                          - Repeat the upper endoscopic ultrasound in 2 years                          for surveillance.      surveillance upper endoscopy ultrasound on November 11, 2021,  Dr. Fidel Salter     History of recurrent bouts of pancreatitis 1989, 1990, 96, 2008, with a 1.1 x 1.9 cm pancreatic cyst most recently imaged by CT October 19, 2018, stable in size.  He told me that alcohol consumption is 0.     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  Consider possibility of lactose intolerance.     Chronic laryngitis/globus sensation from his known acid reflux and hiatal hernia  He had a helpful consultation with otolaryngology Dr. Bradley March 9, 2022 because of chronic throat pain  Dr. Bradley's observed chronic laryngitis on fiberoptic laryngoscopy  I also reminded him of the importance of not lying down within 2 hours of eating      Peripheral neuropathy with numbness in his feet, also restless leg syndrome, has been working with neurologist Dr. Adame, and is currently on gabapentin 100 mg capsules  300 mg at 7:00 PM, 200 mg at 9:00 PM and 200 mg at 10:00 PM, Disp-630 capsule     He has undergone EMG which demonstrated neuropathy.  Laboratory work did not identify an underlying cause.  He experiences pins-and-needles dysesthesias.  Ferritin level was normal.  Past trial of Cymbalta did not produce much relief.      Trial of topiramate was unsuccessful because of intolerable side effects of sleepiness, and therefore discontinued yesterday October 25, 2021      Essential hypertension, blood pressure nicely controlled on combination of losartan 50 mg a day with hydrochlorothiazide 12.5 mg a day.  Goal blood pressure is 120/80.     BP Readings from Last 6 Encounters:   07/23/24 130/70   01/19/24 120/68   11/10/23 139/70   10/31/23 110/72   07/25/23 121/74   04/27/23 124/70     lipids good, no medicine November 2020  Cholesterol      <=199 mg/dL   157   Triglycerides    <=149 mg/dL   133   Direct Measure HDL   >=40 mg/dL     44   LDL Cholesterol Calculated    <=129 mg/dL   86      Chronic kidney disease stage 3A with GFR of 53  1-  Creatinine  0.67 - 1.17 mg/dL 1.43     GFR Estimate  >60 mL/min/1.73m2 50 Low      Benign prostatic hyperplasia with urinary frequency, Flomax was ineffective, PSA was quite satisfactory only 1.0 measured  November 20, 2020.  He told me that he empties his bladder completely, and that is the most important thing.     Prediabetes in the context of obesity  1-  Hemoglobin A1C  0.0 - 5.6 % 6.0 High      History of pernicious anemia taking vitamin B12 injections monthly     Obesity with body mass index of 31.8 I would like to see him lose 20 pounds this year.  I reminded him about the importance of eating slower, controlling portion size, and identifying problem foods to curtail or eliminate, especially starches, sweets, fried foods.     Wt Readings from Last 5 Encounters:   07/23/24 86.6 kg (191 lb)   01/19/24 88 kg (194 lb)   11/10/23 87.3 kg (192 lb 8 oz)   10/31/23 87.1 kg (192 lb)   07/25/23 87.8 kg (193 lb 8 oz)     Lumbar spinal stenosis, for which has been getting epidural steroid injections approximately twice a year.    He recalls last epidural steroid injection was in June 2021, which gave him relief for a couple months, but he is hurting again.     He is working with a chiropractor twice a week  But I think he might benefit from some additional physical therapy.  He would like to go to [facility] in [place], slight entered external referral.     He told me that he might use 1 or 2 tramadol tablets a week for his back.  I reminded him to minimize the use of the tramadol, because it can contribute to constipation.  I will renew his prescription today for 30 tablets.    Left-sided triceps pain, which Mickey told me has been present for years, comes and goes, and tends to flareup after physical activity, for example raking the lawn.   He goes to [facility] in [place] to get aquatic therapy    Chronic neck pain.  Similar to his back, he needs to work on strengthening her upper back muscles also deep neck muscles, sleep on a properly supportive pillow, and maintain mobility and flexibility.      Status post right total knee arthroplasty, initially 2004, redo in 2009, now doing  well.     Status post cataract surgery both eyes 2018, dry eye syndrome recently prescribed Restasis drops    Left otitis media, January 15, 2024  Responded nicely to amoxicillin 875 mg twice a day for 5 days     Constipation, he told me that he might go 2 to 3 days without a bowel movement.  I reminded him to minimize the tramadol opioid.  He asked about Linzess, but I think he needs to first go with the first line management which would be MiraLAX (polyethylene glycol powder).  I told him that he could take 1 capful of MiraLAX powder a day, diluted into his favorite beverage.     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     Vaccinated and boosted X1 for COVID, third shot October 26, 2021  Mickey opts out of flu vaccine and COVID-19 booster.    --------------------------------------------------------------------------------------------------------------------------  History of Present Illness  This 79 year old old     Follow-up, midyear check-in, doing great since our last visit of January 2024    To monitor kidney function, pernicious anemia (on B12 replacement), and prediabetes, will check Mickey is basic metabolic panel, blood cell counts, and hemoglobin A1c.    Wt Readings from Last 3 Encounters:   07/23/24 86.6 kg (191 lb)   01/19/24 88 kg (194 lb)   11/10/23 87.3 kg (192 lb 8 oz)     BP Readings from Last 3 Encounters:   07/23/24 130/70   01/19/24 120/68   11/10/23 139/70       ---------------------------------------------------------------------------------------------------------------------------    Medications, Allergies, Social, and Problem List       Current Outpatient  "Medications   Medication Sig Dispense Refill    aspirin (ASA) 81 MG chewable tablet Take 81 mg by mouth as needed for pain      Cyanocobalamin 1000 MCG/ML KIT Inject 1,000 mcg as directed every 30 days      gabapentin (NEURONTIN) 100 MG capsule 200 MG AT 4:00 PM, 200 MG AT 7:00 PM  MG AT 9:00  capsule 0    glucosamine-chondroitin 500-400 MG CAPS per capsule   Instructions: Take 1 capsule by mouth 2 (two) times a day., Type: Maintenance      losartan-hydrochlorothiazide (HYZAAR) 50-12.5 MG tablet Take 1 tablet by mouth daily 90 tablet 3    Multiple Vitamins-Minerals (DAILY MULTIVITAMIN PO)   Instructions: Take 1 tablet by mouth daily., Type: Maintenance      omeprazole (PRILOSEC) 40 MG DR capsule Take 40 mg by mouth daily      penicillin V (VEETID) 500 MG tablet Take 500 mg by mouth 4 times daily For 10 days for tooth infection       Allergies   Allergen Reactions    Ciprofloxacin Unknown    Codeine Other (See Comments)     Abd pain    Lisinopril Cough    Morphine Other (See Comments)     Abd pain    Sulfamethizole Nausea and Vomiting     Social History     Tobacco Use    Smoking status: Never     Passive exposure: Never    Smokeless tobacco: Never   Vaping Use    Vaping status: Never Used   Substance Use Topics    Alcohol use: Not Currently     Comment: last consumed >1 year ago    Drug use: No     Patient Active Problem List   Diagnosis    Urinary frequency    Restless legs syndrome    Neuropathy    Neuropathic pain    Neck pain, chronic    Hypertension    Allergic rhinitis    Burning sensation of feet    Gastroesophageal reflux disease    Obesity    Pancreatic cyst    Spinal stenosis, lumbar region, with neurogenic claudication        Reviewed, reconciled and updated       Physical Exam   General Appearance:       /70 (BP Location: Right arm, Patient Position: Sitting, Cuff Size: Adult Regular)   Pulse 64   Temp 98.2  F (36.8  C) (Oral)   Resp 18   Ht 1.651 m (5' 5\")   Wt 86.6 kg (191 lb)  "  SpO2 97%   BMI 31.78 kg/m      General: Alert, in no distress  Pulm: Lungs clear to auscultation bilaterally  Cardiac: Heart with regular rate and rhythm, no murmur or gallop  No edema         Additional Information   I spent 20 minutes on this encounter, including reviewing interval history since last visit, examining the patient, explaining and counseling the issues enumerated in the Assessment and Plan (patient given a copy), ordering indicated tests    The longitudinal plan of care for the diagnosis(es)/condition(s) as documented were addressed during this visit. Due to the added complexity in care, I will continue to support Mickey in the subsequent management and with ongoing continuity of care.       BORA AC MD, MD    Signed Electronically by: BORA AC MD

## 2024-07-23 NOTE — PATIENT INSTRUCTIONS
Follow-up, midyear check-in, doing great since our last visit of January 2024    To monitor kidney function, pernicious anemia (on B12 replacement), and prediabetes, will check Mickey is basic metabolic panel, blood cell counts, and hemoglobin A1c.     79-year-old man, retired  and     Gingivitis surrounding left upper molar (currently crowned).  Mickey his dentist put him on penicillin, which I told Mickey is excellent coverage for the oral bacteria that cause dental abscesses.  Mickey told me that this course of penicillin is supposed to run for 10 days.    Pancreatic cyst  Endoscopic ultrasound for surveillance 11-  Impression:  - A cystic lesion was seen in the pancreatic body.                          Tissue has not been obtained. However, the endosonographic appearance is highly suspicious for a  branched intraductal papillary mucinous neoplasm.                          - Normal examined esophagus.   Recommendation:                          - Repeat the upper endoscopic ultrasound in 2 years                          for surveillance.      surveillance upper endoscopy ultrasound on November 11, 2021,  Dr. Fidel Salter     History of recurrent bouts of pancreatitis 1989, 1990, 96, 2008, with a 1.1 x 1.9 cm pancreatic cyst most recently imaged by CT October 19, 2018, stable in size.  He told me that alcohol consumption is 0.     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  Consider possibility of lactose intolerance.     Chronic laryngitis/globus sensation from his known acid reflux and hiatal hernia  He had a helpful consultation with otolaryngology Dr. Bradley March 9, 2022 because  of chronic throat pain  Dr. Bradley's observed chronic laryngitis on fiberoptic laryngoscopy  I also reminded him of the importance of not lying down within 2 hours of eating      Peripheral neuropathy with numbness in his feet, also restless leg syndrome, has been working with neurologist Dr. Adame, and is currently on gabapentin 100 mg capsules  300 mg at 7:00 PM, 200 mg at 9:00 PM and 200 mg at 10:00 PM, Disp-630 capsule     He has undergone EMG which demonstrated neuropathy.  Laboratory work did not identify an underlying cause.  He experiences pins-and-needles dysesthesias.  Ferritin level was normal.  Past trial of Cymbalta did not produce much relief.      Trial of topiramate was unsuccessful because of intolerable side effects of sleepiness, and therefore discontinued yesterday October 25, 2021      Essential hypertension, blood pressure nicely controlled on combination of losartan 50 mg a day with hydrochlorothiazide 12.5 mg a day.  Goal blood pressure is 120/80.     BP Readings from Last 6 Encounters:   07/23/24 130/70   01/19/24 120/68   11/10/23 139/70   10/31/23 110/72   07/25/23 121/74   04/27/23 124/70     lipids good, no medicine November 2020  Cholesterol      <=199 mg/dL   157   Triglycerides    <=149 mg/dL   133   Direct Measure HDL   >=40 mg/dL     44   LDL Cholesterol Calculated    <=129 mg/dL   86      Chronic kidney disease stage 3A with GFR of 53  1-  Creatinine  0.67 - 1.17 mg/dL 1.43     GFR Estimate  >60 mL/min/1.73m2 50 Low      Benign prostatic hyperplasia with urinary frequency, Flomax was ineffective, PSA was quite satisfactory only 1.0 measured November 20, 2020.  He told me that he empties his bladder completely, and that is the most important thing.     Prediabetes in the context of obesity  1-  Hemoglobin A1C  0.0 - 5.6 % 6.0 High      History of pernicious anemia taking vitamin B12 injections monthly     Obesity with body mass index of 31.8 I would like to see him  lose 20 pounds this year.  I reminded him about the importance of eating slower, controlling portion size, and identifying problem foods to curtail or eliminate, especially starches, sweets, fried foods.     Wt Readings from Last 5 Encounters:   07/23/24 86.6 kg (191 lb)   01/19/24 88 kg (194 lb)   11/10/23 87.3 kg (192 lb 8 oz)   10/31/23 87.1 kg (192 lb)   07/25/23 87.8 kg (193 lb 8 oz)     Lumbar spinal stenosis, for which has been getting epidural steroid injections approximately twice a year.    He recalls last epidural steroid injection was in June 2021, which gave him relief for a couple months, but he is hurting again.     He is working with a chiropractor twice a week  But I think he might benefit from some additional physical therapy.  He would like to go to [facility] in [place], slight entered external referral.     He told me that he might use 1 or 2 tramadol tablets a week for his back.  I reminded him to minimize the use of the tramadol, because it can contribute to constipation.  I will renew his prescription today for 30 tablets.    Left-sided triceps pain, which Mickey told me has been present for years, comes and goes, and tends to flareup after physical activity, for example raking the lawn.   He goes to [facility] in [place] to get aquatic therapy    Chronic neck pain.  Similar to his back, he needs to work on strengthening her upper back muscles also deep neck muscles, sleep on a properly supportive pillow, and maintain mobility and flexibility.      Status post right total knee arthroplasty, initially 2004, redo in 2009, now doing well.     Status post cataract surgery both eyes 2018, dry eye syndrome recently prescribed Restasis drops    Left otitis media, January 15, 2024  Responded nicely to amoxicillin 875 mg twice a day for 5 days     Constipation, he told me that he might go 2 to 3 days without a bowel movement.  I reminded him to minimize the tramadol opioid.   He asked about Linzess, but I think he needs to first go with the first line management which would be MiraLAX (polyethylene glycol powder).  I told him that he could take 1 capful of MiraLAX powder a day, diluted into his favorite beverage.     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     Vaccinated and boosted X1 for COVID, third shot October 26, 2021  Mickey opts out of flu vaccine and COVID-19 booster.

## 2024-07-24 ENCOUNTER — TRANSFERRED RECORDS (OUTPATIENT)
Dept: HEALTH INFORMATION MANAGEMENT | Facility: CLINIC | Age: 79
End: 2024-07-24
Payer: MEDICARE

## 2024-07-25 ENCOUNTER — ALLIED HEALTH/NURSE VISIT (OUTPATIENT)
Dept: FAMILY MEDICINE | Facility: CLINIC | Age: 79
End: 2024-07-25
Payer: MEDICARE

## 2024-07-25 DIAGNOSIS — E53.8 VITAMIN B12 DEFICIENCY (NON ANEMIC): Primary | ICD-10-CM

## 2024-07-25 PROCEDURE — 96372 THER/PROPH/DIAG INJ SC/IM: CPT | Performed by: INTERNAL MEDICINE

## 2024-07-25 PROCEDURE — 99207 PR NO CHARGE NURSE ONLY: CPT

## 2024-07-25 RX ADMIN — CYANOCOBALAMIN 1000 MCG: 1000 INJECTION, SOLUTION INTRAMUSCULAR; SUBCUTANEOUS at 07:58

## 2024-08-06 ENCOUNTER — LAB (OUTPATIENT)
Dept: LAB | Facility: HOSPITAL | Age: 79
End: 2024-08-06
Payer: MEDICARE

## 2024-08-06 ENCOUNTER — OFFICE VISIT (OUTPATIENT)
Dept: NEUROLOGY | Facility: CLINIC | Age: 79
End: 2024-08-06
Payer: MEDICARE

## 2024-08-06 VITALS
BODY MASS INDEX: 31.63 KG/M2 | HEART RATE: 67 BPM | WEIGHT: 190.1 LBS | SYSTOLIC BLOOD PRESSURE: 135 MMHG | DIASTOLIC BLOOD PRESSURE: 70 MMHG

## 2024-08-06 DIAGNOSIS — R73.03 PREDIABETES: ICD-10-CM

## 2024-08-06 DIAGNOSIS — G62.9 NEUROPATHY: Primary | ICD-10-CM

## 2024-08-06 DIAGNOSIS — R79.0 LOW FERRITIN: ICD-10-CM

## 2024-08-06 DIAGNOSIS — E61.1 IRON DEFICIENCY ASSOCIATED WITH FAMILIAL RESTLESS LEGS SYNDROME: ICD-10-CM

## 2024-08-06 DIAGNOSIS — G25.81 RESTLESS LEG SYNDROME: ICD-10-CM

## 2024-08-06 DIAGNOSIS — M79.2 NEUROPATHIC PAIN: ICD-10-CM

## 2024-08-06 DIAGNOSIS — G25.81 IRON DEFICIENCY ASSOCIATED WITH FAMILIAL RESTLESS LEGS SYNDROME: ICD-10-CM

## 2024-08-06 LAB — FERRITIN SERPL-MCNC: 108 NG/ML (ref 31–409)

## 2024-08-06 PROCEDURE — 99214 OFFICE O/P EST MOD 30 MIN: CPT | Performed by: PSYCHIATRY & NEUROLOGY

## 2024-08-06 PROCEDURE — 36415 COLL VENOUS BLD VENIPUNCTURE: CPT

## 2024-08-06 PROCEDURE — G2211 COMPLEX E/M VISIT ADD ON: HCPCS | Performed by: PSYCHIATRY & NEUROLOGY

## 2024-08-06 PROCEDURE — 82728 ASSAY OF FERRITIN: CPT

## 2024-08-06 RX ORDER — GABAPENTIN 100 MG/1
CAPSULE ORAL
Qty: 540 CAPSULE | Refills: 0 | Status: SHIPPED | OUTPATIENT
Start: 2024-08-06 | End: 2024-09-11

## 2024-08-06 NOTE — PROGRESS NOTES
NEUROLOGY OUTPATIENT PROGRESS NOTE  Aug 6, 2024     CHIEF COMPLAINT/REASON FOR VISIT/REASON FOR CONSULT  Patient presents with:  NEUROPATHY: A little bit worse in the R ankle; pain here and there.   Refill request for Gabapentin    REASON FOR CONSULTATION- Neuropathy/Neuropathic pain    HISTORY OF PRESENT ILLNESS  Mickey Desouza is a 79 year old male seen for  numbness in the feet and neuropathic pain. EMG had shown a neuropathy. Blood work was negative for causes of neuropathy. He has pins-and-needles and pain for which he is using gabapentin which she does find benefit. He also reports some sensation to move his legs to get comfortable at nighttime. Ferritin came back normal. In the past we had tried Cymbalta which she only tried for little bit before his primary switched him back to the gabapentin. He has also been prescribed Lyrica in the past but could not afford it because of cost. Pain starts coming on in the evening time. Reports that sometimes the pain wakes him up in the nighttime.    He was supposed to be on gabapentin to 100 mg at 4  mg at 7:30  mg at 10:30  mg at 12:30 PM to prevent the pain from happening early in the evening and later in the morning. He is not taking the 4:00 dose. He reports that the numbness is about the same. Numbness is limited to below the ankles. Pain has been about the same maybe slightly better. Though he does have good days and bad days. Bad days about once a week. Overall things have been working well for him. Pain can be more sharp in the feet. Pain is unchanged in nature. Walking does make the pain worse for him. Denies any other neurological symptoms.    9/30/20  Patient returns today.  He reports that he continues to have numbness in his feet.  Has not progressed.  Continues to have problems with neuropathic pain.  1 day he started having the pain earlier in the daytime.  Otherwise the pain mainly comes in the evening.  He is tried taking the  gabapentin 4 times as previously discussed but that has not really helped.  Currently is taking 300 mg of gabapentin around 7 PM and then 300 mg at 9 PM.  This is the best combination is found.  Denies any side effects to the gabapentin.  Previously was having some neck pain which has improved.  He also has spinal stenosis and has been working with his doctor regarding spinal stenosis.  He further reports issues with knee pain and has been getting some injections for that.  Has been exercising 3 times a week.  This is for 45 minutes.    3/23/21  Patient returns today.  Continues to have numbness in his feet.  This has not progressed.  Continues to have neuropathic pain.  Has reduce the dose of gabapentin 100 mg at 7 PM and 100 mg in 9 PM with no worsening of his symptoms.  Overall pain is not under good control but he is satisfied with where things are.  He did watching television about some electrical stimulation of the legs which can help with nerve damage.  Discussed with him that there is no clear evidence that this will help.  He further reports that he has been exercising.  He is eating healthy.  He is social distancing.  He has had the second Covid shot 3 weeks ago.  No side effects.    1/24/23  Patient returns today.  Was last seen about 2 years ago.  Does complain of worsening numbness in his feet.  Also has worsening neuropathic pain.  Feels that some of his symptoms might be spreading to his hands.  Has occasionally been trying to take 700 mg of gabapentin in in the evening.  Has not not really gained weight though has been diagnosed with prediabetes.  Is eating a lot of sweets.  Exercises about the same.  No other clear cause for nerve damage.  Is getting B12 injections as well.    7/25/23  Patient returns today.  Neuropathy/numbness in the feet/pain has been about the same.  Feels that the restless leg symptoms come on around 6 PM.  He is taking the gabapentin 200 mg at 4 PM, 200 mg at 7 PM and 200 mg  at 9 PM.  Overall this combination is working.  No side effects.  Remains on the cyanocobalamin injections.  Blood work was noncontributory.  Has been exercising and doing pool therapy as well as using the Voltaren.  Does do balance exercises as well.  No other new concerns.    8/6/24  Patient returns today.  Neuropathy/numbness is about the same.  Pain generally comes on around 6 PM.  He did try taking the 4 PM dose though that does make him pretty somnolent in the evening.  He mainly takes 300 mg at 7 PM and 300 mg at 9 PM is at 7 PM dose wears off.  Restless leg is not that bad.  Continues to take the multivitamin with iron.  Remains on the cyanocobalamin injections.  No other new concerns.  Has cut down on the candy and his prediabetes has been stable.  Is exercising about 3 times a week.  Encouraged him to go more for walks to see if that helps.  Does not want to do any creams or any other medications.  No other new concerns    Previous history is reviewed and this is unchanged.    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    BPH (benign prostatic hyperplasia)     Burning sensation of feet     Cancer (H)     basal cell skin    Chronic kidney disease     Stage 3    Chronic laryngitis     Chronic neck pain     GERD (gastroesophageal reflux disease)     Hiatal hernia     Hypertension 03/23/2021    Lumbar spinal stenosis     Neuropathic pain     Neuropathy     Obesity     Osteoarthritis     Pancreatic cyst     Pancreatitis, recurrent     Peripheral neuropathy     Pernicious anemia     Restless leg syndrome     SCC (squamous cell carcinoma), face     Cheek    Seasonal allergic rhinitis     Spinal stenosis, lumbar region, with neurogenic claudication      Patient Active Problem List   Diagnosis    Urinary frequency    Restless legs syndrome    Neuropathy    Neuropathic pain    Neck pain, chronic    Hypertension    Allergic rhinitis     Burning sensation of feet    Gastroesophageal reflux disease    Obesity    Pancreatic cyst    Spinal stenosis, lumbar region, with neurogenic claudication   Hx of carpal tunnel surgery bilaterally      FAMILY HISTORY  Family History   Problem Relation Age of Onset    Coronary Artery Disease Father     Pancreatic Cancer Mother     Breast Cancer Sister     Liver Disease Brother     Other - See Comments Sister         head bleed after fall    Other - See Comments Brother     Neuropathy Brother     Diabetes Brother     Diabetes Brother     Other - See Comments Brother         WWII    No Known Problems Son    Heart attack: Father.      SOCIAL HISTORY  Social History     Tobacco Use    Smoking status: Never     Passive exposure: Never    Smokeless tobacco: Never   Vaping Use    Vaping status: Never Used   Substance Use Topics    Alcohol use: Not Currently     Comment: last consumed >1 year ago    Drug use: No       SYSTEMS REVIEW  Twelve-system ROS was done and other than the HPI this was negative.   No new symptoms/issues.    MEDICATIONS  Current Outpatient Medications   Medication Sig Dispense Refill    aspirin (ASA) 81 MG chewable tablet Take 81 mg by mouth as needed for pain      Cyanocobalamin 1000 MCG/ML KIT Inject 1,000 mcg as directed every 30 days      gabapentin (NEURONTIN) 100 MG capsule 300 mg at 7:00 PM and 300 mg at 9:00  capsule 0    glucosamine-chondroitin 500-400 MG CAPS per capsule   Instructions: Take 1 capsule by mouth 2 (two) times a day., Type: Maintenance      losartan-hydrochlorothiazide (HYZAAR) 50-12.5 MG tablet Take 1 tablet by mouth daily 90 tablet 3    Multiple Vitamins-Minerals (DAILY MULTIVITAMIN PO)   Instructions: Take 1 tablet by mouth daily., Type: Maintenance      omeprazole (PRILOSEC) 40 MG DR capsule Take 40 mg by mouth daily      penicillin V (VEETID) 500 MG tablet Take 500 mg by mouth 4 times daily For 10 days for tooth infection (Patient not taking: Reported on 8/6/2024)        Current Facility-Administered Medications   Medication Dose Route Frequency Provider Last Rate Last Admin    cyanocobalamin injection 1,000 mcg  1,000 mcg Intramuscular Q30 Days Be Maria MD   1,000 mcg at 05/30/24 0802    cyanocobalamin injection 1,000 mcg  1,000 mcg Intramuscular Q30 Days Be Maria MD   1,000 mcg at 07/25/24 0758        PHYSICAL EXAMINATION  VITALS: /70   Pulse 67   Wt 86.2 kg (190 lb 1.6 oz)   BMI 31.63 kg/m    GENERAL: Healthy appearing, alert, no acute distress, normal habitus.   NEUROLOGICAL: Patient is awake and oriented to self, place and time. Attention span is normal. Memory is intact. Language is fluent and follows commands appropriately. Appropriate fund of knowledge. Cranial nerves 2-12 are intact. There is no pronator drift. Motor exam shows 5/5 strength in all extremities tested proximally. Tone is symmetric bilaterally in upper and lower extremities. Reflexes are symmetric and 2+ in upper extremities and absent in lower extremities. Sensory exam shows decreased pin prick to mid shin level/above ankle level.  Decreased vibration up to the knees.  Finger to nose and heel to shin is without dysmetria. Romberg is negative. Gait is normal. Patient is unsteady with walking tandem.  Exam stable.  Reflexes absent.  Pinprick decreased to the ankle region slightly above than ankle.  Vibratory sense intact in the ankles.    DIAGNOSTICS  EMG  CLINICAL INTERPRETATION:  This is a abnormal nerve conduction and EMG study of both lower extremities. The study is consistent with a peripheral neuropathy. Further clinical correlation is needed.     RELEVANT LABS  Ferritin 72  Glucose Level: 105 (12/11/18 07:25:00)   Glucose Fasting: Yes (12/11/18 07:25:00)   TSH Cascade: 1.79 (12/11/18 07:25:00)   Lyme Ab: 0.05 (12/11/18 07:25:00)   SPEP = normal  B12 = normal    Component      Latest Ref Rng & Units 4/26/2022 10/27/2022   TSH      0.30 - 5.00 uIU/mL 1.61    Hemoglobin  A1C      0.0 - 5.6 %  6.1 (H)     Component      Latest Ref Rng 1/24/2023  9:48 AM   Albumin Fraction      3.7 - 5.1 g/dL 4.4    Alpha 1 Fraction      0.2 - 0.4 g/dL 0.3    Alpha 2 Fraction      0.5 - 0.9 g/dL 0.8    Beta Fraction      0.6 - 1.0 g/dL 1.0    Gamma Fraction      0.7 - 1.6 g/dL 1.1    Monoclonal Peak      <=0.0 g/dL 0.0    ELP Interpretation: Essentially normal electrophoretic pattern. No obvious monoclonal proteins seen. Pathologic significance requires clinical correlation. MELITON Briggs M.D., Ph.D., Pathologist ().    Vitamin B12      232 - 1,245 pg/mL 962    Vitamin B1 Whole Blood Level      70 - 180 nmol/L 110    Immunofixation ELP No monoclonal protein seen on immunofixation. Pathologic significance requires clinical correlation. MELITON Briggs M.D., Ph.D., Pathologist ()    Ferritin      31 - 409 ng/mL 81    Total Protein Serum for ELP      6.4 - 8.3 g/dL 7.6        Component      Latest Ref Rng 7/23/2024  8:42 AM   Sodium      135 - 145 mmol/L 138    Potassium      3.4 - 5.3 mmol/L 4.2    Chloride      98 - 107 mmol/L 102    Carbon Dioxide (CO2)      22 - 29 mmol/L 23    Anion Gap      7 - 15 mmol/L 13    Urea Nitrogen      8.0 - 23.0 mg/dL 18.9    Creatinine      0.67 - 1.17 mg/dL 1.30 (H)    GFR Estimate      >60 mL/min/1.73m2 56 (L)    Calcium      8.8 - 10.4 mg/dL 9.2    Glucose      70 - 99 mg/dL 97    WBC      4.0 - 11.0 10e3/uL 6.3    RBC Count      4.40 - 5.90 10e6/uL 4.40    Hemoglobin      13.3 - 17.7 g/dL 14.3    Hematocrit      40.0 - 53.0 % 42.2    MCV      78 - 100 fL 96    MCH      26.5 - 33.0 pg 32.5    MCHC      31.5 - 36.5 g/dL 33.9    RDW      10.0 - 15.0 % 12.7    Platelet Count      150 - 450 10e3/uL 144 (L)    Hemoglobin A1C      0.0 - 5.6 % 6.1 (H)       Legend:  (H) High  (L) Low    IMPRESSION/REPORT/PLAN  Neuropathy  Neuropathic pain  Restless leg syndrome  Prediabetes  Low normal ferritin  Possible TMJ    This is a 79 year old male  idiopathic  neuropathy, neuropathic pain, question of restless leg syndrome.  EMG was confirmatory for neuropathy.  He has now been diagnosed with prediabetes which might be the cause of his neuropathy.  With worsening neuropathy blood work was repeated and this was negative.  Neuropathy now seems to have improved/stable on exam.  Would encourage exercise healthy lifestyle and weight loss if possible.  Encouraged going more for walks to help with balance.  Also recommend keeping the prediabetes under good control.    For neuropathic pain management Cymbalta has been tried in the past.  Gabapentin has been helpful and will further increase the dose.  His pain starts around 6 PM.  He is unable to tolerate the 4 PM dose as he feels very sleepy.  Currently is only taking 300 mg at 7 PM and 300 mg at 9 PM.   He does not want to do Lyrica.    For possible restless leg ferritin was negative.  Unclear diagnosis.  For tenderness low normal would recommend a multivitamin with iron to see if that helps.  Gabapentin should further help with this.  Continue.  Will check a ferritin for 2025.    Has new symptoms of TMJ.  Encouraged not to eat hard food.  Gabapentin should help.    Return in 1 year.    -     gabapentin (NEURONTIN) 100 MG capsule; 300 mg at 7:00 PM and 300 mg at 9:00 PM  -     Ferritin; Future-1 year  - Multivitamin with iron    Over 30 minutes were spent coordinating the care for the patient on the day of the encounter.  This includes previsit, during visit and post visit activities as documented above.  Counseling patient.  Prescription management.  Multiple problems reviewed.  Blood work reviewed.  (Activities include but not inclusive of reviewing chart, reviewing outside records, reviewing labs and imaging study results as well as the images, patient visit time including getting history and exam,  use if applicable, review of test results with the patient and coming up with a plan in a shared model, counseling  patient and family, education and answering patient questions, EMR , EMR diagnosis entry and problem list management, medication reconciliation and prescription management if applicable, paperwork if applicable, printing documents and documentation of the visit activities.)      Kulwinder Adame MD  Neurologist  [facility]  Tel:- [phone]    This note was dictated using voice recognition software.  Any grammatical or context distortions are unintentional and inherent to the software.    The longitudinal plan of care for the diagnosis(es)/condition(s) as documented were addressed during this visit. Due to the added complexity in care, I will continue to support Mickey in the subsequent management and with ongoing continuity of care.

## 2024-08-06 NOTE — LETTER
8/6/2024      Mickey Desouza  [address] [address]      Dear Colleague,    Thank you for referring your patient, Mickey Desouza, to the [facility] [place]. Please see a copy of my visit note below.    NEUROLOGY OUTPATIENT PROGRESS NOTE  Aug 6, 2024     CHIEF COMPLAINT/REASON FOR VISIT/REASON FOR CONSULT  Patient presents with:  NEUROPATHY: A little bit worse in the R ankle; pain here and there.   Refill request for Gabapentin    REASON FOR CONSULTATION- Neuropathy/Neuropathic pain    HISTORY OF PRESENT ILLNESS  Mickey Desouza is a 79 year old male seen for  numbness in the feet and neuropathic pain. EMG had shown a neuropathy. Blood work was negative for causes of neuropathy. He has pins-and-needles and pain for which he is using gabapentin which she does find benefit. He also reports some sensation to move his legs to get comfortable at nighttime. Ferritin came back normal. In the past we had tried Cymbalta which she only tried for little bit before his primary switched him back to the gabapentin. He has also been prescribed Lyrica in the past but could not afford it because of cost. Pain starts coming on in the evening time. Reports that sometimes the pain wakes him up in the nighttime.    He was supposed to be on gabapentin to 100 mg at 4  mg at 7:30  mg at 10:30  mg at 12:30 PM to prevent the pain from happening early in the evening and later in the morning. He is not taking the 4:00 dose. He reports that the numbness is about the same. Numbness is limited to below the ankles. Pain has been about the same maybe slightly better. Though he does have good days and bad days. Bad days about once a week. Overall things have been working well for him. Pain can be more sharp in the feet. Pain is unchanged in nature. Walking does make the pain worse for him. Denies any other neurological symptoms.    9/30/20  Patient returns today.  He reports that he  continues to have numbness in his feet.  Has not progressed.  Continues to have problems with neuropathic pain.  1 day he started having the pain earlier in the daytime.  Otherwise the pain mainly comes in the evening.  He is tried taking the gabapentin 4 times as previously discussed but that has not really helped.  Currently is taking 300 mg of gabapentin around 7 PM and then 300 mg at 9 PM.  This is the best combination is found.  Denies any side effects to the gabapentin.  Previously was having some neck pain which has improved.  He also has spinal stenosis and has been working with his doctor regarding spinal stenosis.  He further reports issues with knee pain and has been getting some injections for that.  Has been exercising 3 times a week.  This is for 45 minutes.    3/23/21  Patient returns today.  Continues to have numbness in his feet.  This has not progressed.  Continues to have neuropathic pain.  Has reduce the dose of gabapentin 100 mg at 7 PM and 100 mg in 9 PM with no worsening of his symptoms.  Overall pain is not under good control but he is satisfied with where things are.  He did watching television about some electrical stimulation of the legs which can help with nerve damage.  Discussed with him that there is no clear evidence that this will help.  He further reports that he has been exercising.  He is eating healthy.  He is social distancing.  He has had the second Covid shot 3 weeks ago.  No side effects.    1/24/23  Patient returns today.  Was last seen about 2 years ago.  Does complain of worsening numbness in his feet.  Also has worsening neuropathic pain.  Feels that some of his symptoms might be spreading to his hands.  Has occasionally been trying to take 700 mg of gabapentin in in the evening.  Has not not really gained weight though has been diagnosed with prediabetes.  Is eating a lot of sweets.  Exercises about the same.  No other clear cause for nerve damage.  Is getting B12  injections as well.    7/25/23  Patient returns today.  Neuropathy/numbness in the feet/pain has been about the same.  Feels that the restless leg symptoms come on around 6 PM.  He is taking the gabapentin 200 mg at 4 PM, 200 mg at 7 PM and 200 mg at 9 PM.  Overall this combination is working.  No side effects.  Remains on the cyanocobalamin injections.  Blood work was noncontributory.  Has been exercising and doing pool therapy as well as using the Voltaren.  Does do balance exercises as well.  No other new concerns.    8/6/24  Patient returns today.  Neuropathy/numbness is about the same.  Pain generally comes on around 6 PM.  He did try taking the 4 PM dose though that does make him pretty somnolent in the evening.  He mainly takes 300 mg at 7 PM and 300 mg at 9 PM is at 7 PM dose wears off.  Restless leg is not that bad.  Continues to take the multivitamin with iron.  Remains on the cyanocobalamin injections.  No other new concerns.  Has cut down on the candy and his prediabetes has been stable.  Is exercising about 3 times a week.  Encouraged him to go more for walks to see if that helps.  Does not want to do any creams or any other medications.  No other new concerns    Previous history is reviewed and this is unchanged.    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     BPH (benign prostatic hyperplasia)      Burning sensation of feet      Cancer (H)     basal cell skin     Chronic kidney disease     Stage 3     Chronic laryngitis      Chronic neck pain      GERD (gastroesophageal reflux disease)      Hiatal hernia      Hypertension 03/23/2021     Lumbar spinal stenosis      Neuropathic pain      Neuropathy      Obesity      Osteoarthritis      Pancreatic cyst      Pancreatitis, recurrent      Peripheral neuropathy      Pernicious anemia      Restless leg syndrome      SCC (squamous cell carcinoma), face     Cheek     Seasonal  allergic rhinitis      Spinal stenosis, lumbar region, with neurogenic claudication      Patient Active Problem List   Diagnosis     Urinary frequency     Restless legs syndrome     Neuropathy     Neuropathic pain     Neck pain, chronic     Hypertension     Allergic rhinitis     Burning sensation of feet     Gastroesophageal reflux disease     Obesity     Pancreatic cyst     Spinal stenosis, lumbar region, with neurogenic claudication   Hx of carpal tunnel surgery bilaterally      FAMILY HISTORY  Family History   Problem Relation Age of Onset     Coronary Artery Disease Father      Pancreatic Cancer Mother      Breast Cancer Sister      Liver Disease Brother      Other - See Comments Sister         head bleed after fall     Other - See Comments Brother      Neuropathy Brother      Diabetes Brother      Diabetes Brother      Other - See Comments Brother         WWII     No Known Problems Son    Heart attack: Father.      SOCIAL HISTORY  Social History     Tobacco Use     Smoking status: Never     Passive exposure: Never     Smokeless tobacco: Never   Vaping Use     Vaping status: Never Used   Substance Use Topics     Alcohol use: Not Currently     Comment: last consumed >1 year ago     Drug use: No       SYSTEMS REVIEW  Twelve-system ROS was done and other than the HPI this was negative.   No new symptoms/issues.    MEDICATIONS  Current Outpatient Medications   Medication Sig Dispense Refill     aspirin (ASA) 81 MG chewable tablet Take 81 mg by mouth as needed for pain       Cyanocobalamin 1000 MCG/ML KIT Inject 1,000 mcg as directed every 30 days       gabapentin (NEURONTIN) 100 MG capsule 300 mg at 7:00 PM and 300 mg at 9:00  capsule 0     glucosamine-chondroitin 500-400 MG CAPS per capsule   Instructions: Take 1 capsule by mouth 2 (two) times a day., Type: Maintenance       losartan-hydrochlorothiazide (HYZAAR) 50-12.5 MG tablet Take 1 tablet by mouth daily 90 tablet 3     Multiple Vitamins-Minerals (DAILY  MULTIVITAMIN PO)   Instructions: Take 1 tablet by mouth daily., Type: Maintenance       omeprazole (PRILOSEC) 40 MG DR capsule Take 40 mg by mouth daily       penicillin V (VEETID) 500 MG tablet Take 500 mg by mouth 4 times daily For 10 days for tooth infection (Patient not taking: Reported on 8/6/2024)       Current Facility-Administered Medications   Medication Dose Route Frequency Provider Last Rate Last Admin     cyanocobalamin injection 1,000 mcg  1,000 mcg Intramuscular Q30 Days Be Maria MD   1,000 mcg at 05/30/24 0802     cyanocobalamin injection 1,000 mcg  1,000 mcg Intramuscular Q30 Days Be Maria MD   1,000 mcg at 07/25/24 0758        PHYSICAL EXAMINATION  VITALS: /70   Pulse 67   Wt 86.2 kg (190 lb 1.6 oz)   BMI 31.63 kg/m    GENERAL: Healthy appearing, alert, no acute distress, normal habitus.   NEUROLOGICAL: Patient is awake and oriented to self, place and time. Attention span is normal. Memory is intact. Language is fluent and follows commands appropriately. Appropriate fund of knowledge. Cranial nerves 2-12 are intact. There is no pronator drift. Motor exam shows 5/5 strength in all extremities tested proximally. Tone is symmetric bilaterally in upper and lower extremities. Reflexes are symmetric and 2+ in upper extremities and absent in lower extremities. Sensory exam shows decreased pin prick to mid shin level/above ankle level.  Decreased vibration up to the knees.  Finger to nose and heel to shin is without dysmetria. Romberg is negative. Gait is normal. Patient is unsteady with walking tandem.  Exam stable.  Reflexes absent.  Pinprick decreased to the ankle region slightly above than ankle.  Vibratory sense intact in the ankles.    DIAGNOSTICS  EMG  CLINICAL INTERPRETATION:  This is a abnormal nerve conduction and EMG study of both lower extremities. The study is consistent with a peripheral neuropathy. Further clinical correlation is needed.     RELEVANT  LABS  Ferritin 72  Glucose Level: 105 (12/11/18 07:25:00)   Glucose Fasting: Yes (12/11/18 07:25:00)   TSH Cascade: 1.79 (12/11/18 07:25:00)   Lyme Ab: 0.05 (12/11/18 07:25:00)   SPEP = normal  B12 = normal    Component      Latest Ref Rng & Units 4/26/2022 10/27/2022   TSH      0.30 - 5.00 uIU/mL 1.61    Hemoglobin A1C      0.0 - 5.6 %  6.1 (H)     Component      Latest Ref Rng 1/24/2023  9:48 AM   Albumin Fraction      3.7 - 5.1 g/dL 4.4    Alpha 1 Fraction      0.2 - 0.4 g/dL 0.3    Alpha 2 Fraction      0.5 - 0.9 g/dL 0.8    Beta Fraction      0.6 - 1.0 g/dL 1.0    Gamma Fraction      0.7 - 1.6 g/dL 1.1    Monoclonal Peak      <=0.0 g/dL 0.0    ELP Interpretation: Essentially normal electrophoretic pattern. No obvious monoclonal proteins seen. Pathologic significance requires clinical correlation. MELITON Briggs M.D., Ph.D., Pathologist ().    Vitamin B12      232 - 1,245 pg/mL 962    Vitamin B1 Whole Blood Level      70 - 180 nmol/L 110    Immunofixation ELP No monoclonal protein seen on immunofixation. Pathologic significance requires clinical correlation. MELITON Briggs M.D., Ph.D., Pathologist ()    Ferritin      31 - 409 ng/mL 81    Total Protein Serum for ELP      6.4 - 8.3 g/dL 7.6        Component      Latest Ref Rng 7/23/2024  8:42 AM   Sodium      135 - 145 mmol/L 138    Potassium      3.4 - 5.3 mmol/L 4.2    Chloride      98 - 107 mmol/L 102    Carbon Dioxide (CO2)      22 - 29 mmol/L 23    Anion Gap      7 - 15 mmol/L 13    Urea Nitrogen      8.0 - 23.0 mg/dL 18.9    Creatinine      0.67 - 1.17 mg/dL 1.30 (H)    GFR Estimate      >60 mL/min/1.73m2 56 (L)    Calcium      8.8 - 10.4 mg/dL 9.2    Glucose      70 - 99 mg/dL 97    WBC      4.0 - 11.0 10e3/uL 6.3    RBC Count      4.40 - 5.90 10e6/uL 4.40    Hemoglobin      13.3 - 17.7 g/dL 14.3    Hematocrit      40.0 - 53.0 % 42.2    MCV      78 - 100 fL 96    MCH      26.5 - 33.0 pg 32.5    MCHC      31.5 - 36.5 g/dL 33.9     RDW      10.0 - 15.0 % 12.7    Platelet Count      150 - 450 10e3/uL 144 (L)    Hemoglobin A1C      0.0 - 5.6 % 6.1 (H)       Legend:  (H) High  (L) Low    IMPRESSION/REPORT/PLAN  Neuropathy  Neuropathic pain  Restless leg syndrome  Prediabetes  Low normal ferritin  Possible TMJ    This is a 79 year old male  idiopathic neuropathy, neuropathic pain, question of restless leg syndrome.  EMG was confirmatory for neuropathy.  He has now been diagnosed with prediabetes which might be the cause of his neuropathy.  With worsening neuropathy blood work was repeated and this was negative.  Neuropathy now seems to have improved/stable on exam.  Would encourage exercise healthy lifestyle and weight loss if possible.  Encouraged going more for walks to help with balance.  Also recommend keeping the prediabetes under good control.    For neuropathic pain management Cymbalta has been tried in the past.  Gabapentin has been helpful and will further increase the dose.  His pain starts around 6 PM.  He is unable to tolerate the 4 PM dose as he feels very sleepy.  Currently is only taking 300 mg at 7 PM and 300 mg at 9 PM.   He does not want to do Lyrica.    For possible restless leg ferritin was negative.  Unclear diagnosis.  For tenderness low normal would recommend a multivitamin with iron to see if that helps.  Gabapentin should further help with this.  Continue.  Will check a ferritin for 2025.    Has new symptoms of TMJ.  Encouraged not to eat hard food.  Gabapentin should help.    Return in 1 year.    -     gabapentin (NEURONTIN) 100 MG capsule; 300 mg at 7:00 PM and 300 mg at 9:00 PM  -     Ferritin; Future-1 year  - Multivitamin with iron    Over 30 minutes were spent coordinating the care for the patient on the day of the encounter.  This includes previsit, during visit and post visit activities as documented above.  Counseling patient.  Prescription management.  Multiple problems reviewed.  Blood work  reviewed.  (Activities include but not inclusive of reviewing chart, reviewing outside records, reviewing labs and imaging study results as well as the images, patient visit time including getting history and exam,  use if applicable, review of test results with the patient and coming up with a plan in a shared model, counseling patient and family, education and answering patient questions, EMR , EMR diagnosis entry and problem list management, medication reconciliation and prescription management if applicable, paperwork if applicable, printing documents and documentation of the visit activities.)      Kulwinder Adame MD  Neurologist  [facility]  Tel:- [phone]    This note was dictated using voice recognition software.  Any grammatical or context distortions are unintentional and inherent to the software.    The longitudinal plan of care for the diagnosis(es)/condition(s) as documented were addressed during this visit. Due to the added complexity in care, I will continue to support Mickey in the subsequent management and with ongoing continuity of care.      Again, thank you for allowing me to participate in the care of your patient.        Sincerely,        Kulwinder Adame MD

## 2024-08-06 NOTE — NURSING NOTE
"Mickey Desouza is a 79 year old male who presents for:  Chief Complaint   Patient presents with    NEUROPATHY     A little bit worse in the R ankle; pain here and there.   Refill request for Gabapentin        Initial Vitals:  /70   Pulse 67   Wt 86.2 kg (190 lb 1.6 oz)   BMI 31.63 kg/m   Estimated body mass index is 31.63 kg/m  as calculated from the following:    Height as of 7/23/24: 1.651 m (5' 5\").    Weight as of this encounter: 86.2 kg (190 lb 1.6 oz).. Body surface area is 1.99 meters squared. BP completed using cuff size: wrist cuff    Carlos Leiva  "

## 2024-08-22 ENCOUNTER — ALLIED HEALTH/NURSE VISIT (OUTPATIENT)
Dept: FAMILY MEDICINE | Facility: CLINIC | Age: 79
End: 2024-08-22
Payer: MEDICARE

## 2024-08-22 DIAGNOSIS — Z23 ENCOUNTER FOR IMMUNIZATION: Primary | ICD-10-CM

## 2024-08-22 PROCEDURE — 99207 PR NO CHARGE NURSE ONLY: CPT

## 2024-08-22 PROCEDURE — 96372 THER/PROPH/DIAG INJ SC/IM: CPT | Performed by: INTERNAL MEDICINE

## 2024-08-22 RX ADMIN — CYANOCOBALAMIN 1000 MCG: 1000 INJECTION, SOLUTION INTRAMUSCULAR; SUBCUTANEOUS at 08:32

## 2024-09-04 ENCOUNTER — TRANSFERRED RECORDS (OUTPATIENT)
Dept: HEALTH INFORMATION MANAGEMENT | Facility: CLINIC | Age: 79
End: 2024-09-04
Payer: MEDICARE

## 2024-09-09 NOTE — TELEPHONE ENCOUNTER
Patient is currently taking losartan/HCTZ 50-12.5 mg. This is on back order. Please send separate prescriptions. [facility].    Anastasia Mike, CMA    
normal performance

## 2024-09-11 DIAGNOSIS — G62.9 NEUROPATHY: ICD-10-CM

## 2024-09-11 RX ORDER — GABAPENTIN 100 MG/1
CAPSULE ORAL
Qty: 540 CAPSULE | Refills: 2 | Status: SHIPPED | OUTPATIENT
Start: 2024-09-11

## 2024-09-11 NOTE — TELEPHONE ENCOUNTER
Refill request for: - gabapentin (NEURONTIN) 100 MG capsule; 300 mg at 7:00 PM and 300 mg at 9:00 PM     LOV: 8/6/24  NOV: 8/5/25    90 day supply with 2 refills Medication T'd for review and signature  Missy Gonsalez CMA on 9/11/2024 at 12:59 PM  [facility]

## 2024-09-19 ENCOUNTER — ALLIED HEALTH/NURSE VISIT (OUTPATIENT)
Dept: FAMILY MEDICINE | Facility: CLINIC | Age: 79
End: 2024-09-19
Payer: MEDICARE

## 2024-09-19 DIAGNOSIS — Z23 ENCOUNTER FOR IMMUNIZATION: Primary | ICD-10-CM

## 2024-09-19 PROCEDURE — 96372 THER/PROPH/DIAG INJ SC/IM: CPT | Performed by: INTERNAL MEDICINE

## 2024-09-19 PROCEDURE — 99207 PR NO CHARGE NURSE ONLY: CPT

## 2024-09-19 RX ADMIN — CYANOCOBALAMIN 1000 MCG: 1000 INJECTION, SOLUTION INTRAMUSCULAR; SUBCUTANEOUS at 09:45

## 2024-09-30 ENCOUNTER — TRANSFERRED RECORDS (OUTPATIENT)
Dept: HEALTH INFORMATION MANAGEMENT | Facility: CLINIC | Age: 79
End: 2024-09-30
Payer: MEDICARE

## 2024-10-08 ENCOUNTER — NURSE TRIAGE (OUTPATIENT)
Dept: INTERNAL MEDICINE | Facility: CLINIC | Age: 79
End: 2024-10-08
Payer: MEDICARE

## 2024-10-08 DIAGNOSIS — U07.1 INFECTION DUE TO 2019 NOVEL CORONAVIRUS: Primary | ICD-10-CM

## 2024-10-08 NOTE — TELEPHONE ENCOUNTER
COVID Positive/Requesting COVID treatment    Patient is positive for COVID and requesting treatment options.    Date of positive COVID test (PCR or at home)? Oct 8 at home  Current COVID symptoms: fever or chills, cough, fatigue, muscle or body aches, sore throat, and congestion or runny nose  Date COVID symptoms began: Oct 6,24    Message should be routed to clinic RN pool. Best phone number to use for call back: [phone]

## 2024-10-08 NOTE — TELEPHONE ENCOUNTER
RN COVID TREATMENT VISIT  10/08/24      The patient has been triaged and does not require a higher level of care.    Mickey Desouza  79 year old  Current weight? 190 lbs     Has the patient been seen by a primary care provider at an [facility] or [facility] within the past two years? Yes.   Have you been in close proximity to/do you have a known exposure to a person with a confirmed case of influenza? No.     General treatment eligibility:  Date of positive COVID test (PCR or at home)?  10/8/24    Are you or have you been hospitalized for this COVID-19 infection? No.   Have you received monoclonal antibodies or antiviral treatment for COVID-19 since this positive test? No.   Do you have any of the following conditions that place you at risk of being very sick from COVID-19?   - Age 50 years or older  Yes, patient has at least one high risk condition as noted above.     Current COVID symptoms:   - fever or chills  - cough  - muscle or body aches  - sore throat  - congestion or runny nose  - fatigue   Yes. Patient has at least one symptom as selected.     How many days since symptoms started? 5 days or less. Established patient, 12 years or older weighing at least 88.2 lbs, who has symptoms that started in the past 5 days, has not been hospitalized nor received treatment already, and is at risk for being very sick from COVID-19.     Treatment eligibility by RN:  Are you currently pregnant or nursing? No  Do you have a clinically significant hypersensitivity to nirmatrelvir or ritonavir, or toxic epidermal necrolysis (TEN) or Patiño-Grupo Syndrome? No  Do you have a history of hepatitis, any hepatic impairment on the Problem List (such as Child-Toussaint Class C, cirrhosis, fatty liver disease, alcoholic liver disease), or was the last liver lab (hepatic panel, ALT, AST, ALK Phos, bilirubin) elevated in the past 6 months? No  Do you have any history of severe renal impairment (eGFR <  30mL/min)? No    Is patient eligible to continue? Yes, patient meets all eligibility requirements for the RN COVID treatment (as denoted by all no responses above).     Current Outpatient Medications   Medication Sig Dispense Refill    aspirin (ASA) 81 MG chewable tablet Take 81 mg by mouth as needed for pain      Cyanocobalamin 1000 MCG/ML KIT Inject 1,000 mcg as directed every 30 days      gabapentin (NEURONTIN) 100 MG capsule TAKE 3 CAPS AT 7 PM AND TAKE 3 CAPS AT 9 PM AS DIRECTED. 540 capsule 2    glucosamine-chondroitin 500-400 MG CAPS per capsule   Instructions: Take 1 capsule by mouth 2 (two) times a day., Type: Maintenance      losartan-hydrochlorothiazide (HYZAAR) 50-12.5 MG tablet Take 1 tablet by mouth daily 90 tablet 3    Multiple Vitamins-Minerals (DAILY MULTIVITAMIN PO)   Instructions: Take 1 tablet by mouth daily., Type: Maintenance      omeprazole (PRILOSEC) 40 MG DR capsule Take 40 mg by mouth daily      penicillin V (VEETID) 500 MG tablet Take 500 mg by mouth 4 times daily For 10 days for tooth infection (Patient not taking: Reported on 8/6/2024)       Also have eye dropped, pf refresh tears, cream alisa 128 5% put on his eyelid every night for moisture.     Medications from List 1 of the standing order (on medications that exclude the use of Paxlovid) that patient is taking: NONE. Is patient taking Gabriela's Wort? No  Is patient taking Durbin's Wort or any meds from List 1? No.   Medications from List 2 of the standing order (on meds that provider needs to adjust) that patient is taking: NONE. Is patient on any of the meds from List 2? No.   Medications from List 3 of standing order (on meds that a RN needs to adjust) that patient is taking: NONE. Is patient on any meds from List 3? No.     Paxlovid has an approximate 90% reduction in hospitalization. Paxlovid can possibly cause altered sense of taste, diarrhea (loose, watery stools), high blood pressure, muscle aches.     Would patient like a  Paxlovid prescription?   Yes.   Lab Results   Component Value Date    GFRESTIMATED 56 (L) 07/23/2024       Was last eGFR reduced? Yes, eGFR 30-59 and will require reduced renal function dose of Paxlovid. Prescription sent to [place] [facility]. [facility] [address].     Temporary change to home medications:      All medication adjustments (holds, etc) were discussed with the patient and patient was asked to repeat back (teachback) their med adjustment.  Did patient understand med adjustment? No medication adjustments needed.         Reviewed the following instructions with the patient:    Paxlovid (nimatrelvir and ritonavir)    How it works  Two medicines (nirmatrelvir and ritonavir) are taken together. They stop the virus from growing. Less amount of virus is easier for your body to fight.    How to take  Medicine comes in a daily container with both medicine tablets. Take by mouth twice daily (once in the morning, once at night) for 5 days.  The number of tablets to take varies by patient.  Don't chew or break capsules. Swallow whole.    When to take  Take as soon as possible after positive COVID-19 test result, and within 5 days of your first symptoms.    Possible side effects  Can cause altered sense of taste, diarrhea (loose, watery stools), high blood pressure, muscle aches.    Jorgito García RN

## 2024-10-08 NOTE — TELEPHONE ENCOUNTER
Nurse Triage SBAR    Is this a 2nd Level Triage? YES, LICENSED PRACTITIONER REVIEW IS REQUIRED    Situation:  Covid positive test 10/8/24, symptoms started 10/6/24.     Background:  Went to  this past weekend from out of town and coming back with symptoms.     Assessment:      1. SYMPTOMS:   - Chills - 99 this morning.   - cough  - muscle or body aches  - sore throat  - congestion or runny nose  - fatigue   2. ONSET:  10/6/24.   3. COUGH: mild, from sore throat.   4. FEVER: Denies   5. BREATHING DIFFICULTY: Denies   6. BETTER-SAME-WORSE: worsen compare to .   7. OTHER SYMPTOMS:  chills, fatigue, headache, muscle pain, sore throat)   8. COVID-19 DIAGNOSIS: home test.   9. COVID-19 EXPOSURE: unsure   10. COVID-19 VACCINE:  Denies being up to date.   11. HIGH RISK DISEASE: Denies hx of asthma, heart or lung disease, weak immune system, obesity      Denies breathing difficulties, stiff neck, chest pain.     Protocol Recommended Disposition:   Call PCP within 24 hours. Protocol run. Qualify for paxlovid.    Recommendation:  Care advise and red flags reviewed. Quarantine guideline from CDC recommended. All questions were answer. No further questions at this time.      Does the patient meet one of the following criteria for ADS visit consideration? 16+ years old, with an [facility] PCP     TIP  Providers, please consider if this condition is appropriate for management at one of our Acute and Diagnostic Services sites.     If patient is a good candidate, please use dotphrase <dot>triageresponse and select Refer to ADS to document.      Reason for Disposition   [1] HIGH RISK patient (e.g., weak immune system, age > 64 years, obesity with BMI 30 or higher, pregnant, chronic lung disease) AND [2] COVID symptoms (e.g., cough, fever)  (Exceptions: Already seen by PCP and no new or worsening symptoms.)    Additional Information   Negative: SEVERE difficulty breathing (e.g., struggling for each breath, speaks in single  words)   Negative: Difficult to awaken or acting confused (e.g., disoriented, slurred speech)   Negative: Bluish (or gray) lips or face now   Negative: Shock suspected (e.g., cold/pale/clammy skin, too weak to stand, low BP, rapid pulse)   Negative: Sounds like a life-threatening emergency to the triager   Negative: [1] Diagnosed or suspected COVID-19 AND [2] symptoms lasting 3 or more weeks   Negative: [1] COVID-19 exposure AND [2] no symptoms   Negative: COVID-19 vaccine reaction suspected (e.g., fever, headache, muscle aches) occurring 1 to 3 days after getting vaccine   Negative: COVID-19 vaccine, questions about   Negative: [1] Exposure to someone known to have influenza (flu test positive) AND [2] flu-like symptoms (e.g., cough, runny nose, sore throat, SOB; with or without fever)   Negative: [1] Possible COVID-19 symptoms AND [2] triager concerned about severity of symptoms or other causes   Negative: COVID-19 and breastfeeding, questions about   Negative: SEVERE or constant chest pain or pressure  (Exception: Mild central chest pain, present only when coughing.)   Negative: MODERATE difficulty breathing (e.g., speaks in phrases, SOB even at rest, pulse 100-120)   Negative: [1] Headache AND [2] stiff neck (can't touch chin to chest)   Negative: Oxygen level (e.g., pulse oximetry) 90% or lower   Negative: Chest pain or pressure  (Exception: MILD central chest pain, present only when coughing.)   Negative: [1] Drinking very little AND [2] dehydration suspected (e.g., no urine > 12 hours, very dry mouth, very lightheaded)   Negative: Patient sounds very sick or weak to the triager   Negative: MILD difficulty breathing (e.g., minimal/no SOB at rest, SOB with walking, pulse <100)   Negative: Fever > 103 F (39.4 C)   Negative: [1] Fever > 101 F (38.3 C) AND [2] age > 60 years   Negative: [1] Fever > 100 F (37.8 C) AND [2] bedridden (e.g., CVA, chronic illness, recovering from surgery)   Negative: Oxygen level  (e.g., pulse oximetry) 91 to 94%    Protocols used: COVID-19 - Diagnosed or [id number] WILSON RN

## 2024-10-17 ENCOUNTER — ALLIED HEALTH/NURSE VISIT (OUTPATIENT)
Dept: FAMILY MEDICINE | Facility: CLINIC | Age: 79
End: 2024-10-17
Payer: MEDICARE

## 2024-10-17 DIAGNOSIS — E53.8 B12 DEFICIENCY: Primary | ICD-10-CM

## 2024-10-17 PROCEDURE — 96372 THER/PROPH/DIAG INJ SC/IM: CPT | Performed by: INTERNAL MEDICINE

## 2024-10-17 PROCEDURE — 99207 PR NO CHARGE NURSE ONLY: CPT

## 2024-10-17 RX ADMIN — CYANOCOBALAMIN 1000 MCG: 1000 INJECTION, SOLUTION INTRAMUSCULAR; SUBCUTANEOUS at 07:56

## 2024-11-01 ENCOUNTER — TRANSFERRED RECORDS (OUTPATIENT)
Dept: HEALTH INFORMATION MANAGEMENT | Facility: CLINIC | Age: 79
End: 2024-11-01
Payer: MEDICARE

## 2024-11-08 ENCOUNTER — TRANSFERRED RECORDS (OUTPATIENT)
Dept: HEALTH INFORMATION MANAGEMENT | Facility: CLINIC | Age: 79
End: 2024-11-08
Payer: MEDICARE

## 2024-11-14 ENCOUNTER — ALLIED HEALTH/NURSE VISIT (OUTPATIENT)
Dept: FAMILY MEDICINE | Facility: CLINIC | Age: 79
End: 2024-11-14
Payer: MEDICARE

## 2024-11-14 DIAGNOSIS — E53.8 VITAMIN B12 DEFICIENCY (NON ANEMIC): Primary | ICD-10-CM

## 2024-11-14 PROCEDURE — 99207 PR NO CHARGE NURSE ONLY: CPT

## 2024-11-14 PROCEDURE — 96372 THER/PROPH/DIAG INJ SC/IM: CPT | Performed by: INTERNAL MEDICINE

## 2024-11-14 RX ADMIN — CYANOCOBALAMIN 1000 MCG: 1000 INJECTION, SOLUTION INTRAMUSCULAR; SUBCUTANEOUS at 08:14

## 2024-11-20 ENCOUNTER — TRANSFERRED RECORDS (OUTPATIENT)
Dept: HEALTH INFORMATION MANAGEMENT | Facility: CLINIC | Age: 79
End: 2024-11-20
Payer: MEDICARE

## 2024-12-04 ENCOUNTER — TRANSFERRED RECORDS (OUTPATIENT)
Dept: HEALTH INFORMATION MANAGEMENT | Facility: CLINIC | Age: 79
End: 2024-12-04
Payer: MEDICARE

## 2024-12-12 ENCOUNTER — TRANSFERRED RECORDS (OUTPATIENT)
Dept: HEALTH INFORMATION MANAGEMENT | Facility: CLINIC | Age: 79
End: 2024-12-12

## 2024-12-12 ENCOUNTER — ALLIED HEALTH/NURSE VISIT (OUTPATIENT)
Dept: FAMILY MEDICINE | Facility: CLINIC | Age: 79
End: 2024-12-12
Payer: MEDICARE

## 2024-12-12 DIAGNOSIS — E53.8 VITAMIN B12 DEFICIENCY (NON ANEMIC): Primary | ICD-10-CM

## 2024-12-12 RX ADMIN — CYANOCOBALAMIN 1000 MCG: 1000 INJECTION, SOLUTION INTRAMUSCULAR; SUBCUTANEOUS at 08:00

## 2025-01-23 ENCOUNTER — OFFICE VISIT (OUTPATIENT)
Dept: INTERNAL MEDICINE | Facility: CLINIC | Age: 80
End: 2025-01-23
Payer: MEDICARE

## 2025-01-23 VITALS
RESPIRATION RATE: 16 BRPM | SYSTOLIC BLOOD PRESSURE: 124 MMHG | BODY MASS INDEX: 30.82 KG/M2 | HEIGHT: 65 IN | WEIGHT: 185 LBS | HEART RATE: 70 BPM | DIASTOLIC BLOOD PRESSURE: 62 MMHG | TEMPERATURE: 98.2 F | OXYGEN SATURATION: 97 %

## 2025-01-23 DIAGNOSIS — R73.03 PREDIABETES: ICD-10-CM

## 2025-01-23 DIAGNOSIS — G62.9 NEUROPATHY: ICD-10-CM

## 2025-01-23 DIAGNOSIS — I10 ESSENTIAL (PRIMARY) HYPERTENSION: ICD-10-CM

## 2025-01-23 DIAGNOSIS — N18.31 STAGE 3A CHRONIC KIDNEY DISEASE (H): ICD-10-CM

## 2025-01-23 DIAGNOSIS — E53.8 VITAMIN B12 DEFICIENCY (NON ANEMIC): Primary | ICD-10-CM

## 2025-01-23 DIAGNOSIS — Z86.39 PERSONAL HISTORY OF OTHER ENDOCRINE, NUTRITIONAL AND METABOLIC DISEASE: ICD-10-CM

## 2025-01-23 LAB
ANION GAP SERPL CALCULATED.3IONS-SCNC: 10 MMOL/L (ref 7–15)
BUN SERPL-MCNC: 21.9 MG/DL (ref 8–23)
CALCIUM SERPL-MCNC: 9.6 MG/DL (ref 8.8–10.4)
CHLORIDE SERPL-SCNC: 100 MMOL/L (ref 98–107)
CREAT SERPL-MCNC: 1.26 MG/DL (ref 0.67–1.17)
EGFRCR SERPLBLD CKD-EPI 2021: 58 ML/MIN/1.73M2
ERYTHROCYTE [DISTWIDTH] IN BLOOD BY AUTOMATED COUNT: 12.5 % (ref 10–15)
EST. AVERAGE GLUCOSE BLD GHB EST-MCNC: 131 MG/DL
GLUCOSE SERPL-MCNC: 89 MG/DL (ref 70–99)
HBA1C MFR BLD: 6.2 % (ref 0–5.6)
HCO3 SERPL-SCNC: 25 MMOL/L (ref 22–29)
HCT VFR BLD AUTO: 39.3 % (ref 40–53)
HGB BLD-MCNC: 13.8 G/DL (ref 13.3–17.7)
MCH RBC QN AUTO: 32.6 PG (ref 26.5–33)
MCHC RBC AUTO-ENTMCNC: 35.1 G/DL (ref 31.5–36.5)
MCV RBC AUTO: 93 FL (ref 78–100)
PLATELET # BLD AUTO: 166 10E3/UL (ref 150–450)
POTASSIUM SERPL-SCNC: 4.3 MMOL/L (ref 3.4–5.3)
RBC # BLD AUTO: 4.23 10E6/UL (ref 4.4–5.9)
SODIUM SERPL-SCNC: 135 MMOL/L (ref 135–145)
TSH SERPL DL<=0.005 MIU/L-ACNC: 1.15 UIU/ML (ref 0.3–4.2)
WBC # BLD AUTO: 6.6 10E3/UL (ref 4–11)

## 2025-01-23 RX ORDER — IMIQUIMOD 12.5 MG/.25G
CREAM TOPICAL
COMMUNITY
Start: 2024-11-07

## 2025-01-23 RX ADMIN — CYANOCOBALAMIN 1000 MCG: 1000 INJECTION, SOLUTION INTRAMUSCULAR; SUBCUTANEOUS at 08:53

## 2025-01-23 NOTE — PROGRESS NOTES
Office Visit - Follow Up   Mickey Desouza   79 year old male    Date of Visit: 1/23/2025    Chief Complaint   Patient presents with    Hypertension        -------------------------------------------------------------------------------------------------------------------------  Assessment and Plan    Follow-up, midyear check-in     To monitor kidney function, pernicious anemia (on B12 replacement), and prediabetes, will check Mickey is basic metabolic panel, blood cell counts, and hemoglobin A1c.  Also TSH for thyroid     79-year-old man, retired  and      Pancreatic cyst  Endoscopic ultrasound for surveillance 11-  Impression:  - A cystic lesion was seen in the pancreatic body.                          Tissue has not been obtained. However, the endosonographic appearance is highly suspicious for a  branched intraductal papillary mucinous neoplasm.                          - Normal examined esophagus.   Recommendation:                          - Repeat the upper endoscopic ultrasound in 2 years                          for surveillance.      surveillance upper endoscopy ultrasound on November 11, 2021,  Dr. Fidel Salter     History of recurrent bouts of pancreatitis 1989, 1990, 96, 2008, with a 1.1 x 1.9 cm pancreatic cyst most recently imaged by CT October 19, 2018, stable in size.  He told me that alcohol consumption is 0.     Gastroesophageal reflux and hiatal hernia, upper endoscopy June 8, 2022 at [facility], on omeprazole 20 mg  History of Norton's esophagus  Upper endoscopy November 20, 2024 found some lymphocytic esophagitis, and Mickey also underwent successful dilatation of the esophagus    No longer bothered by Loose stools, gas, sometimes diarrhea, which tends to occur after breakfast.  Consider possibility of lactose intolerance.     Chronic laryngitis/globus sensation from his known acid reflux and hiatal hernia  He had a helpful consultation with  otolaryngology Dr. Bradley March 9, 2022 because of chronic throat pain  Dr. Bradley's observed chronic laryngitis on fiberoptic laryngoscopy  I also reminded him of the importance of not lying down within 2 hours of eating      Peripheral neuropathy with numbness in his feet, also restless leg syndrome, has been working with neurologist Dr. Adame, and is currently on gabapentin 100 mg capsules  300 mg at 7:00 PM, 200 mg at 9:00 PM and 200 mg at 10:00 PM, Disp-630 capsule     He has undergone EMG which demonstrated neuropathy.  Laboratory work did not identify an underlying cause.  He experiences pins-and-needles dysesthesias.  Ferritin level was normal.  Past trial of Cymbalta did not produce much relief.      Trial of topiramate was unsuccessful because of intolerable side effects of sleepiness, and therefore discontinued yesterday October 25, 2021      Essential hypertension, blood pressure nicely controlled on combination of losartan 50 mg a day with hydrochlorothiazide 12.5 mg a day.  Goal blood pressure is 120/80.     BP Readings from Last 6 Encounters:   01/23/25 124/62   08/06/24 135/70   07/23/24 130/70   01/19/24 120/68   11/10/23 139/70   10/31/23 110/72     lipids good, no medicine November 2020  Cholesterol      <=199 mg/dL   157   Triglycerides    <=149 mg/dL   133   Direct Measure HDL   >=40 mg/dL     44   LDL Cholesterol Calculated    <=129 mg/dL   86      Chronic kidney disease stage 3A with GFR of 53  1-  Creatinine  0.67 - 1.17 mg/dL 1.43      GFR Estimate  >60 mL/min/1.73m2 50 Low       Benign prostatic hyperplasia with urinary frequency, Flomax was ineffective, PSA was quite satisfactory only 1.0 measured November 20, 2020.  He told me that he empties his bladder completely, and that is the most important thing.     Prediabetes in the context of obesity  1-  Hemoglobin A1C  0.0 - 5.6 % 6.0 High       History of pernicious anemia taking vitamin B12 injections monthly     Obesity  with body mass index of 31.8 I would like to see him lose 20 pounds this year.  I reminded him about the importance of eating slower, controlling portion size, and identifying problem foods to curtail or eliminate, especially starches, sweets, fried foods.     Wt Readings from Last 5 Encounters:   01/23/25 83.9 kg (185 lb)   08/06/24 86.2 kg (190 lb 1.6 oz)   07/23/24 86.6 kg (191 lb)   01/19/24 88 kg (194 lb)   11/10/23 87.3 kg (192 lb 8 oz)     Lumbar spinal stenosis, for which has been getting epidural steroid injections approximately twice a year.    He recalls last epidural steroid injection was in June 2021, which gave him relief for a couple months, but he is hurting again.     He is working with a chiropractor twice a week  But I think he might benefit from some additional physical therapy.  He would like to go to [facility] in [place], slight entered external referral.     He told me that he might use 1 or 2 tramadol tablets a week for his back.  I reminded him to minimize the use of the tramadol, because it can contribute to constipation.  I will renew his prescription today for 30 tablets.     Left-sided triceps pain, which Mickey told me has been present for years, comes and goes, and tends to flareup after physical activity, for example raking the lawn.   He goes to [facility] in [place] to get aquatic therapy     Chronic neck pain.  Similar to his back, he needs to work on strengthening her upper back muscles also deep neck muscles, sleep on a properly supportive pillow, and maintain mobility and flexibility.      Status post right total knee arthroplasty, initially 2004, redo in 2009, now doing well.     Status post cataract surgery both eyes 2018, dry eye syndrome recently prescribed Restasis drops     Left otitis media, January 15, 2024  Responded nicely to amoxicillin 875 mg twice a day for 5 days     Constipation, he told me that he might go 2 to 3 days without a bowel  movement.  I reminded him to minimize the tramadol opioid.  He asked about Linzess, but I think he needs to first go with the first line management which would be MiraLAX (polyethylene glycol powder).  I told him that he could take 1 capful of MiraLAX powder a day, diluted into his favorite beverage.     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  imiquimod (ALDARA) 5 % external cream      Vaccinated and boosted X1 for COVID, third shot October 26, 2021  Mickey opts out of flu vaccine and COVID-19 booster.    --------------------------------------------------------------------------------------------------------------------------  History of Present Illness  This 79 year old old       History of Present Illness         Reason for visit:  Medication follow-up.   He is taking medications regularly.      Wt Readings from Last 3 Encounters:   01/23/25 83.9 kg (185 lb)   08/06/24 86.2 kg (190 lb 1.6 oz)   07/23/24 86.6 kg (191 lb)     BP Readings from Last 3 Encounters:   01/23/25 124/62   08/06/24 135/70   07/23/24 130/70         ---------------------------------------------------------------------------------------------------------------------------    Medications, Allergies, Social, and Problem List       Current Outpatient Medications   Medication Sig Dispense Refill    aspirin (ASA) 81 MG chewable tablet Take 81 mg by mouth as needed for pain      Cyanocobalamin 1000 MCG/ML KIT Inject 1,000 mcg as directed every 30 days      gabapentin (NEURONTIN) 100 MG capsule TAKE 3 CAPS AT 7 PM AND TAKE 3 CAPS AT 9 PM AS DIRECTED. 540 capsule 2    glucosamine-chondroitin 500-400 MG CAPS per  "capsule   Instructions: Take 1 capsule by mouth 2 (two) times a day., Type: Maintenance      imiquimod (ALDARA) 5 % external cream       losartan-hydrochlorothiazide (HYZAAR) 50-12.5 MG tablet Take 1 tablet by mouth daily 90 tablet 3    Multiple Vitamins-Minerals (DAILY MULTIVITAMIN PO)   Instructions: Take 1 tablet by mouth daily., Type: Maintenance      omeprazole (PRILOSEC) 20 MG DR capsule Take 20 mg by mouth daily.      omeprazole (PRILOSEC) 40 MG DR capsule Take 40 mg by mouth daily      penicillin V (VEETID) 500 MG tablet Take 500 mg by mouth 4 times daily. For 10 days for tooth infection       Allergies   Allergen Reactions    Ciprofloxacin Unknown    Codeine Other (See Comments)     Abd pain    Lisinopril Cough    Morphine Other (See Comments)     Abd pain    Sulfamethizole Nausea and Vomiting     Social History     Tobacco Use    Smoking status: Never     Passive exposure: Never    Smokeless tobacco: Never   Vaping Use    Vaping status: Never Used   Substance Use Topics    Alcohol use: Not Currently     Comment: last consumed >1 year ago    Drug use: No     Patient Active Problem List   Diagnosis    Urinary frequency    Restless legs syndrome    Neuropathy    Neuropathic pain    Neck pain, chronic    Hypertension    Allergic rhinitis    Burning sensation of feet    Gastroesophageal reflux disease    Obesity    Pancreatic cyst    Spinal stenosis, lumbar region, with neurogenic claudication        Reviewed, reconciled and updated       Physical Exam   General Appearance:       /62 (BP Location: Right arm, Patient Position: Sitting, Cuff Size: Adult Regular)   Pulse 70   Temp 98.2  F (36.8  C)   Resp 16   Ht 1.645 m (5' 4.75\")   Wt 83.9 kg (185 lb)   SpO2 97%   BMI 31.02 kg/m      Mickey looked great today, was finishing in the last sips of coffee from [employer]  Heart rhythm regular  No leg edema.     Additional Information   I spent 20 minutes on this encounter, including " reviewing interval history since last visit, examining the patient, explaining and counseling the issues enumerated in the Assessment and Plan (patient given a copy), ordering indicated tests    The longitudinal plan of care for the diagnosis(es)/condition(s) as documented were addressed during this visit. Due to the added complexity in care, I will continue to support Mickey in the subsequent management and with ongoing continuity of care.       BORA AC MD, MD    Signed Electronically by: BORA AC MD

## 2025-01-23 NOTE — PATIENT INSTRUCTIONS
Follow-up, midyear check-in     To monitor kidney function, pernicious anemia (on B12 replacement), and prediabetes, will check Mickey is basic metabolic panel, blood cell counts, and hemoglobin A1c.  Also TSH for thyroid     79-year-old man, retired  and      Pancreatic cyst  Endoscopic ultrasound for surveillance 11-  Impression:  - A cystic lesion was seen in the pancreatic body.                          Tissue has not been obtained. However, the endosonographic appearance is highly suspicious for a  branched intraductal papillary mucinous neoplasm.                          - Normal examined esophagus.   Recommendation:                          - Repeat the upper endoscopic ultrasound in 2 years                          for surveillance.      surveillance upper endoscopy ultrasound on November 11, 2021,  Dr. Fidel Salter     History of recurrent bouts of pancreatitis 1989, 1990, 96, 2008, with a 1.1 x 1.9 cm pancreatic cyst most recently imaged by CT October 19, 2018, stable in size.  He told me that alcohol consumption is 0.     Gastroesophageal reflux and hiatal hernia, upper endoscopy June 8, 2022 at [facility], on omeprazole 20 mg  History of Norton's esophagus  Upper endoscopy November 20, 2024 found some lymphocytic esophagitis, and Mickey also underwent successful dilatation of the esophagus    No longer bothered by Loose stools, gas, sometimes diarrhea, which tends to occur after breakfast.  Consider possibility of lactose intolerance.     Chronic laryngitis/globus sensation from his known acid reflux and hiatal hernia  He had a helpful consultation with otolaryngology Dr. Bradley March 9, 2022 because of chronic throat pain  Dr. Bradley's observed chronic laryngitis on fiberoptic laryngoscopy  I also reminded him of the importance of not lying down within 2 hours of eating      Peripheral neuropathy with numbness in his feet, also restless leg syndrome, has been  working with neurologist Dr. Adame, and is currently on gabapentin 100 mg capsules  300 mg at 7:00 PM, 200 mg at 9:00 PM and 200 mg at 10:00 PM, Disp-630 capsule     He has undergone EMG which demonstrated neuropathy.  Laboratory work did not identify an underlying cause.  He experiences pins-and-needles dysesthesias.  Ferritin level was normal.  Past trial of Cymbalta did not produce much relief.      Trial of topiramate was unsuccessful because of intolerable side effects of sleepiness, and therefore discontinued yesterday October 25, 2021      Essential hypertension, blood pressure nicely controlled on combination of losartan 50 mg a day with hydrochlorothiazide 12.5 mg a day.  Goal blood pressure is 120/80.     BP Readings from Last 6 Encounters:   01/23/25 124/62   08/06/24 135/70   07/23/24 130/70   01/19/24 120/68   11/10/23 139/70   10/31/23 110/72     lipids good, no medicine November 2020  Cholesterol      <=199 mg/dL   157   Triglycerides    <=149 mg/dL   133   Direct Measure HDL   >=40 mg/dL     44   LDL Cholesterol Calculated    <=129 mg/dL   86      Chronic kidney disease stage 3A with GFR of 53  1-  Creatinine  0.67 - 1.17 mg/dL 1.43      GFR Estimate  >60 mL/min/1.73m2 50 Low       Benign prostatic hyperplasia with urinary frequency, Flomax was ineffective, PSA was quite satisfactory only 1.0 measured November 20, 2020.  He told me that he empties his bladder completely, and that is the most important thing.     Prediabetes in the context of obesity  1-  Hemoglobin A1C  0.0 - 5.6 % 6.0 High       History of pernicious anemia taking vitamin B12 injections monthly     Obesity with body mass index of 31.8 I would like to see him lose 20 pounds this year.  I reminded him about the importance of eating slower, controlling portion size, and identifying problem foods to curtail or eliminate, especially starches, sweets, fried foods.     Wt Readings from Last 5 Encounters:   01/23/25 83.9 kg  (185 lb)   08/06/24 86.2 kg (190 lb 1.6 oz)   07/23/24 86.6 kg (191 lb)   01/19/24 88 kg (194 lb)   11/10/23 87.3 kg (192 lb 8 oz)     Lumbar spinal stenosis, for which has been getting epidural steroid injections approximately twice a year.    He recalls last epidural steroid injection was in June 2021, which gave him relief for a couple months, but he is hurting again.     He is working with a chiropractor twice a week  But I think he might benefit from some additional physical therapy.  He would like to go to [facility] in [place], slight entered external referral.     He told me that he might use 1 or 2 tramadol tablets a week for his back.  I reminded him to minimize the use of the tramadol, because it can contribute to constipation.  I will renew his prescription today for 30 tablets.     Left-sided triceps pain, which Mickey told me has been present for years, comes and goes, and tends to flareup after physical activity, for example raking the lawn.   He goes to [facility] in [place] to get aquatic therapy     Chronic neck pain.  Similar to his back, he needs to work on strengthening her upper back muscles also deep neck muscles, sleep on a properly supportive pillow, and maintain mobility and flexibility.      Status post right total knee arthroplasty, initially 2004, redo in 2009, now doing well.     Status post cataract surgery both eyes 2018, dry eye syndrome recently prescribed Restasis drops     Left otitis media, January 15, 2024  Responded nicely to amoxicillin 875 mg twice a day for 5 days     Constipation, he told me that he might go 2 to 3 days without a bowel movement.  I reminded him to minimize the tramadol opioid.  He asked about Linzess, but I think he needs to first go with the first line management which would be MiraLAX (polyethylene glycol powder).  I told him that he could take 1 capful of MiraLAX powder a day, diluted into his favorite beverage.     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  imiquimod (ALDARA) 5 % external cream      Vaccinated and boosted X1 for COVID, third shot October 26, 2021  Mickey opts out of flu vaccine and COVID-19 booster.

## 2025-01-28 ENCOUNTER — TELEPHONE (OUTPATIENT)
Dept: INTERNAL MEDICINE | Facility: CLINIC | Age: 80
End: 2025-01-28
Payer: MEDICARE

## 2025-01-28 NOTE — TELEPHONE ENCOUNTER
Patient Quality Outreach    Patient is due for the following:   Physical Annual Wellness Visit    Action(s) Taken:   Patient to call back and schedule an AWV    Type of outreach:    Sent Hit[employer] message.    Questions for provider review:    None           Joy C Steinert, CMA

## 2025-01-28 NOTE — LETTER
January 28, 2025      Mickey Desouza  [address] [address]      Your healthcare team cares about your health. To provide you with the best care, we have reviewed your chart and based on our findings, we see that you are due to:     PREVENTATIVE VISIT: Annual Medicare Wellness:Schedule an Annual Medicare Wellness Exam. Please call your [facility] clinic to set up your appointment.    If you have already completed these items, please contact the clinic via phone or Mychart so your care team can review and update your records.  Thank you for choosing [facility] for your healthcare needs. For any questions, concerns, or to schedule an appointment please contact the clinic.     Healthy Regards,    Your [facility] Care Team

## 2025-02-27 ENCOUNTER — ALLIED HEALTH/NURSE VISIT (OUTPATIENT)
Dept: FAMILY MEDICINE | Facility: CLINIC | Age: 80
End: 2025-02-27
Payer: MEDICARE

## 2025-02-27 DIAGNOSIS — E53.8 VITAMIN B12 DEFICIENCY (NON ANEMIC): Primary | ICD-10-CM

## 2025-02-27 RX ADMIN — CYANOCOBALAMIN 1000 MCG: 1000 INJECTION, SOLUTION INTRAMUSCULAR; SUBCUTANEOUS at 10:30

## 2025-03-21 ENCOUNTER — TRANSFERRED RECORDS (OUTPATIENT)
Dept: HEALTH INFORMATION MANAGEMENT | Facility: CLINIC | Age: 80
End: 2025-03-21
Payer: MEDICARE

## 2025-03-27 ENCOUNTER — ALLIED HEALTH/NURSE VISIT (OUTPATIENT)
Dept: FAMILY MEDICINE | Facility: CLINIC | Age: 80
End: 2025-03-27
Payer: MEDICARE

## 2025-03-27 DIAGNOSIS — Z23 ENCOUNTER FOR IMMUNIZATION: Primary | ICD-10-CM

## 2025-03-27 RX ADMIN — CYANOCOBALAMIN 1000 MCG: 1000 INJECTION, SOLUTION INTRAMUSCULAR; SUBCUTANEOUS at 08:28

## 2025-04-10 ENCOUNTER — TRANSFERRED RECORDS (OUTPATIENT)
Dept: HEALTH INFORMATION MANAGEMENT | Facility: CLINIC | Age: 80
End: 2025-04-10
Payer: MEDICARE

## 2025-04-11 ENCOUNTER — TRANSFERRED RECORDS (OUTPATIENT)
Dept: HEALTH INFORMATION MANAGEMENT | Facility: CLINIC | Age: 80
End: 2025-04-11
Payer: MEDICARE

## 2025-04-24 ENCOUNTER — ALLIED HEALTH/NURSE VISIT (OUTPATIENT)
Dept: FAMILY MEDICINE | Facility: CLINIC | Age: 80
End: 2025-04-24
Payer: MEDICARE

## 2025-04-24 DIAGNOSIS — E53.8 B12 DEFICIENCY: Primary | ICD-10-CM

## 2025-04-24 RX ADMIN — CYANOCOBALAMIN 1000 MCG: 1000 INJECTION, SOLUTION INTRAMUSCULAR; SUBCUTANEOUS at 08:35

## 2025-05-06 ENCOUNTER — PATIENT OUTREACH (OUTPATIENT)
Dept: CARE COORDINATION | Facility: CLINIC | Age: 80
End: 2025-05-06
Payer: MEDICARE

## 2025-05-16 ENCOUNTER — TRANSFERRED RECORDS (OUTPATIENT)
Dept: HEALTH INFORMATION MANAGEMENT | Facility: CLINIC | Age: 80
End: 2025-05-16
Payer: MEDICARE

## 2025-05-22 ENCOUNTER — ALLIED HEALTH/NURSE VISIT (OUTPATIENT)
Dept: FAMILY MEDICINE | Facility: CLINIC | Age: 80
End: 2025-05-22
Payer: MEDICARE

## 2025-05-22 DIAGNOSIS — Z23 ENCOUNTER FOR IMMUNIZATION: Primary | ICD-10-CM

## 2025-05-22 RX ADMIN — CYANOCOBALAMIN 1000 MCG: 1000 INJECTION, SOLUTION INTRAMUSCULAR; SUBCUTANEOUS at 08:37

## 2025-05-30 ENCOUNTER — TRANSFERRED RECORDS (OUTPATIENT)
Dept: HEALTH INFORMATION MANAGEMENT | Facility: CLINIC | Age: 80
End: 2025-05-30
Payer: MEDICARE

## 2025-06-26 ENCOUNTER — ALLIED HEALTH/NURSE VISIT (OUTPATIENT)
Dept: FAMILY MEDICINE | Facility: CLINIC | Age: 80
End: 2025-06-26
Payer: MEDICARE

## 2025-06-26 DIAGNOSIS — Z23 ENCOUNTER FOR IMMUNIZATION: Primary | ICD-10-CM

## 2025-06-26 RX ADMIN — CYANOCOBALAMIN 1000 MCG: 1000 INJECTION, SOLUTION INTRAMUSCULAR; SUBCUTANEOUS at 08:33

## 2025-06-30 ENCOUNTER — TRANSFERRED RECORDS (OUTPATIENT)
Dept: HEALTH INFORMATION MANAGEMENT | Facility: CLINIC | Age: 80
End: 2025-06-30
Payer: MEDICARE

## 2025-07-16 ENCOUNTER — TRANSFERRED RECORDS (OUTPATIENT)
Dept: HEALTH INFORMATION MANAGEMENT | Facility: CLINIC | Age: 80
End: 2025-07-16
Payer: MEDICARE

## 2025-07-19 ENCOUNTER — HEALTH MAINTENANCE LETTER (OUTPATIENT)
Age: 80
End: 2025-07-19

## 2025-07-21 ENCOUNTER — TRANSFERRED RECORDS (OUTPATIENT)
Dept: HEALTH INFORMATION MANAGEMENT | Facility: CLINIC | Age: 80
End: 2025-07-21
Payer: MEDICARE

## 2025-07-31 ENCOUNTER — ALLIED HEALTH/NURSE VISIT (OUTPATIENT)
Dept: FAMILY MEDICINE | Facility: CLINIC | Age: 80
End: 2025-07-31
Payer: MEDICARE

## 2025-07-31 DIAGNOSIS — E53.8 VITAMIN B12 DEFICIENCY (NON ANEMIC): Primary | ICD-10-CM

## 2025-07-31 RX ADMIN — CYANOCOBALAMIN 1000 MCG: 1000 INJECTION, SOLUTION INTRAMUSCULAR; SUBCUTANEOUS at 08:31

## 2025-08-11 ENCOUNTER — OFFICE VISIT (OUTPATIENT)
Dept: NEUROLOGY | Facility: CLINIC | Age: 80
End: 2025-08-11
Payer: MEDICARE

## 2025-08-11 VITALS
HEIGHT: 65 IN | WEIGHT: 188 LBS | SYSTOLIC BLOOD PRESSURE: 126 MMHG | BODY MASS INDEX: 31.32 KG/M2 | DIASTOLIC BLOOD PRESSURE: 61 MMHG | HEART RATE: 70 BPM

## 2025-08-11 DIAGNOSIS — M79.2 NEUROPATHIC PAIN: ICD-10-CM

## 2025-08-11 DIAGNOSIS — G25.81 RESTLESS LEG SYNDROME: ICD-10-CM

## 2025-08-11 DIAGNOSIS — G62.9 NEUROPATHY: Primary | ICD-10-CM

## 2025-08-11 DIAGNOSIS — R79.0 LOW FERRITIN: ICD-10-CM

## 2025-08-11 PROCEDURE — G2211 COMPLEX E/M VISIT ADD ON: HCPCS | Performed by: PSYCHIATRY & NEUROLOGY

## 2025-08-11 PROCEDURE — 3074F SYST BP LT 130 MM HG: CPT | Performed by: PSYCHIATRY & NEUROLOGY

## 2025-08-11 PROCEDURE — 3078F DIAST BP <80 MM HG: CPT | Performed by: PSYCHIATRY & NEUROLOGY

## 2025-08-11 PROCEDURE — 99214 OFFICE O/P EST MOD 30 MIN: CPT | Performed by: PSYCHIATRY & NEUROLOGY

## 2025-08-11 RX ORDER — GABAPENTIN 100 MG/1
CAPSULE ORAL
Qty: 540 CAPSULE | Refills: 2 | Status: SHIPPED | OUTPATIENT
Start: 2025-08-11

## 2025-08-13 ENCOUNTER — TRANSFERRED RECORDS (OUTPATIENT)
Dept: HEALTH INFORMATION MANAGEMENT | Facility: CLINIC | Age: 80
End: 2025-08-13
Payer: MEDICARE

## 2025-08-18 DIAGNOSIS — I10 ESSENTIAL (PRIMARY) HYPERTENSION: ICD-10-CM

## 2025-08-18 RX ORDER — LOSARTAN POTASSIUM AND HYDROCHLOROTHIAZIDE 12.5; 5 MG/1; MG/1
1 TABLET ORAL DAILY
Qty: 90 TABLET | Refills: 3 | Status: SHIPPED | OUTPATIENT
Start: 2025-08-18

## 2025-08-28 ENCOUNTER — ALLIED HEALTH/NURSE VISIT (OUTPATIENT)
Dept: FAMILY MEDICINE | Facility: CLINIC | Age: 80
End: 2025-08-28
Payer: MEDICARE

## 2025-08-28 DIAGNOSIS — E53.8 VITAMIN B12 DEFICIENCY (NON ANEMIC): Primary | ICD-10-CM

## 2025-08-28 RX ADMIN — CYANOCOBALAMIN 1000 MCG: 1000 INJECTION, SOLUTION INTRAMUSCULAR; SUBCUTANEOUS at 08:32

## (undated) RX ORDER — LIDOCAINE HYDROCHLORIDE 10 MG/ML
INJECTION, SOLUTION EPIDURAL; INFILTRATION; INTRACAUDAL; PERINEURAL
Status: DISPENSED
Start: 2023-11-10

## (undated) RX ORDER — ONDANSETRON 2 MG/ML
INJECTION INTRAMUSCULAR; INTRAVENOUS
Status: DISPENSED
Start: 2023-11-10

## (undated) RX ORDER — PROPOFOL 10 MG/ML
INJECTION, EMULSION INTRAVENOUS
Status: DISPENSED
Start: 2023-11-10